# Patient Record
Sex: FEMALE | Race: WHITE | NOT HISPANIC OR LATINO | Employment: FULL TIME | URBAN - NONMETROPOLITAN AREA
[De-identification: names, ages, dates, MRNs, and addresses within clinical notes are randomized per-mention and may not be internally consistent; named-entity substitution may affect disease eponyms.]

---

## 2017-02-01 ENCOUNTER — COMMUNICATION - GICH (OUTPATIENT)
Dept: FAMILY MEDICINE | Facility: OTHER | Age: 27
End: 2017-02-01

## 2017-03-03 ENCOUNTER — HISTORY (OUTPATIENT)
Dept: FAMILY MEDICINE | Facility: OTHER | Age: 27
End: 2017-03-03

## 2017-03-03 ENCOUNTER — OFFICE VISIT - GICH (OUTPATIENT)
Dept: FAMILY MEDICINE | Facility: OTHER | Age: 27
End: 2017-03-03

## 2017-03-03 DIAGNOSIS — E04.1 NONTOXIC SINGLE THYROID NODULE: ICD-10-CM

## 2017-03-03 DIAGNOSIS — K21.9 GASTRO-ESOPHAGEAL REFLUX DISEASE WITHOUT ESOPHAGITIS: ICD-10-CM

## 2017-03-03 DIAGNOSIS — E66.9 OBESITY: ICD-10-CM

## 2017-03-03 LAB
T3,FREE - HISTORICAL: 3.42 PG/ML (ref 2.5–3.9)
T4 FREE SERPL-MCNC: 0.87 NG/DL (ref 0.58–1.64)
TSH - HISTORICAL: 1.41 UIU/ML (ref 0.34–5.6)

## 2017-03-03 ASSESSMENT — PATIENT HEALTH QUESTIONNAIRE - PHQ9: SUM OF ALL RESPONSES TO PHQ QUESTIONS 1-9: 2

## 2017-03-07 ENCOUNTER — COMMUNICATION - GICH (OUTPATIENT)
Dept: FAMILY MEDICINE | Facility: OTHER | Age: 27
End: 2017-03-07

## 2017-03-07 ENCOUNTER — HOSPITAL ENCOUNTER (OUTPATIENT)
Dept: RADIOLOGY | Facility: OTHER | Age: 27
End: 2017-03-07
Attending: FAMILY MEDICINE

## 2017-03-07 DIAGNOSIS — E04.1 NONTOXIC SINGLE THYROID NODULE: ICD-10-CM

## 2017-03-08 ENCOUNTER — AMBULATORY - GICH (OUTPATIENT)
Dept: FAMILY MEDICINE | Facility: OTHER | Age: 27
End: 2017-03-08

## 2017-03-08 DIAGNOSIS — E04.1 NONTOXIC SINGLE THYROID NODULE: ICD-10-CM

## 2017-03-09 ENCOUNTER — COMMUNICATION - GICH (OUTPATIENT)
Dept: FAMILY MEDICINE | Facility: OTHER | Age: 27
End: 2017-03-09

## 2017-03-13 ENCOUNTER — HOSPITAL ENCOUNTER (OUTPATIENT)
Dept: RADIOLOGY | Facility: OTHER | Age: 27
End: 2017-03-13
Attending: FAMILY MEDICINE

## 2017-03-13 DIAGNOSIS — E04.1 NONTOXIC SINGLE THYROID NODULE: ICD-10-CM

## 2017-03-14 ENCOUNTER — HOSPITAL ENCOUNTER (OUTPATIENT)
Dept: RADIOLOGY | Facility: OTHER | Age: 27
End: 2017-03-14
Attending: FAMILY MEDICINE

## 2017-03-14 ENCOUNTER — COMMUNICATION - GICH (OUTPATIENT)
Dept: FAMILY MEDICINE | Facility: OTHER | Age: 27
End: 2017-03-14

## 2017-03-14 ENCOUNTER — AMBULATORY - GICH (OUTPATIENT)
Dept: FAMILY MEDICINE | Facility: OTHER | Age: 27
End: 2017-03-14

## 2017-03-14 DIAGNOSIS — E04.1 NONTOXIC SINGLE THYROID NODULE: ICD-10-CM

## 2017-03-21 ENCOUNTER — AMBULATORY - GICH (OUTPATIENT)
Dept: SCHEDULING | Facility: OTHER | Age: 27
End: 2017-03-21

## 2017-03-21 ENCOUNTER — HOSPITAL ENCOUNTER (OUTPATIENT)
Dept: RADIOLOGY | Facility: OTHER | Age: 27
End: 2017-03-21
Attending: FAMILY MEDICINE

## 2017-03-21 DIAGNOSIS — E04.1 NONTOXIC SINGLE THYROID NODULE: ICD-10-CM

## 2017-03-24 ENCOUNTER — COMMUNICATION - GICH (OUTPATIENT)
Dept: FAMILY MEDICINE | Facility: OTHER | Age: 27
End: 2017-03-24

## 2017-03-28 ENCOUNTER — OFFICE VISIT - GICH (OUTPATIENT)
Dept: FAMILY MEDICINE | Facility: OTHER | Age: 27
End: 2017-03-28

## 2017-03-28 ENCOUNTER — HISTORY (OUTPATIENT)
Dept: FAMILY MEDICINE | Facility: OTHER | Age: 27
End: 2017-03-28

## 2017-03-28 DIAGNOSIS — R69 ILLNESS: ICD-10-CM

## 2017-05-26 ENCOUNTER — HEALTH MAINTENANCE LETTER (OUTPATIENT)
Age: 27
End: 2017-05-26

## 2017-06-09 ENCOUNTER — HISTORY (OUTPATIENT)
Dept: FAMILY MEDICINE | Facility: OTHER | Age: 27
End: 2017-06-09

## 2017-06-09 ENCOUNTER — OFFICE VISIT - GICH (OUTPATIENT)
Dept: FAMILY MEDICINE | Facility: OTHER | Age: 27
End: 2017-06-09

## 2017-06-09 DIAGNOSIS — H10.9 CONJUNCTIVITIS: ICD-10-CM

## 2017-08-05 ENCOUNTER — OFFICE VISIT - GICH (OUTPATIENT)
Dept: FAMILY MEDICINE | Facility: OTHER | Age: 27
End: 2017-08-05

## 2017-08-05 ENCOUNTER — HISTORY (OUTPATIENT)
Dept: FAMILY MEDICINE | Facility: OTHER | Age: 27
End: 2017-08-05

## 2017-08-05 DIAGNOSIS — B96.89 OTHER SPECIFIED BACTERIAL AGENTS AS THE CAUSE OF DISEASES CLASSIFIED ELSEWHERE: ICD-10-CM

## 2017-08-05 DIAGNOSIS — N76.0 ACUTE VAGINITIS: ICD-10-CM

## 2017-08-05 DIAGNOSIS — N89.8 OTHER SPECIFIED NONINFLAMMATORY DISORDERS OF VAGINA (CODE): ICD-10-CM

## 2017-09-27 ENCOUNTER — OFFICE VISIT - GICH (OUTPATIENT)
Dept: FAMILY MEDICINE | Facility: OTHER | Age: 27
End: 2017-09-27

## 2017-09-27 ENCOUNTER — HISTORY (OUTPATIENT)
Dept: FAMILY MEDICINE | Facility: OTHER | Age: 27
End: 2017-09-27

## 2017-09-27 DIAGNOSIS — S30.0XXA CONTUSION OF LOWER BACK AND PELVIS, INITIAL ENCOUNTER: ICD-10-CM

## 2017-09-27 DIAGNOSIS — S16.1XXA STRAIN OF MUSCLE, FASCIA AND TENDON AT NECK LEVEL, INITIAL ENCOUNTER: ICD-10-CM

## 2017-09-27 DIAGNOSIS — S39.012A STRAIN OF MUSCLE, FASCIA AND TENDON OF LOWER BACK, INITIAL ENCOUNTER: ICD-10-CM

## 2017-09-27 DIAGNOSIS — M79.10 MYALGIA: ICD-10-CM
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
## 2017-10-31 ENCOUNTER — COMMUNICATION - GICH (OUTPATIENT)
Dept: FAMILY MEDICINE | Facility: OTHER | Age: 27
End: 2017-10-31

## 2017-10-31 DIAGNOSIS — F33.1 MAJOR DEPRESSIVE DISORDER, RECURRENT, MODERATE (H): ICD-10-CM

## 2017-12-27 NOTE — PROGRESS NOTES
Patient Information     Patient Name MRN Sex Ita Gross [id number] Female 1990      Progress Notes by Vianney Acosta NP at 2017  2:15 PM     Author:  Vianney Acosta NP Service:  (none) Author Type:  PHYS- Nurse Practitioner     Filed:  2017  3:45 PM Encounter Date:  2017 Status:  Signed     :  Vianney Acosta NP (PHYS- Nurse Practitioner)            HPI:  Nursing Notes:   AlvarezChitra  2017  1:55 PM  Signed  Patient presents to clinic with redness in right eye.  Chitra VICTOR AntonioLPN ....................  2017   1:46 PM      Ita Poole is a 27 y.o. female who presents to clinic for right eye redness, itching, burning, green goo on lashes and keeps gluing shut-started yesterday. Blinking seems to worsen itching and burning. Denies coughing, sneezing, runny nose. Avoiding touching eye to treat symptoms.    Past Medical History:     Diagnosis  Date     Allergic rhinitis, cause unspecified      Celiac disease      Unspecified asthma      Past Surgical History:      Procedure  Laterality Date      SECTION  ,4/10/14     Section       COLONOSCOPY      age 10       ESOPHAGOGASTRODUODENOSCOPY      age 10, small bowel bx: celiac disease       LAP CHOLECYSTECTOMY  9/22/15    Cholecystectomy,Laparoscopic       Social History       Substance Use Topics         Smoking status:   Never Smoker     Smokeless tobacco:   Never Used      Comment: passive exposure      Alcohol use   No     Current Outpatient Prescriptions       Medication  Sig Dispense Refill     albuterol HFA (VENTOLIN HFA) 90 mcg/actuation inhaler Inhale 2 Puffs by mouth every 4 hours if needed for Wheezing. 1 Inhaler 0     CHOLECALCIFEROL, VITAMIN D3, (VITAMIN D3 ORAL) Take 1 Tab by mouth once daily.       omeprazole (PRILOSEC) 20 mg Delayed-Release capsule Take 1 capsule by mouth once daily before a meal.  11     Omeprazole 20 mg tablet Take 1 tablet by mouth once  "daily. 30 tablet 11     ranitidine (ZANTAC) 150 mg tablet Take 1 tablet by mouth 2 times daily. For breakthrough reflux. 60 tablet 11     risperiDONE (RISPERDAL) 0.5 mg tablet Take 1 tablet by mouth 2 times daily if needed.  0     sertraline (ZOLOFT) 100 mg tablet Take 100 mg by mouth once daily.  1     sertraline (ZOLOFT) 50 mg tablet Take 2 tablets at bedtime 90 tablet 3     traZODone (DESYREL) 50 mg tablet Take 1-2 tablets as needed at bedtime  0     No current facility-administered medications for this visit.      Medications have been reviewed by me and are current to the best of my knowledge and ability.    Allergies      Allergen   Reactions     Augmentin [Amoxicillin-Pot Clavulanate]  Hives     Gluten  Constipation     Celiac disease, abdominal pain      Zithromax [Azithromycin]       Can't take because it contains wheat        ROS:  Refer to HPI    /74  Pulse 60  Temp 98.4  F (36.9  C) (Tympanic)   Resp 18  Ht 1.588 m (5' 2.5\")  Wt 99.2 kg (218 lb 12.8 oz)  LMP 05/28/2017  BMI 39.38 kg/m2    EXAM:  General Appearance: Well appearing female, appropriate appearance for age. No acute distress  Eyes: right conjunctiva injected, no crusting on lashes or discharge, PERRLA  Psychological: normal affect, alert and pleasant    ASSESSMENT/PLAN:    ICD-10-CM    1. Bacterial conjunctivitis of right eye H10.9 trimethoprim-polymyxin b (POLYTRIM) ophthalmic solution   Right eye redness with itching, burning and crusting on lashes  On exam: well appearing female without fever, right conjunctiva injected, no crusting on lashes or discharge, PERRLA  Diagnosis: Allergic Conjunctivitis vs Bacterial Conjunctivitis  Please treat symptoms with OTC allergy eye drops, if symptoms worsen or don't improve with treatment may start Polytrim eye drops 1 drop QID 7 days  Follow up if symptoms persist or worsen    Patient Instructions      Index [place] Related topics   Viral or Bacterial Conjunctivitis (Pinkeye) "   ________________________________________________________________________  KEY POINTS    Viral or bacterial conjunctivitis is infection, redness, and swelling of the clear membrane that lines the inside of your eyelids and covers the white of your eye.    Viral conjunctivitis will usually go away on its own without treatment. However, your healthcare provider may prescribe eye drops to help control your symptoms.    For bacterial conjunctivitis, your healthcare provider will prescribe antibiotic eye drops.    To keep from getting conjunctivitis from someone who has it, or to keep from spreading it to others, wash your hands often, and avoid close contact with people until your symptoms improve.  ________________________________________________________________________  What is conjunctivitis?   Conjunctivitis is redness and swelling of the clear membrane that lines the inside of your eyelids and covers the white of your eye. This membrane is called the conjunctiva. This redness and swelling is also called pinkeye.   What is the cause?   This type of conjunctivitis is an infection caused by viruses or bacteria. These germs can be spread easily by coughing or sneezing, or by touching something with your hands and then touching your eyes. It can also be caused by improper cleaning of soft contact lenses.  What are the symptoms?   Symptoms may include:    Red, itchy, swollen or scratchy eyes    Sensitivity to light    Pus or watery discharge, which can cause crusty eyelashes  How is it diagnosed?   Your healthcare provider will ask about your symptoms, medical history, and activities, and examine your eyes. He or she will also check for enlarged lymph nodes near your ear and jaw, which may be a sign of an infection in your body. A sample of the pus from your eye may be sent to a lab to check for the cause.  How is it treated?   Viral conjunctivitis will usually go away on its own without treatment. However, your  healthcare provider may prescribe eye drops to help control your symptoms. Anti-allergy pills or eye drops may also relieve the itching and redness. Viral conjunctivitis usually gets worse at first, then gets better in 3 to 10 days. If only one eye is affected at first, it may spread to the other eye later. Usually, if both eyes are affected, the first eye has worse conjunctivitis than the second.  For bacterial conjunctivitis, your healthcare provider will prescribe antibiotic eye drops. With antibiotics, the symptoms should improve in a couple of days. It is important to avoid close contact with people until you have used the antibiotics for 24 hours and your eye does not have a lot of pus.   If you wear contact lenses, your provider may tell you to stop wearing them until your infection is gone. Wearing contact lenses while you have conjunctivitis may make the infection last longer. Wearing contacts while you have conjunctivitis may also damage your cornea, which is the clear outer layer on the front of your eye, and cause severe vision problems. Your provider may ask you to throw away your current contact lenses and lens case.  How can I take care of myself?   Follow the full course of treatment your healthcare provider prescribes. Ask your healthcare provider:    How and when you will get your test results    How long it will take to recover    If there are activities you should avoid and when you can return to your normal activities    How to take care of yourself at home    What symptoms or problems you should watch for and what to do if you have them  Make sure you know when you should come back for a checkup. Keep all appointments for provider visits or tests.  How can I help prevent conjunctivitis?   To keep from getting conjunctivitis from someone who has it, or to keep from spreading it to others, follow these guidelines:    Wash your hands often. Do not touch or rub your eyes.    Do not share eye makeup  or cosmetics with anyone. When you have conjunctivitis, throw out all eye makeup you have been using.    Never use eye medicine that has been prescribed for someone else.    Do not share towels, washcloths, pillows, or sheets with anyone. If one of your eyes is affected but not the other, use a separate towel for each eye.    Avoid swimming while you have conjunctivitis.    Avoid close contact with people until your symptoms improve. Depending on your job, you may be asked to take time off from work until the conjunctivitis is healed.  Reviewed for medical accuracy by faculty at the [facility]. Web site: [email or web address]  Developed by [employer].  Adult Advisor 2016.3 published by [employer].  Last modified: 2016-03-23  Last reviewed: 2015-09-21  This content is reviewed periodically and is subject to change as new health information becomes available. The information is intended to inform and educate and is not a replacement for medical evaluation, advice, diagnosis or treatment by a healthcare professional.  References   Adult Advisor 2016.3 Index    Copyright   2016 [employer], a division of [employer] All rights reserved.

## 2017-12-28 NOTE — PATIENT INSTRUCTIONS
Patient Information     Patient Name MRN Sex Ita Gross [id number] Female 1990      Patient Instructions by Negra Arvizu NP at 2017  1:00 PM     Author:  Negra Arvizu NP  Service:  (none) Author Type:  PHYS- Nurse Practitioner     Filed:  2017  1:49 PM  Encounter Date:  2017 Status:  Addendum     :  Negra Arvizu NP (PHYS- Nurse Practitioner)        Related Notes: Original Note by Negra Arvizu NP (PHYS- Nurse Practitioner) filed at 2017  1:47 PM            Flexeril every 8 hours as needed for muscle tightness, spasm    Ibuprofen every 8 hours as needed for pain and swelling - do not take today as your received a Toradol injection in clinic    Alternate ice and heat    Baths    Follow up if any worsening or concerns

## 2017-12-28 NOTE — PROGRESS NOTES
Patient Information     Patient Name MRN Sex Ita Gross [id number] Female 1990      Progress Notes by Negra Arvizu NP at 2017  1:00 PM     Author:  Negra Arvizu NP Service:  (none) Author Type:  PHYS- Nurse Practitioner     Filed:  2017  2:45 PM Encounter Date:  2017 Status:  Signed     :  Negra rAvizu NP (PHYS- Nurse Practitioner)            HPI:    Ita Poole is a 27 y.o. female who presents to clinic today for pain.   States she slipped down 3 steps outside yesterday on the deck stairs landing on her buttocks because her feet when out from under her.  States she has bruising and pain in the right buttocks.  She denies any tailbone pain or injury.  Generalized muscle tightness and soreness in hips, back, shoulders, and neck.  No headaches.  Dizziness/light headed earlier today, resolved now.   No numbness, tingling, or weakness in bilateral legs.  Sore to push for a bowel movement.  No dysuria.  Denies blood in urine or stool.  No abdominal pain.  Using topical pain cream.  Iced.  Ibuprofen 800 mg x 1 last night.          Past Medical History:     Diagnosis  Date     Allergic rhinitis, cause unspecified      Celiac disease      Unspecified asthma      Past Surgical History:      Procedure  Laterality Date      SECTION  ,4/10/14     Section       COLONOSCOPY      age 10       ESOPHAGOGASTRODUODENOSCOPY      age 10, small bowel bx: celiac disease       LAP CHOLECYSTECTOMY  9/22/15    Cholecystectomy,Laparoscopic       Social History       Substance Use Topics         Smoking status:   Never Smoker     Smokeless tobacco:   Never Used      Comment: passive exposure      Alcohol use   No     Current Outpatient Prescriptions       Medication  Sig Dispense Refill     albuterol HFA (VENTOLIN HFA) 90 mcg/actuation inhaler Inhale 2 Puffs by mouth every 4 hours if needed for Wheezing. 1 Inhaler 0     CHOLECALCIFEROL, VITAMIN D3, (VITAMIN D3 ORAL) Take 1  "Tab by mouth once daily.       omeprazole (PRILOSEC) 20 mg Delayed-Release capsule Take 1 capsule by mouth once daily before a meal.  11     Omeprazole 20 mg tablet Take 1 tablet by mouth once daily. 30 tablet 11     ranitidine (ZANTAC) 150 mg tablet Take 1 tablet by mouth 2 times daily. For breakthrough reflux. 60 tablet 11     risperiDONE (RISPERDAL) 0.5 mg tablet Take 1 tablet by mouth 2 times daily if needed.  0     sertraline (ZOLOFT) 100 mg tablet Take 100 mg by mouth once daily.  1     sertraline (ZOLOFT) 50 mg tablet Take 2 tablets at bedtime 90 tablet 3     No current facility-administered medications for this visit.      Medications have been reviewed by me and are current to the best of my knowledge and ability.    Allergies      Allergen   Reactions     Augmentin [Amoxicillin-Pot Clavulanate]  Hives     Gluten  Constipation     Celiac disease, abdominal pain      Zithromax [Azithromycin]       Can't take because it contains wheat        Past medical history, past surgical history, current medications and allergies reviewed and accurate to the best of my knowledge.        ROS:  Refer to HPI    /58  Pulse 64  Temp 97.9  F (36.6  C) (Tympanic)   Resp 20  Ht 1.6 m (5' 3\")  Wt 103.1 kg (227 lb 6.4 oz)  LMP 09/20/2017  Breastfeeding? No  BMI 40.28 kg/m2    EXAM:  General Appearance: Well appearing adult female, appropriate appearance for age. No acute distress  Head: normocephalic, atraumatic  Eyes: conjunctivae normal without erythema or irritation, no drainage or crusting, no eyelid swelling, pupils equal   Respiratory: normal chest wall and respirations.  Normal effort.  Clear to auscultation bilaterally, no wheezing, crackles or rhonchi.  No increased work of breathing.  No cough appreciated, oxygen saturation   Cardiac: RRR with no murmurs  :  No CVA tenderness to palpation.    Musculoskeletal:  No spinal tenderness to palpation over the cervical, thoracic, or lumbar spine.  Localized " tenderness to palpation over bruised area on right buttocks tissue.  Brianne ROM of neck without difficulty, guarding or stiffness.  Normal gait.  Equal movement of bilateral upper extremities.  Equal movement of bilateral lower extremities.    Dermatological:  Right buttocks with 13 cm x 7 cm deep bruise/contusion   Psychological: normal affect, alert and pleasant        ASSESSMENT/PLAN:    ICD-10-CM    1. Fall on steps, initial encounter W10.8XXA ibuprofen (ADVIL; MOTRIN) 800 mg tablet      ketorolac 60 mg injection (TORADOL)   2. Right buttock pain M79.1 ibuprofen (ADVIL; MOTRIN) 800 mg tablet      ketorolac 60 mg injection (TORADOL)   3. Contusion, buttock, initial encounter S30.0XXA ibuprofen (ADVIL; MOTRIN) 800 mg tablet      ketorolac 60 mg injection (TORADOL)   4. Low back strain, initial encounter S39.012A cyclobenzaprine (FLEXERIL) 10 mg tablet      ibuprofen (ADVIL; MOTRIN) 800 mg tablet      ketorolac 60 mg injection (TORADOL)   5. Strain of neck muscle, initial encounter S16.1XXA cyclobenzaprine (FLEXERIL) 10 mg tablet      ibuprofen (ADVIL; MOTRIN) 800 mg tablet      ketorolac 60 mg injection (TORADOL)         Contusion of buttocks tissue.  No spinal tenderness or pain - no xray indicated.  Generalized muscle strain and soreness from fall  Toradol 60 mg IM injection x 1 administered in clinic per patient request  Flexeril 10 mg TID PRN  Ibuprofen 800 mg TID PRN - instructed to wait 24 hours to take due to receiving Toradol injection  Tylenol TID PRN  Alternate ice and heat.  Try warm water baths.  Follow up with PCP if symptoms persist or worsen or concerns          Patient Instructions   Flexeril every 8 hours as needed for muscle tightness, spasm    Ibuprofen every 8 hours as needed for pain and swelling - do not take today as your received a Toradol injection in clinic    Alternate ice and heat    Baths    Follow up if any worsening or concerns

## 2017-12-28 NOTE — PROGRESS NOTES
Patient Information     Patient Name MRN Sex Ita Mcintyre [id number] Female 1990      Progress Notes by Lizzy Mckenzie NP at 2017 10:30 AM     Author:  Lizzy Mckenzie NP Service:  (none) Author Type:  PHYS- Nurse Practitioner     Filed:  2017 11:14 AM Encounter Date:  2017 Status:  Signed     :  Lizzy Mckenzie NP (PHYS- Nurse Practitioner)            Nursing Notes:   Salud Vera  2017 10:54 AM  Signed  Patient presents to the clinic with possible yeast infection. Started on . S/sx include itchy, burning, swollen, discharge.   Salud Vera ....................  2017   10:34 AM  SUBJECTIVE:    Ita Poole is a 27 y.o. female who presents for vaginal d/c, itching    Vaginal Itching   The patient's primary symptoms include genital itching and vaginal discharge. The patient's pertinent negatives include no genital lesions, genital odor, genital rash, missed menses, pelvic pain or vaginal bleeding. This is a new problem. The current episode started in the past 7 days. The problem occurs constantly. The problem has been unchanged. Pertinent negatives include no abdominal pain, chills, discolored urine, dysuria, fever, flank pain, frequency, nausea, rash or urgency. The vaginal discharge was white and copious. There has been no bleeding. She has not been passing clots. She has not been passing tissue. The symptoms are aggravated by tactile pressure. Treatments tried: Anti itch creams. No, her partner does not have an STD. Her past medical history is significant for vaginosis.       Current Outpatient Prescriptions on File Prior to Visit       Medication  Sig Dispense Refill     albuterol HFA (VENTOLIN HFA) 90 mcg/actuation inhaler Inhale 2 Puffs by mouth every 4 hours if needed for Wheezing. 1 Inhaler 0     CHOLECALCIFEROL, VITAMIN D3, (VITAMIN D3 ORAL) Take 1 Tab by mouth once daily.       omeprazole (PRILOSEC) 20 mg Delayed-Release capsule Take 1 capsule by  "mouth once daily before a meal.  11     Omeprazole 20 mg tablet Take 1 tablet by mouth once daily. 30 tablet 11     ranitidine (ZANTAC) 150 mg tablet Take 1 tablet by mouth 2 times daily. For breakthrough reflux. 60 tablet 11     risperiDONE (RISPERDAL) 0.5 mg tablet Take 1 tablet by mouth 2 times daily if needed.  0     sertraline (ZOLOFT) 100 mg tablet Take 100 mg by mouth once daily.  1     sertraline (ZOLOFT) 50 mg tablet Take 2 tablets at bedtime 90 tablet 3     traZODone (DESYREL) 50 mg tablet Take 1-2 tablets as needed at bedtime  0     No current facility-administered medications on file prior to visit.     and   Patient Active Problem List     Diagnosis  Code     Unspecified asthma(493.90) J45.909     Allergic rhinitis, cause unspecified J30.9     Celiac disease K90.0     Elevated fasting glucose R73.01     Vitamin D deficiency E55.9     Major depressive disorder, recurrent episode, moderate (HC) F33.1     H/O  section Z98.891     Irregular menses N92.6     Obesity E66.9     Cold thyroid nodule, right lobe E04.1       REVIEW OF SYSTEMS:  Review of Systems   Constitutional: Negative for chills and fever.   Gastrointestinal: Negative for abdominal pain and nausea.   Genitourinary: Positive for vaginal discharge. Negative for dysuria, flank pain, frequency, missed menses, pelvic pain and urgency.   Skin: Negative for rash.       OBJECTIVE:  /70  Pulse 68  Temp 97.7  F (36.5  C) (Tympanic)  Resp 18  Ht 1.6 m (5' 3\")  Wt 100.9 kg (222 lb 6.4 oz)  LMP 2017  Breastfeeding? No  BMI 39.4 kg/m2    EXAM:   Physical Exam   Constitutional: She is well-developed, well-nourished, and in no distress.   HENT:   Head: Normocephalic and atraumatic.   Eyes: Conjunctivae are normal.   Cardiovascular: Normal rate.    Pulmonary/Chest: Effort normal. No respiratory distress.   Genitourinary:   Genitourinary Comments: Did not want an exam since she had her kids present, collected the wet prep her self.  "   Skin: Skin is warm and dry.   Psychiatric: Mood and affect normal.   Nursing note and vitals reviewed.    Results for orders placed or performed in visit on 08/05/17      WET PREP GENITAL      Result  Value Ref Range    TRICHOMONAS               None Seen None Seen    YEAST                     None Seen None Seen    CLUE CELLS                Present (A) None Seen       ASSESSMENT/PLAN:    ICD-10-CM    1. Vaginal discharge N89.8 WET PREP GENITAL      WET PREP GENITAL   2. Bacterial vaginosis N76.0 metroNIDAZOLE (FLAGYL) 500 mg tablet     B96.89         Plan:  Completed labs at today's visit Wet prep.  I personally reviewed the labs with the patient/parent at the visit. Abnormalities include + clue cells. Treated with flagyl for BV. Home cares discussed. F/U if needed.       CHENG MOON NP ....................  8/5/2017   11:13 AM

## 2017-12-28 NOTE — PATIENT INSTRUCTIONS
Patient Information     Patient Name MRN Ita Read [id number] Female 1990      Patient Instructions by Lizzy Mckenzie NP at 2017 10:30 AM     Author:  Lizzy Mckenzie NP Service:  (none) Author Type:  PHYS- Nurse Practitioner     Filed:  2017 11:12 AM Encounter Date:  2017 Status:  Signed     :  Lizzy Mckenzie NP (PHYS- Nurse Practitioner)            Vaginal Bacteria Overgrowth (Bacterial Vaginosis)   ________________________________________________________________________  KEY POINTS    Bacterial vaginosis (BV) is an overgrowth of a certain type of bacteria in the vagina (birth canal). It is a common condition that may or may not cause symptoms.    Untreated BV may go away on its own. If BV is causing itching, pain, or other problems, you may need antibiotic medicine.    Ask your healthcare provider if there are activities you should avoid and when you can return to your normal activities.  ________________________________________________________________________  What is bacterial vaginosis?  Bacterial vaginosis (BV) is an overgrowth of a certain type of bacteria in the vagina (birth canal). It is a common condition that may or may not cause symptoms.   What is the cause?  It s normal to have some bacteria in the vagina, but sometimes there are too many of certain types of bacteria. Doctors don t know what causes this imbalance of bacteria. One possible cause is douching (cleaning out the vagina with water or other fluids).  Most cases of bacterial vaginosis happen in sexually active women. Women who have more than 1 sexual partner have a greater risk of this problem. However, women who are not sexually active can also have vaginosis. Smoking cigarettes also increases the risk.  What are the symptoms?  Many women don t have any symptoms. When women do have symptoms, the most common symptom is a discharge from the vagina. The discharge may be gray or yellowish and  smell bad. For example, it may smell fishy, especially after sex. You may also have itching around the opening of the vagina. Sometimes BV can cause pain or burning in the vagina that does not go away.  How is it diagnosed?  Your healthcare provider will ask about your symptoms and medical history. You will have a pelvic exam, and your provider will get a sample of vaginal discharge for lab tests.  How is it treated?   Untreated bacterial vaginosis sometimes goes away on its own. Sometimes, if you scratch the area to relieve itching, you may get an infection. Rarely, it may cause vaginal pain that keeps bothering you. If bacterial vaginosis is causing itching, pain, or other problems, it may need to be treated with antibiotics. The medicine for bacterial vaginosis may be taken by mouth or it may be a cream or gel that you put into the vagina. The symptoms usually go away within a few days after you start treatment.   How can I take care of myself?  Follow your healthcare provider's instructions. Ask your provider:    How and when you will get your test results    How long it will take to recover    If there are activities you should avoid and when you can return to your normal activities    How to take care of yourself at home    What symptoms or problems you should watch for and what to do if you have them  Make sure you know when you should come back for a checkup. Keep all appointments for provider visits or tests.  How can I help prevent bacterial vaginosis?    Use latex or polyurethane condoms during foreplay and every time you have vaginal, oral, or anal sex.    Have just 1 sexual partner who is not having sex with anyone else.    If you have had sex and are worried that you may have been infected, see your healthcare provider even if you don t have any symptoms.    Do not douche.

## 2017-12-28 NOTE — TELEPHONE ENCOUNTER
Patient Information     Patient Name MRN Sex Ita Gross [id number] Female 1990      Telephone Encounter by Augusto Barreto RN at 10/31/2017  3:20 PM     Author:  Augusto Barreto RN Service:  (none) Author Type:  NURS- Registered Nurse     Filed:  10/31/2017  3:29 PM Encounter Date:  10/31/2017 Status:  Signed     :  Augusto Barreto RN (NURS- Registered Nurse)            Writer received a faxed rx request from [facility] in [facility]. Per faxed rx request:    Patient will be out today, and MALAI Case denied rx refill.    Chart review shows that rx as requested is noted to be historical in patient's chart. Writer is unable to fill rx as requested. Will route rx request to PCP for his consideration/approval.     This is a Refill request from: [facility] in [facility]  Name of Medication: Zoloft  Quantity requested: 90 tabs  Last fill date: 17 for a 30 day supply as per rx request  Due for refill: Yes, as per rx request  Last visit with VAHE REYNOSO was on: 2017 in [facility]  PCP:  Vahe Reynoso MD  Controlled Substance Agreement: N/A   Diagnosis r/t this medication request: Depression     Unable to complete prescription refill per RN Medication Refill Policy.................... Augusto Barreto RN ....................  10/31/2017   3:25 PM

## 2017-12-29 NOTE — PATIENT INSTRUCTIONS
Patient Information     Patient Name MRN Ita Read [id number] Female 1990      Patient Instructions by Vianney Acosta NP at 2017  2:15 PM     Author:  Vianney Acosta NP Service:  (none) Author Type:  PHYS- Nurse Practitioner     Filed:  2017  2:01 PM Encounter Date:  2017 Status:  Signed     :  Vianney Acosta NP (PHYS- Nurse Practitioner)               Index Frisian Related topics   Viral or Bacterial Conjunctivitis (Pinkeye)   ________________________________________________________________________  KEY POINTS    Viral or bacterial conjunctivitis is infection, redness, and swelling of the clear membrane that lines the inside of your eyelids and covers the white of your eye.    Viral conjunctivitis will usually go away on its own without treatment. However, your healthcare provider may prescribe eye drops to help control your symptoms.    For bacterial conjunctivitis, your healthcare provider will prescribe antibiotic eye drops.    To keep from getting conjunctivitis from someone who has it, or to keep from spreading it to others, wash your hands often, and avoid close contact with people until your symptoms improve.  ________________________________________________________________________  What is conjunctivitis?   Conjunctivitis is redness and swelling of the clear membrane that lines the inside of your eyelids and covers the white of your eye. This membrane is called the conjunctiva. This redness and swelling is also called pinkeye.   What is the cause?   This type of conjunctivitis is an infection caused by viruses or bacteria. These germs can be spread easily by coughing or sneezing, or by touching something with your hands and then touching your eyes. It can also be caused by improper cleaning of soft contact lenses.  What are the symptoms?   Symptoms may include:    Red, itchy, swollen or scratchy eyes    Sensitivity to light    Pus or  watery discharge, which can cause crusty eyelashes  How is it diagnosed?   Your healthcare provider will ask about your symptoms, medical history, and activities, and examine your eyes. He or she will also check for enlarged lymph nodes near your ear and jaw, which may be a sign of an infection in your body. A sample of the pus from your eye may be sent to a lab to check for the cause.  How is it treated?   Viral conjunctivitis will usually go away on its own without treatment. However, your healthcare provider may prescribe eye drops to help control your symptoms. Anti-allergy pills or eye drops may also relieve the itching and redness. Viral conjunctivitis usually gets worse at first, then gets better in 3 to 10 days. If only one eye is affected at first, it may spread to the other eye later. Usually, if both eyes are affected, the first eye has worse conjunctivitis than the second.  For bacterial conjunctivitis, your healthcare provider will prescribe antibiotic eye drops. With antibiotics, the symptoms should improve in a couple of days. It is important to avoid close contact with people until you have used the antibiotics for 24 hours and your eye does not have a lot of pus.   If you wear contact lenses, your provider may tell you to stop wearing them until your infection is gone. Wearing contact lenses while you have conjunctivitis may make the infection last longer. Wearing contacts while you have conjunctivitis may also damage your cornea, which is the clear outer layer on the front of your eye, and cause severe vision problems. Your provider may ask you to throw away your current contact lenses and lens case.  How can I take care of myself?   Follow the full course of treatment your healthcare provider prescribes. Ask your healthcare provider:    How and when you will get your test results    How long it will take to recover    If there are activities you should avoid and when you can return to your normal  activities    How to take care of yourself at home    What symptoms or problems you should watch for and what to do if you have them  Make sure you know when you should come back for a checkup. Keep all appointments for provider visits or tests.  How can I help prevent conjunctivitis?   To keep from getting conjunctivitis from someone who has it, or to keep from spreading it to others, follow these guidelines:    Wash your hands often. Do not touch or rub your eyes.    Do not share eye makeup or cosmetics with anyone. When you have conjunctivitis, throw out all eye makeup you have been using.    Never use eye medicine that has been prescribed for someone else.    Do not share towels, washcloths, pillows, or sheets with anyone. If one of your eyes is affected but not the other, use a separate towel for each eye.    Avoid swimming while you have conjunctivitis.    Avoid close contact with people until your symptoms improve. Depending on your job, you may be asked to take time off from work until the conjunctivitis is healed.  Reviewed for medical accuracy by faculty at the [facility]. Web site: http://[email or web address]/  Developed by [employer].  Adult Advisor 2016.3 published by [employer].  Last modified: 2016-03-23  Last reviewed: 2015-09-21  This content is reviewed periodically and is subject to change as new health information becomes available. The information is intended to inform and educate and is not a replacement for medical evaluation, advice, diagnosis or treatment by a healthcare professional.  References   Adult Advisor 2016.3 Index    Copyright   2016 [employer], a division of [employer] All rights reserved.

## 2017-12-30 NOTE — NURSING NOTE
Patient Information     Patient Name MRN Ita Read [id number] Female 1990      Nursing Note by Salud Vera at 2017 10:30 AM     Author:  Salud Vera Service:  (none) Author Type:  NURS- Student Practical Nurse     Filed:  2017 10:54 AM Encounter Date:  2017 Status:  Signed     :  Salud Vera (NURS- Student Practical Nurse)            Patient presents to the clinic with possible yeast infection. Started on . S/sx include itchy, burning, swollen, discharge.   Salud Vera ....................  2017   10:34 AM

## 2017-12-30 NOTE — NURSING NOTE
Patient Information     Patient Name MRN Sex Ita Gross [id number] Female 1990      Nursing Note by Chitra Alvarez at 2017  2:15 PM     Author:  Chitra Alvarez Service:  (none) Author Type:  (none)     Filed:  2017  1:55 PM Encounter Date:  2017 Status:  Signed     :  Chitra Alvarez            Patient presents to clinic with redness in right eye.  Chitra England ....................  2017   1:46 PM

## 2018-01-03 NOTE — TELEPHONE ENCOUNTER
Patient Information     Patient Name MRN Ita Read [id number] Female 1990      Telephone Encounter by Will Reynoso MD at 2017  4:48 PM     Author:  Will Reynoso MD Service:  (none) Author Type:  Physician     Filed:  2017  4:49 PM Encounter Date:  2017 Status:  Signed     :  Will Reynoso MD (Physician)            Yes, she can just stop it. It is not common for birth control to cause depression.  Stopping it will make her periods heavier and less predictable.  I would advise against stopping it until her boyfriend has the post vas specimen checked.  Will Reynoso MD ....................  2017   4:49 PM

## 2018-01-03 NOTE — PROGRESS NOTES
Patient Information     Patient Name MRN Sex Ita Gross [id number] Female 1990      Progress Notes by [facility] at 3/7/2017 10:31 AM     Author:  Radha [facility]T. [facility] Service:  (none) Author Type:  RadTech - Registered Radiologic Technologist     Filed:  3/7/2017 10:31 AM Date of Service:  3/7/2017 10:31 AM Status:  Signed     :  Radha Paredes R.T. (ARRT) ([facility] - Registered Radiologic Technologist)            Falls Risk Criteria:    Age 65 and older or under age 4        Sensory deficits    Poor vision    Use of ambulatory aides    Impaired judgment    Unable to walk independently    Meets High Risk criteria for falls:  no

## 2018-01-03 NOTE — TELEPHONE ENCOUNTER
Patient Information     Patient Name MRN Sex Ita Gross [id number] Female 1990      Telephone Encounter by Lisa Jenkins at 3/14/2017  1:14 PM     Author:  Lisa Jenkins Service:  (none) Author Type:  (none)     Filed:  3/14/2017  1:27 PM Encounter Date:  3/14/2017 Status:  Signed     :  Lisa Jenkins            Patient notified   Lisa Jenkins LPN ..........3/14/2017 1:27 PM

## 2018-01-03 NOTE — MISCELLANEOUS
Patient Information     Patient Name MRN Sex Ita Gross [id number] Female 1990      Protocol by Viki Galicia RN at 3/21/2017 11:18 AM     Author:  Viki Galicia RN Service:  (none) Author Type:  NURS- Registered Nurse     Filed:  3/21/2017 11:19 AM Date of Service:  3/21/2017 11:18 AM Status:  Signed     :  Viki Galicia RN (NURS- Registered Nurse)            Universal Protocol    A. Pre-procedure verification complete yes  1-relevant information / documentation available, reviewed and properly matched to the patient; 2-consent accurate and complete, 3-equipment and supplies available    B. Site marking complete Yes  Site marked if not in continuous attendance with patient    C. TIME OUT completed yes  Time Out was conducted just prior to starting procedure to verify the eight required elements: 1-patient identity, 2-consent accurate and complete, 3-position, 4-correct side/site marked (if applicable), 5-procedure, 6-relevant images / results properly labeled and displayed (if applicable), 7-antibiotics / irrigation fluids (if applicable), 8-safety precautions.

## 2018-01-03 NOTE — PROGRESS NOTES
Patient Information     Patient Name MRN Sex Ita Gross [id number] Female 1990      Progress Notes by Dena Cole CNMT at 3/13/2017  9:35 AM     Author:  Dena Cole CNMT Service:  (none) Author Type:  RadTech - Registered Radiologic Technologist     Filed:  3/13/2017  9:35 AM Date of Service:  3/13/2017  9:35 AM Status:  Signed     :  Dena Cole CNMT ([employer] - Registered Radiologic Technologist)            Falls Risk Criteria:    Age 65 and older or under age 4        Sensory deficits    Poor vision    Use of ambulatory aides    Impaired judgment    Unable to walk independently    Meets High Risk criteria for falls:  no

## 2018-01-03 NOTE — TELEPHONE ENCOUNTER
Patient Information     Patient Name MRN Ita Reda [id number] Female 1990      Telephone Encounter by Sarahi Larry at 3/9/2017  8:36 AM     Author:  Sarahi Larry Service:  (none) Author Type:  (none)     Filed:  3/9/2017  8:40 AM Encounter Date:  3/9/2017 Status:  Signed     :  Sarahi Larry            Patient notified that results were released to her my chart. Read result notes. Patient states she is aware and nuclear med scan is already set up.  Sarahi Larry LPN   3/9/2017  8:40 AM

## 2018-01-03 NOTE — PROGRESS NOTES
Patient Information     Patient Name MRN Sex Ita Gross [id number] Female 1990      Progress Notes by Dena Cole CNMT at 3/13/2017  9:35 AM     Author:  Dena Cole CNMT Service:  (none) Author Type:  RadTech - Registered Radiologic Technologist     Filed:  3/13/2017  9:36 AM Date of Service:  3/13/2017  9:35 AM Status:  Signed     :  Dena Cole CNMT ([employer] - Registered Radiologic Technologist)                       1.  Do you have dizziness or vertigo?    yes                    2.  Do you need help standing or walking?   no                 3.  Have you fallen within the last 6 months?    yes           4.  Has the patient been fasting?      no       If any risks are marked Yes, the following interventions are utilized:    Do not leave patient unattended     Assist patient in the dressing room and bathroom    Have ambulatory aides available throughout procedure    Involve patient s family if available

## 2018-01-03 NOTE — PROGRESS NOTES
"Patient Information     Patient Name MRN Sex Ita Gross [id number] Female 1990      Progress Notes by Will Reynoso MD at 3/3/2017 10:30 AM     Author:  Will Reynoso MD Service:  (none) Author Type:  Physician     Filed:  3/3/2017  4:34 PM Encounter Date:  3/3/2017 Status:  Signed     :  Will Reynoso MD (Physician)            There are no exam notes on file for this visit.    SUBJECTIVE:  Ita Poole  is a 26 y.o. female comes in today for \"stomach issues\". She's had trouble with reflux in the past. She had been on omeprazole 40 mg daily with ranitidine for breakthrough reflux. She ran out of medications in the fall. She did ok on Zantac, but then ran out and symptoms came back.     She noticed a month ago a lump in her right neck.  It is a bit sore.  No systemic symptoms.    Past Medical, Family, and Social History reviewed and updated as noted below.   ROS is negative except as noted above       Allergies      Allergen   Reactions     Augmentin [Amoxicillin-Pot Clavulanate]  Hives     Gluten  Constipation     Celiac disease, abdominal pain      Zithromax [Azithromycin]       Can't take because it contains wheat    ,   Family History       Problem   Relation Age of Onset     Asthma  Mother      Allergies  Mother      Asthma  Father      Heart Disease  Father 43      of MI       Diabetes  Father      Asthma  Maternal Grandmother      Diabetes  Paternal Grandfather    ,   Current Outpatient Prescriptions on File Prior to Visit       Medication  Sig Dispense Refill     albuterol HFA (VENTOLIN HFA) 90 mcg/actuation inhaler Inhale 2 Puffs by mouth every 4 hours if needed for Wheezing. 1 Inhaler 0     omeprazole (PRILOSEC) 40 mg Delayed-Release capsule Take 1 tab 15 minutes before meal each day 30 capsule 0     ranitidine (ZANTAC) 150 mg tablet Take 1 tablet by mouth 2 times daily. Prn for breakthrough reflux 60 tablet 5     risperiDONE (RISPERDAL) 0.5 mg tablet Take 1 tablet by mouth 2 " times daily if needed.  0     sertraline (ZOLOFT) 50 mg tablet Take 2 tablets at bedtime 90 tablet 3     traZODone (DESYREL) 50 mg tablet Take 1-2 tablets as needed at bedtime  0     No current facility-administered medications on file prior to visit.    ,   Past Medical History     Diagnosis  Date     Allergic rhinitis, cause unspecified      Celiac disease      Unspecified asthma(493.90)    ,   Patient Active Problem List       Diagnosis  Date Noted     Obesity  2017     Irregular menses  2016     H/O  section  2014     Major depressive disorder, recurrent episode, moderate (HC)  2013     Dx added per office visit on 12 with Dr. Burch.  Fátima Baltazar RN, Clinical , [facility], 2013 5:02 PM             Vitamin D deficiency  10/06/2010     Elevated fasting glucose  10/02/2010     Unspecified asthma(493.90)  2008     Allergic rhinitis, cause unspecified  2008     Celiac disease  2008   ,   Past Surgical History       Procedure   Laterality Date      section   ,4/10/14      Section       Colonoscopy        age 10       Esophagogastroduodenoscopy        age 10, small bowel bx: celiac disease       Lap cholecystectomy   9/22/15     Cholecystectomy,Laparoscopic      and   Social History       Substance Use Topics         Smoking status:   Never Smoker     Smokeless tobacco:   Never Used      Comment: passive exposure      Alcohol use   No     OBJECTIVE:  Visit Vitals       /84     Pulse 76     Temp 99  F (37.2  C)     Wt 98 kg (216 lb)     Breastfeeding No     BMI 38.26 kg/m2      EXAM:  Alert and cooperative, obese, no distress. Mucous membranes are moist and she doesn't appear anemic. Neck is supple but she has a palpable thyroid nodule on the right that is slightly tender. Thyroid is mildly enlarged. Neck is otherwise supple without significant adenopathy. Lungs are clear, no rales  rhonchi or wheezes are heard. Cardiac RRR without murmur. Abdomen is obese, soft, mild tenderness to deep palpation in epigastrium. No rebound guarding or peritoneal signs.    Results for orders placed or performed in visit on 03/03/17      TSH      Result  Value Ref Range    TSH 1.41 0.34 - 5.60 uIU/mL   T4,FREE      Result  Value Ref Range    T4,FREE 0.87 0.58 - 1.64 ng/dL   T3,FREE      Result  Value Ref Range    T3,FREE 3.42 2.50 - 3.90 pg/mL      ASSESSMENT/Plan :      Ita was seen today for follow up.    Diagnoses and all orders for this visit:    Gastroesophageal reflux disease without esophagitis  -     Omeprazole 20 mg tablet; Take 1 tablet by mouth once daily.  -     ranitidine (ZANTAC) 150 mg tablet; Take 1 tablet by mouth 2 times daily. For breakthrough reflux.    Right thyroid nodule  -     TSH; Future  -     T4,FREE; Future  -     T3,FREE; Future  -     US THYROID/PARATHYROID; Future  -     TSH  -     T4,FREE  -     T3,FREE    Obesity, unspecified obesity severity, unspecified obesity type       with regard to reflux, recommended that she stay on omeprazole 20 mg daily. Okay to use ranitidine for breakthrough reflux. Discussed antireflux measures and the importance of weight loss.  Ms. Poole's Body mass index is 38.26 kg/(m^2). This is out of the normal range for a 26 y.o. Normal range for ages 18-64 is between 18.5 and 24.9; normal range for ages 65+ is 23-30. To lose weight we reviewed risks and benefits of appropriate options such as diet, exercise, and medications. Patient's strategy will be  self-directed nutrition plan and self-directed exercise program     She has a nodule on the right thyroid. She is biochemically euthyroid. We'll proceed with thyroid ultrasound, likely followed by thyroid uptake and scan with possible FNA or biopsy. This was discussed in some detail. Discussed the pathophysiology of the condition in detail and treatment options.      A total of 25 minutes was spent with the  patient, greater than 50% of the time was spent in counseling/discussion of the aforementioned concerns.     Will Reynoso MD

## 2018-01-03 NOTE — TELEPHONE ENCOUNTER
Patient Information     Patient Name MRN Sex Ita Gross [id number] Female 1990      Telephone Encounter by Marilu Jacobs at 3/7/2017  2:50 PM     Author:  Marilu Jacobs Service:  (none) Author Type:  (none)     Filed:  3/7/2017  2:51 PM Encounter Date:  3/7/2017 Status:  Signed     :  Marilu Jacobs            She was given the results of her labs per Will Reynoso MD.  Marilu Jacobs LPN..................3/7/2017   2:51 PM

## 2018-01-04 NOTE — TELEPHONE ENCOUNTER
Patient Information     Patient Name MRN Ita Read [id number] Female 1990      Telephone Encounter by Farzana Herrmann at 3/24/2017  3:10 PM     Author:  Farzana Herrmann Service:  (none) Author Type:  (none)     Filed:  3/24/2017  3:10 PM Encounter Date:  3/24/2017 Status:  Signed     :  Farzana Herrmann            Patient notified  Farzana Herrmann LPN........3/24/2017  3:10 PM

## 2018-01-04 NOTE — PATIENT INSTRUCTIONS
Patient Information     Patient Name MRN Ita Read [id number] Female 1990      Patient Instructions by Lizzy Mckenzie NP at 3/28/2017  3:15 PM     Author:  Lizzy Mckenzie NP Service:  (none) Author Type:  PHYS- Nurse Practitioner     Filed:  3/28/2017  3:42 PM Encounter Date:  3/28/2017 Status:  Signed     :  Lizzy Mckenzie NP (PHYS- Nurse Practitioner)            Flu (Influenza)   ________________________________________________________________________  KEY POINTS    Flu is caused by a virus, and can be spread by coughing or sneezing, or by touching something with the virus on it.    The flu vaccine is the best way to help prevent the flu. Washing your hands often is also important. Flu can be treated rest, drinking plenty of liquids, and sometimes with medicine.    If you have another medical problem and get the flu, it s best to see your healthcare provider.  ________________________________________________________________________  What is flu?   Influenza, also called the flu, is an infection caused by a virus. The flu affects your whole body, especially your air passages, and causes symptoms that are similar to cold symptoms. Flu symptoms tend to be worse than cold symptoms, but it can sometimes be hard to tell the difference between the flu and a cold unless you get a test.  Infection with the flu virus sometimes leads to other infections, such as ear, sinus, and chest infections. Pneumonia can also occur as a result of the flu. It can be caused by the flu virus itself or by bacteria infecting lung tissues that have been damaged by the virus. Older adults; people whose immune systems are weak; and people with chronic medical problems, such as heart or lung disease or diabetes, are at risk for more severe symptoms or problems. This is why it s important to try to prevent flu by getting flu shots every year.  What is the cause?  Flu is caused by a virus. When you have the  flu, the virus is in your mucus and saliva and can spread to others when you cough or sneeze. People can also get the flu if they touch something with the flu virus on it (like cups, doorknobs, and hands) and then touch their mouth, nose, or eyes.  Outbreaks of flu occur every year, usually in late fall and winter.  What are the symptoms?  Flu tends to start suddenly. You may feel fine one hour and feel sick the next. Flu symptoms may be different from person to person. Some of the common symptoms include:    Chills, sweating, and fever    Cough    Runny or stuffy nose    Headache    Muscle or body aches    Sore throat    Tiredness  Flu symptoms usually last 3 to 7 days. You may start feeling better after the first 2 days or so.  How is it diagnosed?  Your healthcare provider will ask about your symptoms and may examine you. The diagnosis is usually based on your symptoms. There are lab tests for flu, but in most cases there is no need to do a test, especially when many others in your community are sick with the flu.  How is it treated?  Usually you can treat your symptoms at home.    Get plenty of rest.    Drink a lot of clear liquids. Water, broth, juice, electrolyte solutions, and noncaffeinated drinks are best. When you have a high fever, your body needs more liquid because you lose more water in your breath and from your skin. Having enough fluids also helps the mucus in your sinuses and lungs stay thin and easy to clear from the body. When the mucus is thin, it is less likely to cause a sinus or chest infection.    Consider taking acetaminophen or ibuprofen to relieve headaches and muscle aches and to lower a fever. Read the label and take as directed. Unless recommended by your healthcare provider, you should not take these medicines for more than 10 days.    Nonsteroidal anti-inflammatory medicines (NSAIDs), such as ibuprofen, naproxen, and aspirin, may cause stomach bleeding and other problems. These risks  increase with age.    Acetaminophen may cause liver damage or other problems. Unless recommended by your provider, don't take more than 3000 milligrams (mg) in 24 hours. To make sure you don t take too much, check other medicines you take to see if they also contain acetaminophen. Ask your provider if you need to avoid drinking alcohol while taking this medicine.    If your nose or sinuses get congested, a decongestant medicine may help you feel better. Taking a decongestant may help prevent ear or sinus infections.    Cough medicine or cough drops may temporarily help control a cough.  Antiviral medicine is medicine your healthcare provider can prescribe that may make flu symptoms less severe. It may also help you feel better a little sooner. The medicine can be taken as a tablet or nasal spray. It helps only if you start taking it within the first 2 days of illness. Usually it is taken for only a few days. Even if you are taking antiviral medicine, you can pass the flu virus to other people. It is still important to wash your hands often and cover your mouth and nose when you cough or sneeze.  Your healthcare provider may prescribe antiviral medicine if you aren t sick yet but have been exposed to the flu and have not had the flu vaccine.  Talk to your healthcare provider if you have symptoms of the flu and:    You have heart disease, asthma, chronic bronchitis, kidney disease, diabetes, or another chronic medical problem.    Your immune system does not work normally (for example, because you are taking steroid medicine for a medical problem).    Your symptoms get more severe, you have a painful cough, you are coughing up mucus, or you are having trouble breathing. These symptoms can be signs of pneumonia.  Ask your healthcare provider:    How and when you will get your test results    How long it will take to recover from this illness    If there are activities you should avoid, and when you can return to your  normal activities    How to take care of yourself at home    What symptoms or problems you should watch for and what to do if you have them  Make sure you know when you should come back for a checkup. Keep all appointments for provider visits or tests.  How can I help prevent flu?  The flu vaccine is the best way to help prevent the flu. If you do get the flu, the vaccine may help keep you from getting really sick. The flu vaccine is recommended for adults and children 6 months and older. It s especially important for those with a chronic illness.  The flu vaccine can be given as a nasal spray or as a shot in the arm. The nasal spray is not recommended for the 8454-6167 flu season because it has not prevented the disease for the last 3 years.    The shot contains killed virus and is safe for everyone age 6 months and older.  You should get a new flu shot every year because the vaccine wears off over time and because it is changed each year to protect against the current year s most likely flu strains. It s best to get the new vaccine as soon as it s available each year, before the start of flu season. However, if the vaccine is still available, it can be helpful to get it anytime during the flu season. Flu season usually starts in October and can last through May.  Flu seasons can vary from region to region. If you are at high risk for infection and plan to travel to an area where you might be exposed to the flu, make sure you have an up-to-date flu shot before you go on your trip.  Other things you can do to help avoid getting the flu are:    Wash your hands often with soap and water. Wash for 20 seconds (long enough to sing the whole  Happy Birthday  song) or use an alcohol-based hand .    Avoid touching your eyes, nose, or mouth when you are out in public.    Stay at least 6 feet away from people who are sick, if you can.    Try to take good care of yourself: Get plenty of sleep, be physically active,  manage your stress, drink plenty of fluids, and eat healthy food. Stop smoking.    Keep surfaces clean--especially bedside tables, surfaces in the bathroom, and toys for children. Some viruses and bacteria can live 2 hours or more on surfaces like cafeteria tables, doorknobs, and desks. Wipe them down with a household disinfectant according to directions on the label.  If you are sick, you can help protect others if you:    Don t go to work or school. Avoid contact with other people except to get medical care.    Cover your nose and mouth with a tissue when you cough or sneeze. Throw the tissue in the trash after you use it, and then wash your hands. If you don t have a tissue, cough or sneeze into your upper sleeve instead of your hands.    Clean your hands often with soap and water or an alcohol-based hand , especially after using tissues or coughing or sneezing into your hands.

## 2018-01-04 NOTE — NURSING NOTE
Patient Information     Patient Name MRN Ita Read [id number] Female 1990      Nursing Note by Gosselin, Norma J at 3/28/2017  3:15 PM     Author:  Gosselin, Norma J Service:  (none) Author Type:  (none)     Filed:  3/28/2017  3:41 PM Encounter Date:  3/28/2017 Status:  Signed     :  Gosselin, Norma J            Body aches, fever, chills,headache,nausea X 2 days.  Norma J Gosselin LPN....................  3/28/2017   3:21 PM

## 2018-01-04 NOTE — PROGRESS NOTES
Patient Information     Patient Name MRN Sex Ita Gross [id number] Female 1990      Progress Notes by Valeri Lam RN at 3/21/2017 11:45 AM     Author:  Valeri Lam RN Service:  (none) Author Type:  NURS- Registered Nurse     Filed:  3/21/2017 11:46 AM Date of Service:  3/21/2017 11:45 AM Status:  Signed     :  Valeri Lam RN (NURS- Registered Nurse)            Total of 5 samples taken.  Cytology personnel in room: Yes  Insertion complications: None  Patient tolerated the procedure:  Yes  Bandaide applied:  Yes       Discharge Note    Data:  Ita Poole has been discharged home at 1145 via ambulatory accompanied by Family.      Action:  Written discharge/follow-up instructions were provided to patient. Prescriptions : None.  Belongings sent with patient. Medications from home sent with patient/family: Not Applicable  Equipment ice pack given.     Response:  Patient verbalized understanding of discharge instructions, reason for discharge, and necessary follow-up appointments.

## 2018-01-04 NOTE — PROGRESS NOTES
Patient Information     Patient Name MRN Sex Ita Gross [id number] Female 1990      Progress Notes by Lizzy Mckenzie NP at 3/28/2017  3:15 PM     Author:  Lizzy Mckenzie NP Service:  (none) Author Type:  PHYS- Nurse Practitioner     Filed:  3/28/2017  5:30 PM Encounter Date:  3/28/2017 Status:  Signed     :  Lizzy Mckenzie NP (PHYS- Nurse Practitioner)            Nursing Notes:   Gosselin, Norma J  3/28/2017  3:41 PM  Signed  Body aches, fever, chills,headache,nausea X 2 days.  Norma J Gosselin LPN....................  3/28/2017   3:21 PM      SUBJECTIVE:    Ita Poole is a 27 y.o. female who presents for Cough and fever    URI    This is a new problem. The current episode started yesterday. The problem has been rapidly worsening. The maximum temperature recorded prior to her arrival was 101 - 101.9 F. The fever has been present for 1 to 2 days. Associated symptoms include congestion, coughing, headaches, a plugged ear sensation, rhinorrhea, sneezing and a sore throat. Pertinent negatives include no chest pain, ear pain, sinus pain, swollen glands, vomiting or wheezing. Associated symptoms comments: Decreased eating, fatigue, headaches, body aches. . She has tried nothing for the symptoms. The treatment provided no relief.       Current Outpatient Prescriptions on File Prior to Visit       Medication  Sig Dispense Refill     albuterol HFA (VENTOLIN HFA) 90 mcg/actuation inhaler Inhale 2 Puffs by mouth every 4 hours if needed for Wheezing. 1 Inhaler 0     CHOLECALCIFEROL, VITAMIN D3, (VITAMIN D3 ORAL) Take 1 Tab by mouth once daily.       Omeprazole 20 mg tablet Take 1 tablet by mouth once daily. 30 tablet 11     ranitidine (ZANTAC) 150 mg tablet Take 1 tablet by mouth 2 times daily. For breakthrough reflux. 60 tablet 11     risperiDONE (RISPERDAL) 0.5 mg tablet Take 1 tablet by mouth 2 times daily if needed.  0     sertraline (ZOLOFT) 50 mg tablet Take 2 tablets at bedtime 90 tablet  "3     traZODone (DESYREL) 50 mg tablet Take 1-2 tablets as needed at bedtime  0     No current facility-administered medications on file prior to visit.        REVIEW OF SYSTEMS:  Review of Systems   HENT: Positive for congestion, rhinorrhea, sneezing and sore throat. Negative for ear pain.    Respiratory: Positive for cough. Negative for wheezing.    Cardiovascular: Negative for chest pain.   Gastrointestinal: Negative for vomiting.   Neurological: Positive for headaches.       OBJECTIVE:  /70  Pulse (!) 126  Temp 99.4  F (37.4  C)  Ht 1.568 m (5' 1.75\")  Wt 98 kg (216 lb)  LMP 03/12/2017  SpO2 95%  BMI 39.83 kg/m2    EXAM:   Physical Exam   Constitutional:  Non-toxic appearance. She has a sickly appearance. No distress.   HENT:   Head: Normocephalic and atraumatic.   Right Ear: Tympanic membrane and ear canal normal.   Left Ear: Tympanic membrane and ear canal normal.   Nose: Rhinorrhea present.   Mouth/Throat: Uvula is midline, oropharynx is clear and moist and mucous membranes are normal.   Eyes: Conjunctivae are normal.   Neck: Neck supple.   Cardiovascular: Normal rate, regular rhythm and normal heart sounds.    Pulmonary/Chest: Effort normal and breath sounds normal. No respiratory distress. She has no wheezes. She has no rales.   Lymphadenopathy:     She has no cervical adenopathy.   Nursing note and vitals reviewed.      ASSESSMENT/PLAN:    ICD-10-CM    1. Influenza-like illness R69 oseltamivir (TAMIFLU) 75 mg capsule        Plan:  She did not get a flu shot. S/S likely flu. Tamiflu recommended. Rest, fluids, and over-the-counter symptomatic treatments were recommended. The lack of efficacy of antibiotics in viral illnesses was discussed. The patient will return to the clinic or call if the symptoms worsen, new problems develop, or if all symptoms are not significantly improved in 72 hours, and completely resolved within one week. Otherwise the patient will call with other questions, concerns, or " problems.      CHENG MOON NP ....................  3/28/2017   5:30 PM

## 2018-01-04 NOTE — TELEPHONE ENCOUNTER
Patient Information     Patient Name MRN Sex Ita Gross [id number] Female 1990      Telephone Encounter by Cristal Horta MD at 3/24/2017  3:08 PM     Author:  Cristal Horta MD Service:  (none) Author Type:  Physician     Filed:  3/24/2017  3:08 PM Encounter Date:  3/24/2017 Status:  Signed     :  Cristal Horta MD (Physician)            Results were benign.  Would defer any recommendations for follow up to Dr. Reynoso.  Cristal Horta MD ....................  3/24/2017   3:08 PM

## 2018-01-04 NOTE — TELEPHONE ENCOUNTER
Patient Information     Patient Name MRN Ita Read [id number] Female 1990      Telephone Encounter by Farzana Herrmann at 3/24/2017  3:03 PM     Author:  Farzana Herrmann Service:  (none) Author Type:  (none)     Filed:  3/24/2017  3:05 PM Encounter Date:  3/24/2017 Status:  Signed     :  Farzana Herrmann            Patient calling asking for thyroid needle biopsy results.  Farzana Herrmann LPN........................3/24/2017  3:05 PM

## 2018-01-04 NOTE — ADDENDUM NOTE
Patient Information     Patient Name MRN Sex Ita Gross [id number] Female 1990      Addendum Note by Vahe Reynoso MD at 3/27/2017  1:03 PM     Author:  Vahe Reynoso MD Service:  (none) Author Type:  Physician     Filed:  3/27/2017  1:03 PM Encounter Date:  3/21/2017 Status:  Signed     :  Vahe Reynoso MD (Physician)       Addended by: VAHE REYNOSO on: 3/27/2017 01:03 PM        Modules accepted: Orders

## 2018-01-05 ENCOUNTER — COMMUNICATION - GICH (OUTPATIENT)
Dept: FAMILY MEDICINE | Facility: OTHER | Age: 28
End: 2018-01-05

## 2018-01-05 DIAGNOSIS — F32.89 OTHER SPECIFIED DEPRESSIVE EPISODES: ICD-10-CM

## 2018-01-22 PROBLEM — E66.9 OBESITY: Status: ACTIVE | Noted: 2017-03-03

## 2018-01-22 PROBLEM — E04.1 COLD THYROID NODULE: Status: ACTIVE | Noted: 2017-03-14

## 2018-01-22 RX ORDER — ALBUTEROL SULFATE 90 UG/1
2 AEROSOL, METERED RESPIRATORY (INHALATION) EVERY 4 HOURS PRN
COMMUNITY
Start: 2014-12-26 | End: 2022-10-16

## 2018-01-22 RX ORDER — IBUPROFEN 800 MG/1
800 TABLET, FILM COATED ORAL 3 TIMES DAILY PRN
COMMUNITY
Start: 2017-09-27 | End: 2019-02-12

## 2018-01-22 RX ORDER — SERTRALINE HYDROCHLORIDE 100 MG/1
100 TABLET, FILM COATED ORAL DAILY
COMMUNITY
Start: 2017-10-31 | End: 2018-02-19

## 2018-01-22 RX ORDER — CYCLOBENZAPRINE HCL 10 MG
10 TABLET ORAL 3 TIMES DAILY PRN
COMMUNITY
Start: 2017-09-27 | End: 2019-02-12

## 2018-01-22 RX ORDER — RISPERIDONE 0.5 MG/1
TABLET ORAL
COMMUNITY
Start: 2018-01-05 | End: 2018-03-26

## 2018-01-22 RX ORDER — NICOTINE POLACRILEX 4 MG/1
20 GUM, CHEWING ORAL DAILY
COMMUNITY
Start: 2017-03-03 | End: 2018-02-19

## 2018-01-25 VITALS
HEART RATE: 126 BPM | WEIGHT: 216 LBS | DIASTOLIC BLOOD PRESSURE: 84 MMHG | BODY MASS INDEX: 38.26 KG/M2 | TEMPERATURE: 99.4 F | DIASTOLIC BLOOD PRESSURE: 70 MMHG | SYSTOLIC BLOOD PRESSURE: 124 MMHG | SYSTOLIC BLOOD PRESSURE: 106 MMHG | WEIGHT: 216 LBS | OXYGEN SATURATION: 95 % | HEART RATE: 76 BPM | HEIGHT: 62 IN | TEMPERATURE: 99 F | BODY MASS INDEX: 39.75 KG/M2

## 2018-01-25 VITALS
RESPIRATION RATE: 18 BRPM | HEART RATE: 60 BPM | DIASTOLIC BLOOD PRESSURE: 74 MMHG | WEIGHT: 218.8 LBS | TEMPERATURE: 98.4 F | SYSTOLIC BLOOD PRESSURE: 122 MMHG | HEIGHT: 63 IN | BODY MASS INDEX: 38.77 KG/M2

## 2018-01-25 VITALS
WEIGHT: 222.4 LBS | HEART RATE: 64 BPM | TEMPERATURE: 97.7 F | RESPIRATION RATE: 18 BRPM | WEIGHT: 227.4 LBS | DIASTOLIC BLOOD PRESSURE: 70 MMHG | HEART RATE: 68 BPM | HEIGHT: 63 IN | DIASTOLIC BLOOD PRESSURE: 58 MMHG | BODY MASS INDEX: 39.41 KG/M2 | RESPIRATION RATE: 20 BRPM | SYSTOLIC BLOOD PRESSURE: 104 MMHG | BODY MASS INDEX: 40.29 KG/M2 | HEIGHT: 63 IN | SYSTOLIC BLOOD PRESSURE: 116 MMHG | TEMPERATURE: 97.9 F

## 2018-01-27 ASSESSMENT — PATIENT HEALTH QUESTIONNAIRE - PHQ9: SUM OF ALL RESPONSES TO PHQ QUESTIONS 1-9: 2

## 2018-02-01 ENCOUNTER — COMMUNICATION - GICH (OUTPATIENT)
Dept: FAMILY MEDICINE | Facility: OTHER | Age: 28
End: 2018-02-01

## 2018-02-01 DIAGNOSIS — F33.1 MAJOR DEPRESSIVE DISORDER, RECURRENT, MODERATE (H): ICD-10-CM

## 2018-02-06 ENCOUNTER — OFFICE VISIT - GICH (OUTPATIENT)
Dept: FAMILY MEDICINE | Facility: OTHER | Age: 28
End: 2018-02-06

## 2018-02-06 DIAGNOSIS — M62.838 OTHER MUSCLE SPASM: ICD-10-CM
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
## 2018-02-06 DIAGNOSIS — M79.10 MYALGIA: ICD-10-CM

## 2018-02-06 DIAGNOSIS — E04.1 NONTOXIC SINGLE THYROID NODULE: ICD-10-CM

## 2018-02-06 DIAGNOSIS — Z23 ENCOUNTER FOR IMMUNIZATION: ICD-10-CM

## 2018-02-06 DIAGNOSIS — M67.441 GANGLION OF RIGHT HAND: ICD-10-CM

## 2018-02-06 DIAGNOSIS — S39.012A STRAIN OF MUSCLE, FASCIA AND TENDON OF LOWER BACK, INITIAL ENCOUNTER: ICD-10-CM

## 2018-02-06 DIAGNOSIS — S30.0XXA CONTUSION OF LOWER BACK AND PELVIS, INITIAL ENCOUNTER: ICD-10-CM

## 2018-02-06 DIAGNOSIS — S16.1XXA STRAIN OF MUSCLE, FASCIA AND TENDON AT NECK LEVEL, INITIAL ENCOUNTER: ICD-10-CM

## 2018-02-06 ASSESSMENT — PATIENT HEALTH QUESTIONNAIRE - PHQ9: SUM OF ALL RESPONSES TO PHQ QUESTIONS 1-9: 0

## 2018-02-09 VITALS
DIASTOLIC BLOOD PRESSURE: 80 MMHG | HEART RATE: 76 BPM | SYSTOLIC BLOOD PRESSURE: 132 MMHG | WEIGHT: 224.4 LBS | BODY MASS INDEX: 39.75 KG/M2

## 2018-02-10 ASSESSMENT — PATIENT HEALTH QUESTIONNAIRE - PHQ9: SUM OF ALL RESPONSES TO PHQ QUESTIONS 1-9: 0

## 2018-02-12 DIAGNOSIS — M79.644 PAIN OF FINGER OF RIGHT HAND: Primary | ICD-10-CM

## 2018-02-12 NOTE — TELEPHONE ENCOUNTER
Patient Information     Patient Name MRN Sex Ita Gross [id number] Female 1990      Telephone Encounter by Augusto Barreto RN at 2018  8:58 AM     Author:  Augusto Barreto RN Service:  (none) Author Type:  NURS- Registered Nurse     Filed:  2018  9:06 AM Encounter Date:  2018 Status:  Signed     :  Augusto Barreto RN (NURS- Registered Nurse)            Writer received faxed rx request from [facility] in [facility] in relation to patient's risperidone. Per pharmacy request, patient is requesting a refill from her PCP. Current rx for risperidone is listed as historical in patient's chart, and doesn't match rx as requested from pharmacy. Call placed to patient to confirm. Number listed for patient has been disconnected. Patient's mother is unavailable. However, rx as requested from pharmacy was filled recently by pharmacy on 10/12/17. Writer will blayne up rx as requested by pharmacy and will route rx request to PCP for his consideration/approval at this time.    This is a Refill request from: [facility] in [facility]  Name of Medication: Risperidone  Quantity requested: 60 tabs with 2 refills  Last fill date: 10/12/17 as per rx request  Due for refill: As per rx request, yes  Last visit with VAHE REYNOSO was on: 2017 in [facility]  PCP:  Vahe Reynoso MD  Controlled Substance Agreement: N/A   Diagnosis r/t this medication request: Unknown     Unable to complete prescription refill per RN Medication Refill Policy.................... Augusto Barreto RN ....................  2018   9:02 AM

## 2018-02-12 NOTE — TELEPHONE ENCOUNTER
Patient Information     Patient Name MRN Ita Read [id number] Female 1990      Telephone Encounter by Will Reynoso MD at 2018  2:53 PM     Author:  Will Reynoso MD Service:  (none) Author Type:  Physician     Filed:  2018  2:54 PM Encounter Date:  2018 Status:  Signed     :  Will Reynoso MD (Physician)            I think this was prescribed by her psychiatrist. I'll fill it, but should be managed by her mental health provider.   Will Reynoso MD ....................  2018   2:53 PM

## 2018-02-12 NOTE — TELEPHONE ENCOUNTER
Patient Information     Patient Name MRN Sex Ita Gross [id number] Female 1990      Telephone Encounter by Johana De La Fuente at 2018  4:21 PM     Author:  Johana De La Fuente Service:  (none) Author Type:  (none)     Filed:  2018  4:22 PM Encounter Date:  2018 Status:  Signed     :  Johana De La Fuente notified of Dr Reynoso's response.

## 2018-02-13 NOTE — PROGRESS NOTES
Patient Information     Patient Name MRN Sex Ita Gross [id number] Female 1990      Progress Notes by Will Reynoso MD at 2018 11:15 AM     Author:  Will Reynoso MD Service:  (none) Author Type:  Physician     Filed:  2018  6:52 PM Encounter Date:  2018 Status:  Signed     :  Will Reynoso MD (Physician)            Nursing Notes:   Marilu Jacobs  2018 11:36 AM  Signed  She has a lump on the knuckle of her right index finger. It has been there for 2 months. It is painful and today it was hard to bend.  Marilu Jacobs LPN..................2018   11:29 AM      SUBJECTIVE:  Ita Poole  is a 27 y.o. female who comes in today for evaluation of a lump on the knuckle of her right index finger. It's been there for about 2 months. It's gotten painful and now it's hard to bend that finger.     She had evaluation for a cold thyroid nodule last spring with a negative fine-needle aspiration.        Past Medical, Family, and Social History reviewed and updated as noted below.   ROS is negative except as noted above       Allergies      Allergen   Reactions     Augmentin [Amoxicillin-Pot Clavulanate]  Hives     Gluten  Constipation     Celiac disease, abdominal pain      Zithromax [Azithromycin]       Can't take because it contains wheat    ,   Family History       Problem   Relation Age of Onset     Asthma  Mother      Allergies  Mother      Asthma  Father      Heart Disease  Father 43      of MI       Diabetes  Father      Asthma  Maternal Grandmother      Diabetes  Paternal Grandfather    ,   Current Outpatient Prescriptions on File Prior to Visit       Medication  Sig Dispense Refill     albuterol HFA (VENTOLIN HFA) 90 mcg/actuation inhaler Inhale 2 Puffs by mouth every 4 hours if needed for Wheezing. 1 Inhaler 0     CHOLECALCIFEROL, VITAMIN D3, (VITAMIN D3 ORAL) Take 1 Tab by mouth once daily.       omeprazole (PRILOSEC) 20 mg Delayed-Release capsule Take 1 capsule by  mouth once daily before a meal.  11     Omeprazole 20 mg tablet Take 1 tablet by mouth once daily. 30 tablet 11     ranitidine (ZANTAC) 150 mg tablet Take 1 tablet by mouth 2 times daily. For breakthrough reflux. 60 tablet 11     risperiDONE (RISPERDAL) 0.5 mg tablet Take 1 tablet (0.5 mg) by mouth twice a day at bedtime and as needed 60 tablet 2     sertraline (ZOLOFT) 100 mg tablet Take 1.5 tablets by mouth once daily. 135 tablet prn     No current facility-administered medications on file prior to visit.    ,   Past Medical History:     Diagnosis  Date     Allergic rhinitis, cause unspecified      Celiac disease      Unspecified asthma    ,   Patient Active Problem List       Diagnosis  Date Noted     Cold thyroid nodule, right lobe  2017     Obesity  2017     H/O  section  2014     Major depressive disorder, recurrent episode, moderate (HC)  2013     Dx added per office visit on 12 with Dr. Burch.  Fátima Baltazar RN, Clinical , [facility], 2013 5:02 PM             Vitamin D deficiency  10/06/2010     Unspecified asthma(493.90)  2008     Allergic rhinitis, cause unspecified  2008     Celiac disease  2008   ,   Past Surgical History:      Procedure  Laterality Date      SECTION  ,4/10/14     Section       COLONOSCOPY      age 10       ESOPHAGOGASTRODUODENOSCOPY      age 10, small bowel bx: celiac disease       LAP CHOLECYSTECTOMY  9/22/15    Cholecystectomy,Laparoscopic      and   Social History       Substance Use Topics         Smoking status:   Never Smoker     Smokeless tobacco:   Never Used      Comment: passive exposure      Alcohol use   No     OBJECTIVE:  /80 (Cuff Site: Right Arm, Position: Sitting, Cuff Size: Adult Large)  Pulse 76  Wt 101.8 kg (224 lb 6.4 oz)  Breastfeeding? No  BMI 39.75 kg/m2   EXAM:  Alert and cooperative, no distress. No palpable thyroid  nodules are noted. Lungs are clear, no rales rhonchi or wheezes are heard. Cardiac RRR without murmur. Examination of her right index finger, on the PIP joint on the extensor service is a small tender non-reddened and slightly mobile ganglion cyst.  ASSESSMENT/Plan :      Ita was seen today for lump.    Diagnoses and all orders for this visit:    Ganglion cyst of finger of right hand  -     AMB CONSULT TO ORTHOPEDICS - AFFILIATE ONLY; Future    Fall on steps, initial encounter    Right buttock pain  -     ibuprofen (ADVIL; MOTRIN) 800 mg tablet; Take 1 tablet by mouth 3 times daily if needed for Pain.    Contusion, buttock, initial encounter  -     ibuprofen (ADVIL; MOTRIN) 800 mg tablet; Take 1 tablet by mouth 3 times daily if needed for Pain.    Low back strain, initial encounter    Strain of neck muscle, initial encounter    Needs flu shot  -     FLU VACCINE => 3 YRS PF QUADRIVALENT IIV4 IM  -     MA ADMIN VACC INITIAL SEASONAL AFFILIATE ONLY    Muscle spasm  -     cyclobenzaprine (FLEXERIL) 10 mg tablet; Take 1 tablet by mouth 3 times daily if needed for Muscle Spasm.    Cold thyroid nodule, right lobe     because her ganglion cyst is symptomatic, she is referred to orthopedics for an opinion. If she decides to have this excised, would be low risk for same. Her graft flu vaccine is given today. Renewal on her Flexeril and ibuprofen which she takes intermittently.    Continue to employ expectant management after fine-needle aspiration of thyroid nodule that was benign.      Will Reynoso MD

## 2018-02-13 NOTE — NURSING NOTE
Patient Information     Patient Name MRN Ita Read [id number] Female 1990      Nursing Note by Marilu Jacobs at 2018 11:15 AM     Author:  Marilu Jacobs Service:  (none) Author Type:  (none)     Filed:  2018 11:36 AM Encounter Date:  2018 Status:  Signed     :  Marilu Jacobs            She has a lump on the knuckle of her right index finger. It has been there for 2 months. It is painful and today it was hard to bend.  Marilu Jacobs LPN..................2018   11:29 AM

## 2018-02-13 NOTE — TELEPHONE ENCOUNTER
Patient Information     Patient Name MRN Ita Read [id number] Female 1990      Telephone Encounter by Augusto Barreto RN at 2018  9:28 AM     Author:  Augusto Barreto RN Service:  (none) Author Type:  NURS- Registered Nurse     Filed:  2018  9:35 AM Encounter Date:  2018 Status:  Signed     :  Augusto Barreto RN (NURS- Registered Nurse)            Writer received a faxed rx request from [facility] in [place]. Per faxed rx request:    Patient states she takes sertraline 150 mg daily. She would like a new rx from Dr. Reynoso. Patient states she has an appointment with PCP on 18.     Writer is unable to find that patient has an appointment with PCP today, and most recent rx in patient's chart is as follows:    Prescribing Provider: Vahe Reynoso MD                   Order Date: 10/31/2017  Ordered by: VAHE REYNOSO  Medication:sertraline (ZOLOFT) 100 mg tablet    Qty:90 tablet   Ref:prn  Start:10/31/2017  End:              Route:Oral                  CLOVIS:No   Class:eRx    Sig:Take 1 tablet by mouth once daily.    Pharmacy:[id number] ([place]) - [address]              [address]    Patient hasn't seen PCP since 3/3/17, but it looks as though Michelle Case also sees patient as last refill of zoloft was given to patient as Michelle Case denied refill request (see 10/31/17 refill encounter). Call placed to patient to discuss. Number listed for patient is no longer in service.     Writer will blayne up rx as requested by patient/pharmacy and will route rx request to PCP for his consideration/approval at this time.    Unable to complete prescription refill per RN Medication Refill Policy.................... Augusto Barreto RN ....................  2018   9:34 AM

## 2018-02-19 ENCOUNTER — OFFICE VISIT (OUTPATIENT)
Dept: ORTHOPEDICS | Facility: OTHER | Age: 28
End: 2018-02-19
Attending: ORTHOPAEDIC SURGERY
Payer: COMMERCIAL

## 2018-02-19 ENCOUNTER — HOSPITAL ENCOUNTER (OUTPATIENT)
Dept: GENERAL RADIOLOGY | Facility: OTHER | Age: 28
Discharge: HOME | End: 2018-02-19
Attending: ORTHOPAEDIC SURGERY | Admitting: ORTHOPAEDIC SURGERY
Payer: COMMERCIAL

## 2018-02-19 VITALS
DIASTOLIC BLOOD PRESSURE: 80 MMHG | WEIGHT: 230 LBS | SYSTOLIC BLOOD PRESSURE: 120 MMHG | HEIGHT: 63 IN | BODY MASS INDEX: 40.75 KG/M2 | HEART RATE: 76 BPM

## 2018-02-19 DIAGNOSIS — M79.644 PAIN OF FINGER OF RIGHT HAND: ICD-10-CM

## 2018-02-19 DIAGNOSIS — R22.31 MASS OF FINGER OF RIGHT HAND: Primary | ICD-10-CM

## 2018-02-19 PROCEDURE — 73140 X-RAY EXAM OF FINGER(S): CPT | Mod: RT

## 2018-02-19 PROCEDURE — G0463 HOSPITAL OUTPT CLINIC VISIT: HCPCS

## 2018-02-19 PROCEDURE — 99204 OFFICE O/P NEW MOD 45 MIN: CPT | Mod: 57 | Performed by: ORTHOPAEDIC SURGERY

## 2018-02-19 RX ORDER — SERTRALINE HYDROCHLORIDE 100 MG/1
150 TABLET, FILM COATED ORAL DAILY
COMMUNITY
Start: 2018-02-01 | End: 2019-02-18

## 2018-02-19 ASSESSMENT — PAIN SCALES - GENERAL: PAINLEVEL: MILD PAIN (2)

## 2018-02-19 NOTE — MR AVS SNAPSHOT
"              After Visit Summary   2018    Ita Poole    MRN: [id number]           Patient Information     Date Of Birth          1990        Visit Information        Provider Department      2018 9:45 AM Nate Allen, DO [facility]        Today's Diagnoses     Mass of right index finger at the PIP joint    -  1       Follow-ups after your visit        Follow-up notes from your care team     Return in about 2 weeks (around 3/5/2018).      Who to contact     If you have questions or need follow up information about today's clinic visit or your schedule please contact [facility] directly at [phone].  Normal or non-critical lab and imaging results will be communicated to you by Q2ebankinghart, letter or phone within 4 business days after the clinic has received the results. If you do not hear from us within 7 days, please contact the clinic through Q2ebankinghart or phone. If you have a critical or abnormal lab result, we will notify you by phone as soon as possible.  Submit refill requests through [employer] or call your pharmacy and they will forward the refill request to us. Please allow 3 business days for your refill to be completed.          Additional Information About Your Visit        MyChart Information     [employer] lets you send messages to your doctor, view your test results, renew your prescriptions, schedule appointments and more. To sign up, go to [email or web address] . Click on \"Log in\" on the left side of the screen, which will take you to the Welcome page. Then click on \"Sign up Now\" on the right side of the page.     You will be asked to enter the access code listed below, as well as some personal information. Please follow the directions to create your username and password.     Your access code is: [email or web address]  Expires: 2018 10:39 AM     Your access code will  in 90 days. If you need help or a new code, please call your [facility] " "clinic or [phone].        Care EveryWhere ID     This is your Care EveryWhere ID. This could be used by other organizations to access your [facility] medical records  [id number]        Your Vitals Were     Pulse Height Breastfeeding? BMI (Body Mass Index)          76 1.6 m (5' 3\") No 40.74 kg/m2         Blood Pressure from Last 3 Encounters:   02/19/18 120/80   02/06/18 132/80   09/27/17 104/58    Weight from Last 3 Encounters:   02/19/18 104.3 kg (230 lb)   02/06/18 101.8 kg (224 lb 6.4 oz)   09/27/17 103.1 kg (227 lb 6.4 oz)              Today, you had the following     No orders found for display       Primary Care Provider Office Phone # Fax #    Will ALBA Reynoso [phone] [address] [facility] [address]        Equal Access to Services     GENE WILLSON AH: Hadii aad ku hadasho Soomaali, waaxda luqadaha, qaybta kaalmada adeegyada, johnna sultana . So [facility] [place] [facility] [phone].    ATENCIÓN: Si rowdy monge, tiene a alanis disposición servicios gratuitos de asistencia lingüística. Llame al [phone].    We comply with applicable federal civil rights laws and [place] laws. We do not discriminate on the basis of race, color, national origin, age, disability, sex, sexual orientation, or gender identity.            Thank you!     Thank you for choosing [facility] AND [facility]  for your care. Our goal is always to provide you with excellent care. Hearing back from our patients is one way we can continue to improve our services. Please take a few minutes to complete the written survey that you may receive in the mail after your visit with us. Thank you!             Your Updated Medication List - Protect others around you: Learn how to safely use, store and throw away your medicines at www.disposemymeds.org.          This list is accurate as of 2/19/18 10:53 AM.  Always use your most recent med list.                   Brand Name Dispense Instructions " for use Diagnosis    albuterol 108 (90 BASE) MCG/ACT Inhaler    PROAIR HFA/PROVENTIL HFA/VENTOLIN HFA     Inhale 2 puffs into the lungs every 4 hours as needed for wheezing        cyclobenzaprine 10 MG tablet    FLEXERIL     Take 10 mg by mouth 3 times daily as needed for muscle spasms        EPI E-Z PEN LYNETTE 1:1000  IJ     1    1 TIME ONLY    Swelling, mass, or lump in head and neck       ibuprofen 800 MG tablet    ADVIL/MOTRIN     Take 800 mg by mouth 3 times daily as needed for pain        risperiDONE 0.5 MG tablet    risperDAL     Take 1 tablet (0.5 mg) by mouth twice a day at bedtime and as needed        sertraline 100 MG tablet    ZOLOFT     Take 150 mg by mouth daily

## 2018-02-19 NOTE — PROGRESS NOTES
Ita Poole was seen in consultation for Will Reynoso MD for a chief complaint of mass over the PIP joint right index finger.    CHIEF COMPLAINT: Ita Poole is a 27 year old  female  Chief Complaint   Patient presents with     Consult     Right index finger       HISTORY OF PRESENTING INJURY   History of presenting injury, a mass is formed over the PIP joint dorsal aspect right index finger.  This been there for greater than 2 months and she does not recall any trauma to the area.  Pain is worse with bending and any pressure placed onto it.  She is try to avoid it but the pain has continued.  Description of pain: mild.  Radiation of pain: No.  Pain course: gradually worsening.  Worse with: Banging against objects and increased activity.  Improved by: Rest.  History of injection: no.  Any PT: no.      PAST MEDICAL HISTORY:  Past Medical History:   Diagnosis Date     Allergic rhinitis     No Comments Provided     Celiac disease     No Comments Provided     Mass of right index finger at the PIP joint 2018     Uncomplicated asthma     No Comments Provided       PAST SURGICAL HISTORY:  Past Surgical History:   Procedure Laterality Date      SECTION      ,4/10/14, Section     COLONOSCOPY      age 10     LAPAROSCOPIC CHOLECYSTECTOMY      9/22/15,Cholecystectomy,Laparoscopic     OTHER SURGICAL HISTORY      [id number],ESOPHAGOGASTRODUODENOSCOPY,age 10, small bowel bx: celiac disease       ALLERGIES:  Allergies   Allergen Reactions     Amoxicillin-Pot Clavulanate Hives     Augmentin      Yeast Infection in colon     Azithromycin Nausea     Azithromycin      Can't take because it contains wheat     Codeine      constipation     Dairy [Amylase]      Not the lactose but the protein. Becomes extra phlem in throat.     Gluten      Gluten Meal      Other reaction(s): Constipation  Celiac disease, abdominal pain       CURRENT MEDICATIONS:  Current Outpatient Prescriptions   Medication Sig Dispense Refill  "    sertraline (ZOLOFT) 100 MG tablet Take 150 mg by mouth daily       cyclobenzaprine (FLEXERIL) 10 MG tablet Take 10 mg by mouth 3 times daily as needed for muscle spasms       risperiDONE (RISPERDAL) 0.5 MG tablet Take 1 tablet (0.5 mg) by mouth twice a day at bedtime and as needed       albuterol (PROAIR HFA/PROVENTIL HFA/VENTOLIN HFA) 108 (90 BASE) MCG/ACT Inhaler Inhale 2 puffs into the lungs every 4 hours as needed for wheezing       ibuprofen (ADVIL/MOTRIN) 800 MG tablet Take 800 mg by mouth 3 times daily as needed for pain       EPI E-Z PEN LYNETTE 1:1000  IJ 1 TIME ONLY 1 0       SOCIAL HISTORY:  Marital Status: single (never ).  Children: yes.  Occupation: Presently not working.  Alcohol use:Occassional.  Tobacco use: Smoker: no.  Are you or have you used illicit drugs:  yes, marijuana use in the past.    FAMILY HISTORY:  Family History   Problem Relation Age of Onset     Asthma Mother      Asthma     Allergy (Severe) Mother      Allergies     Asthma Father      Asthma     HEART DISEASE Father 43     Heart Disease, of MI     DIABETES Father      Diabetes     Asthma Maternal Grandmother      Asthma     DIABETES Paternal Grandfather      Diabetes       REVIEW OF SYSTEMS:  The review of systems as documented in the HPI and on the intake questionnaire, completedby the patient on 2018 have been reviewed by myself and the pertinentpositives and negatives addressed.  The remainder of the complete review of systems was non-contributory.    PHYSICAL EXAM:   /80 (BP Location: Right arm, Patient Position: Sitting, Cuff Size: Adult Large)  Pulse 76  Ht 1.6 m (5' 3\")  Wt 104.3 kg (230 lb)  Breastfeeding? No  BMI 40.74 kg/m2 Body mass index is 40.74 kg/(m^2).    General Appearance: Pleasant 27 year old female in good appearance, mood and affect.  Alert and orientated times three ( time, date and location).    Skin: There is a nodule that is palpable over the PIP joint of the right index finger.  " It is tender to palpation.    Hand:  Yes there is a mass on the PIP joint dorsal aspect right index finger.  no triggering of the fingers.  Thenar wasting: None.  Hypothenar wasting: None.  Sensation: Intact.  Radial and ulnar blood flow: Intact.    Shoulder:  Motion: Full motion.    Elbow:  Motion: Full motion.    Eyes: Pupils are round and she wears glasses.    Ears: Hearing: Intact.    Heart: Good capillary refill into her hands pulses are regular.    Lungs: Coarse breath sounds.    Radiographic images from 2/19 where independently reviewed and discussed with the patient.      Xray:    X-rays demonstrate an old injury to the PIP joint of the right index finger.  There is a small well and capsulated cyst in the bone of the base of the middle phalanx that is away from the soft tissue mass that is on the dorsal aspect at the PIP joint.    Exam: XR FINGER RT G/E 2 VW     History:Female, age 27 years, ; Pain of finger of right hand    Comparison: None    Technique: Three views are submitted.    Findings: Bones are normally mineralized. No evidence of acute or  subacute fracture. No evidence of dislocation.      Impression:  1. No evidence of acute or subacute bony abnormality.    LISA VAZQUEZ MD    IMPRESSION:  Mass dorsal aspect over the PIP joint right index finger.  Old history of trauma with a well encapsulated mass base of the middle phalanx right index finger.  This probably represents a previously old fracture.    PLAN:  Risks, benefits, conservative, surgical and alternatives to treatment where discussed and the patient would like to proceed with consideration of surgery.  We did discuss the risks of having a mass removed and the possibility of this recurring she verbalized an understanding.  She will continue to protect it as she does her activities.  Her questions and concerns were answered.  She is going to set up a surgical date and received instruction here today.  Follow-up approximately 2 weeks  after surgery.  The mass will be sent for pathological examination.  She understands that if this does extend into the tendon that she may be in a splint after surgery.    I have discussed options with Ita Poole for the treatment for a mass which have included observation, aspiration and surgical excision. I discussed pros and cons of each approach, and at this point, Ita Poole would like to proceed with mass excision surgery. We discussed that surgery would be an outpatient surgery, you would be able to go home following the surgery. We will plan on open mass excision.  Surgical anesthesia would be general anesthesia and anesthesia will discuss options.  I discussed following surgery Ita Poole may be in a splint until seen in the office and they can work on gentle motion after surgery.  There may be a need for occupational or physical therapy after surgery.   Full recovery from mass excision surgery may take a year. The goal will be pain relief. Complications were discussed including continued pain, stiffness, neurovascular damage, potential chance of infection. If deep infection were to occur, further surgery may be needed with repeat washout in the operating room with possible need for treatment with antibiotics.    Risks, benefits, conservative, surgical, and alternatives of treatment were thoroughly outlined. No guarantees were given. Risks which do include, but are not limited to:  Scar, infection, decreased motion, damage to blood vessels, nerves and tendons, failure or need for further treatment, reaction to medications and anesthesia, blood clots, and the possibility of death where discussed.  She did verbalize an understanding. All questions and concerns were addressed.    Patient is set up for open right index finger mass excision surgery.    Ita Poole will need pre op clearance for management of depression and cardiac and pulmonary evaluation for surgery.    Follow up after surgery will be  12-14 days.    All questions where answered to the patients satisfaction.    Nate Allen D.O.  Orthopaedic Surgeon    [facility]  [address] [place] [address]  Phone [phone] (KNEE)  Fax [phone]    Disclaimer:  This note consists of words and symbols derived from keyboarding, dictation, or using voice recognition software. As a result, there may be errors in the script that have gone undetected. Please consider this when interpreting information found in this note.    10:33 AM 2/19/2018

## 2018-02-20 ENCOUNTER — DOCUMENTATION ONLY (OUTPATIENT)
Dept: FAMILY MEDICINE | Facility: OTHER | Age: 28
End: 2018-02-20

## 2018-02-26 ENCOUNTER — ANESTHESIA EVENT (OUTPATIENT)
Dept: SURGERY | Facility: OTHER | Age: 28
End: 2018-02-26
Payer: COMMERCIAL

## 2018-02-27 ENCOUNTER — OFFICE VISIT (OUTPATIENT)
Dept: FAMILY MEDICINE | Facility: OTHER | Age: 28
End: 2018-02-27
Attending: FAMILY MEDICINE
Payer: COMMERCIAL

## 2018-02-27 VITALS
TEMPERATURE: 98.1 F | OXYGEN SATURATION: 96 % | RESPIRATION RATE: 20 BRPM | HEART RATE: 96 BPM | DIASTOLIC BLOOD PRESSURE: 84 MMHG | SYSTOLIC BLOOD PRESSURE: 124 MMHG | BODY MASS INDEX: 41.62 KG/M2 | WEIGHT: 226.2 LBS | HEIGHT: 62 IN

## 2018-02-27 DIAGNOSIS — E66.09 CLASS 2 OBESITY DUE TO EXCESS CALORIES WITHOUT SERIOUS COMORBIDITY IN ADULT, UNSPECIFIED BMI: ICD-10-CM

## 2018-02-27 DIAGNOSIS — F33.1 MAJOR DEPRESSIVE DISORDER, RECURRENT EPISODE, MODERATE (H): ICD-10-CM

## 2018-02-27 DIAGNOSIS — R22.31 MASS OF FINGER OF RIGHT HAND: ICD-10-CM

## 2018-02-27 DIAGNOSIS — K90.0 CELIAC DISEASE: ICD-10-CM

## 2018-02-27 DIAGNOSIS — Z01.818 PREOP GENERAL PHYSICAL EXAM: Primary | ICD-10-CM

## 2018-02-27 DIAGNOSIS — E66.812 CLASS 2 OBESITY DUE TO EXCESS CALORIES WITHOUT SERIOUS COMORBIDITY IN ADULT, UNSPECIFIED BMI: ICD-10-CM

## 2018-02-27 LAB
BASOPHILS # BLD AUTO: 0.1 10E9/L (ref 0–0.2)
BASOPHILS NFR BLD AUTO: 0.7 %
DIFFERENTIAL METHOD BLD: NORMAL
EOSINOPHIL # BLD AUTO: 0.1 10E9/L (ref 0–0.7)
EOSINOPHIL NFR BLD AUTO: 1.1 %
ERYTHROCYTE [DISTWIDTH] IN BLOOD BY AUTOMATED COUNT: 12.2 % (ref 10–15)
HCG UR QL: NEGATIVE
HCT VFR BLD AUTO: 44.8 % (ref 35–47)
HGB BLD-MCNC: 15 G/DL (ref 11.7–15.7)
IMM GRANULOCYTES # BLD: 0.1 10E9/L (ref 0–0.4)
IMM GRANULOCYTES NFR BLD: 0.7 %
LYMPHOCYTES # BLD AUTO: 2.8 10E9/L (ref 0.8–5.3)
LYMPHOCYTES NFR BLD AUTO: 26.5 %
MCH RBC QN AUTO: 29.1 PG (ref 26.5–33)
MCHC RBC AUTO-ENTMCNC: 33.5 G/DL (ref 31.5–36.5)
MCV RBC AUTO: 87 FL (ref 78–100)
MONOCYTES # BLD AUTO: 0.8 10E9/L (ref 0–1.3)
MONOCYTES NFR BLD AUTO: 8 %
NEUTROPHILS # BLD AUTO: 6.6 10E9/L (ref 1.6–8.3)
NEUTROPHILS NFR BLD AUTO: 63 %
PLATELET # BLD AUTO: 235 10E9/L (ref 150–450)
RBC # BLD AUTO: 5.15 10E12/L (ref 3.8–5.2)
WBC # BLD AUTO: 10.5 10E9/L (ref 4–11)

## 2018-02-27 PROCEDURE — 99214 OFFICE O/P EST MOD 30 MIN: CPT | Performed by: FAMILY MEDICINE

## 2018-02-27 PROCEDURE — 81025 URINE PREGNANCY TEST: CPT | Performed by: FAMILY MEDICINE

## 2018-02-27 PROCEDURE — G0463 HOSPITAL OUTPT CLINIC VISIT: HCPCS

## 2018-02-27 PROCEDURE — 36415 COLL VENOUS BLD VENIPUNCTURE: CPT | Performed by: FAMILY MEDICINE

## 2018-02-27 PROCEDURE — 85025 COMPLETE CBC W/AUTO DIFF WBC: CPT | Performed by: FAMILY MEDICINE

## 2018-02-27 ASSESSMENT — PAIN SCALES - GENERAL: PAINLEVEL: NO PAIN (0)

## 2018-02-27 NOTE — MR AVS SNAPSHOT
After Visit Summary   2/27/2018    Ita Poole    MRN: [id number]           Patient Information     Date Of Birth          1990        Visit Information        Provider Department      2/27/2018 3:15 PM Will Reynoso MD [facility]        Today's Diagnoses     Preop general physical exam    -  1    Mass of right index finger at the PIP joint        Celiac disease        Major depressive disorder, recurrent episode, moderate (H)        Class 2 obesity due to excess calories without serious comorbidity in adult, unspecified BMI          Care Instructions      Before Your Surgery      Call your surgeon if there is any change in your health. This includes signs of a cold or flu (such as a sore throat, runny nose, cough, rash or fever).    Do not smoke, drink alcohol or take over the counter medicine (unless your surgeon or primary care doctor tells you to) for the 24 hours before and after surgery.    If you take prescribed drugs: Follow your doctor s orders about which medicines to take and which to stop until after surgery.    Eating and drinking prior to surgery: follow the instructions from your surgeon    Take a shower or bath the night before surgery. Use the soap your surgeon gave you to gently clean your skin. If you do not have soap from your surgeon, use your regular soap. Do not shave or scrub the surgery site.  Wear clean pajamas and have clean sheets on your bed.           Follow-ups after your visit        Your next 10 appointments already scheduled     Feb 28, 2018   Procedure with Nate Allen DO   [facility] ([facility]    [address]  [place] [address]   [phone]            Mar 19, 2018  1:15 PM CDT   Return Visit with Nate Allen DO   [facility] ([facility])    [address]  [place] [address]   [phone]              Who  "to contact     If you have questions or need follow up information about today's clinic visit or your schedule please contact [facility] directly at [phone].  Normal or non-critical lab and imaging results will be communicated to you by fitkithart, letter or phone within 4 business days after the clinic has received the results. If you do not hear from us within 7 days, please contact the clinic through fitkithart or phone. If you have a critical or abnormal lab result, we will notify you by phone as soon as possible.  Submit refill requests through [employer] or call your pharmacy and they will forward the refill request to us. Please allow 3 business days for your refill to be completed.          Additional Information About Your Visit        [employer] Information     [employer] lets you send messages to your doctor, view your test results, renew your prescriptions, schedule appointments and more. To sign up, go to [email or web address] . Click on \"Log in\" on the left side of the screen, which will take you to the Welcome page. Then click on \"Sign up Now\" on the right side of the page.     You will be asked to enter the access code listed below, as well as some personal information. Please follow the directions to create your username and password.     Your access code is: [email or web address]  Expires: 2018 10:39 AM     Your access code will  in 90 days. If you need help or a new code, please call your [facility] clinic or [phone].        Care EveryWhere ID     This is your Care EveryWhere ID. This could be used by other organizations to access your [facility] medical records  [id number]        Your Vitals Were     Pulse Temperature Respirations Height Last Period Pulse Oximetry    96 98.1  F (36.7  C) (Tympanic) 20 5' 1.75\" (1.568 m) 2018 96%    Breastfeeding? BMI (Body Mass Index)                No 41.71 kg/m2           Blood Pressure from Last 3 Encounters:   18 124/84 "   02/19/18 120/80   02/06/18 132/80    Weight from Last 3 Encounters:   02/27/18 226 lb 3.2 oz (102.6 kg)   02/19/18 230 lb (104.3 kg)   02/06/18 224 lb 6.4 oz (101.8 kg)              We Performed the Following     CBC with platelets differential     HCG Qual, Urine - CSC and Range ([id number])        Primary Care Provider Office Phone # Fax #    Will WILLIS MD [place] [phone] [address] [facility] [address]        Equal Access to Services     [facility]: Hadii aad ku hadasho Soomaali, waaxda luqadaha, qaybta kaalmada ademichelleyaalley, johnna sultana . So [facility] [phone].    ATENCIÓN: Si habla español, tiene a alanis disposición servicios gratuitos de asistencia lingüística. [facility] [phone].    We comply with applicable federal civil rights laws and [place] laws. We do not discriminate on the basis of race, color, national origin, age, disability, sex, sexual orientation, or gender identity.            Thank you!     Thank you for choosing [facility] AND [facility]  for your care. Our goal is always to provide you with excellent care. Hearing back from our patients is one way we can continue to improve our services. Please take a few minutes to complete the written survey that you may receive in the mail after your visit with us. Thank you!             Your Updated Medication List - Protect others around you: Learn how to safely use, store and throw away your medicines at www.disposemymeds.org.          This list is accurate as of 2/27/18  5:24 PM.  Always use your most recent med list.                   Brand Name Dispense Instructions for use Diagnosis    albuterol 108 (90 BASE) MCG/ACT Inhaler    PROAIR HFA/PROVENTIL HFA/VENTOLIN HFA     Inhale 2 puffs into the lungs every 4 hours as needed for wheezing        cyclobenzaprine 10 MG tablet    FLEXERIL     Take 10 mg by mouth 3 times daily as needed for muscle spasms        EPI E-Z PEN LYNETTE 1:1000   IJ     1    1 TIME ONLY    Swelling, mass, or lump in head and neck       ibuprofen 800 MG tablet    ADVIL/MOTRIN     Take 800 mg by mouth 3 times daily as needed for pain        risperiDONE 0.5 MG tablet    risperDAL     Take 1 tablet (0.5 mg) by mouth twice a day at bedtime and as needed        sertraline 100 MG tablet    ZOLOFT     Take 150 mg by mouth daily

## 2018-02-27 NOTE — PROGRESS NOTES
[facility]  [address]  [place] [address]  [phone]    PRE-OP EVALUATION:  Today's date: 2018    Ita Poole (: 1990) presents for pre-operative evaluation assessment as requested by Dr. Allen.  She requires evaluation and anesthesia risk assessment prior to undergoing surgery/procedure for treatment of Excise Mass Upper Extremity .    Proposed Surgery/ Procedure: Excision of mass  right index finger  Date of Surgery/ Procedure: 2018  Time of Surgery/ Procedure: 8:30 am  Hospital/Surgical Facility: [facility]    Primary Physician: Will Reynoso  Type of Anesthesia Anticipated: to be determined    Patient has a Health Care Directive or Living Will:  NO    1. NO - Do you have a history of heart attack, stroke, stent, bypass or surgery on an artery in the head, neck, heart or legs?  2. NO - Do you ever have any pain or discomfort in your chest?  3. NO - Do you have a history of  Heart Failure?  4. NO - Are you troubled by shortness of breath when: walking on the level, up a slight hill or at night?  5. NO - Do you currently have a cold, bronchitis or other respiratory infection?  6. NO - Do you have a cough, shortness of breath or wheezing?  7. NO - Do you sometimes get pains in the calves of your legs when you walk?  8. NO - Do you or anyone in your family have previous history of blood clots?  9. NO - Do you or does anyone in your family have a serious bleeding problem such as prolonged bleeding following surgeries or cuts?  10. NO - Have you ever had problems with anemia or been told to take iron pills?  11. NO - Have you had any abnormal blood loss such as black, tarry or bloody stools, or abnormal vaginal bleeding?  12. NO - Have you ever had a blood transfusion?  13. NO - Have you or any of your relatives ever had problems with anesthesia?  14. NO - Do you have sleep apnea, excessive snoring or daytime drowsiness?  15. NO - Do you have any  prosthetic heart valves?  16. NO - Do you have prosthetic joints?  17. NO - Is there any chance that you may be pregnant?      HPI:     HPI related to upcoming procedure: painful mass of her right index finger      See problem list for active medical problems.  Problems all longstanding and stable, except as noted/documented.  See ROS for pertinent symptoms related to these conditions.                                                                                                  .    MEDICAL HISTORY:     Patient Active Problem List    Diagnosis Date Noted     Mass of right index finger at the PIP joint 2018     Priority: Medium     Cold thyroid nodule 2017     Priority: Medium     Obesity 2017     Priority: Medium     Irregular menses 2016     Priority: Medium     H/O  section 2014     Priority: Medium     Major depressive disorder, recurrent episode, moderate (H) 2013     Priority: Medium     Overview:   Dx added per office visit on 12 with Dr. Burch.  Fátima Baltazar RN, Clinical , [facility], 2013 5:02 PM       Asthma 2008     Priority: Medium     Vitamin D deficiency 2007     Priority: Medium     Problem list name updated by automated process. Provider to review       Angioedema 2006     Priority: Medium     Problem list name updated by automated process. Provider to review       Celiac disease 2006     Priority: Medium     Abdominal pain, generalized 2006     Priority: Medium     C.diff, admitted for obs. X 24 hrs. Flagyl started as outpt. Laura Moffett MD         Depressive disorder, not elsewhere classified 10/28/2005     Priority: Medium     Followed at [facility], meds by Marnie Marsh RN       Moderate persistent asthma 2005     Priority: Medium     aap completed         Allergic rhinitis 2005     Priority: Medium     Problem list name updated by  automated process. Provider to review       Migraine 2005     Priority: Medium     Problem list name updated by automated process. Provider to review        Past Medical History:   Diagnosis Date     Allergic rhinitis     No Comments Provided     Celiac disease     No Comments Provided     Mass of right index finger at the PIP joint 2018     Uncomplicated asthma     No Comments Provided     Past Surgical History:   Procedure Laterality Date      SECTION      ,4/10/14, Section     COLONOSCOPY      age 10     ESOPHAGOSCOPY, GASTROSCOPY, DUODENOSCOPY (EGD), COMBINED      [id number],ESOPHAGOGASTRODUODENOSCOPY,age 10, small bowel bx: celiac disease     LAPAROSCOPIC CHOLECYSTECTOMY      9/22/15,Cholecystectomy,Laparoscopic     Current Outpatient Prescriptions   Medication Sig Dispense Refill     sertraline (ZOLOFT) 100 MG tablet Take 150 mg by mouth daily       cyclobenzaprine (FLEXERIL) 10 MG tablet Take 10 mg by mouth 3 times daily as needed for muscle spasms       risperiDONE (RISPERDAL) 0.5 MG tablet Take 1 tablet (0.5 mg) by mouth twice a day at bedtime and as needed       albuterol (PROAIR HFA/PROVENTIL HFA/VENTOLIN HFA) 108 (90 BASE) MCG/ACT Inhaler Inhale 2 puffs into the lungs every 4 hours as needed for wheezing       ibuprofen (ADVIL/MOTRIN) 800 MG tablet Take 800 mg by mouth 3 times daily as needed for pain       EPI E-Z PEN LYNETTE 1:1000  IJ 1 TIME ONLY 1 0     OTC products: None, except as noted above    Allergies   Allergen Reactions     Amoxicillin-Pot Clavulanate Hives     Augmentin Diarrhea     Yeast Infection in colon     Azithromycin Nausea     Azithromycin      Can't take because it contains wheat     Codeine      constipation     Gluten      Gluten Meal      Other reaction(s): Constipation  Celiac disease, abdominal pain      Latex Allergy: NO    Social History   Substance Use Topics     Smoking status: Never Smoker     Smokeless tobacco: Never Used      Comment: Quit smoking:  "passive exposure     Alcohol use No     History   Drug Use Not on file     Comment: Drug use: No       REVIEW OF SYSTEMS:   ROS is negativeexcept as noted above              EXAM:   /84 (BP Location: Right arm, Patient Position: Sitting, Cuff Size: Adult Large)  Pulse 96  Temp 98.1  F (36.7  C) (Tympanic)  Resp 20  Ht 5' 1.75\" (1.568 m)  Wt 226 lb 3.2 oz (102.6 kg)  LMP 02/02/2018  SpO2 96%  Breastfeeding? No  BMI 41.71 kg/m2  General Appearance: Pleasant, alert, appropriate appearance for age. No acute distress  Head Exam: Normal. Normocephalic, atraumatic.  Eye Exam: PERRLA, EOMI, conjunctiva, sclerae normal.  Ear Exam: Normal TM's bilaterally. Normal auditory canals and externalears. Non-tender.  Nose Exam: Normal external nose, mucus membranes, and septum.  OroPharynx Exam:  Dental hygiene adequate. Normal buccal mucose. Normal pharynx.  Neck Exam:  Supple, no masses or nodes. No bruits  Thyroid Exam: No nodules or enlargement.  Chest/Respiratory Exam: Normal chest wall and respirations. Clear to auscultation.  Cardiovascular Exam: Regular rate and rhythm. S1, S2, no murmur, click, gallop, or rubs.  Gastrointestinal Exam: Soft, non-tender, no masses or organomegaly.  Lymphatic Exam: Non-palpable nodes in neck,clavicular, axillary, or inguinal regions.  Musculoskeletal Exam: Mass of the IP joint of the right index finger likely due to ganglion cyst  Foot Exam: Left and right foot: good pedal pulses  Skin: no rash or abnormalities  Neurologic Exam:  normal gross motor, tone coordination and no tremor.  Psychiatric Exam: Alert and oriented - appropriate affect.     DIAGNOSTICS:     Labs Resulted Today:   Results for orders placed or performed in visit on 02/27/18   CBC with platelets differential   Result Value Ref Range    WBC 10.5 4.0 - 11.0 10e9/L    RBC Count 5.15 3.8 - 5.2 10e12/L    Hemoglobin 15.0 11.7 - 15.7 g/dL    Hematocrit 44.8 35.0 - 47.0 %    MCV 87 78 - 100 fl    MCH 29.1 26.5 - 33.0 " pg    MCHC 33.5 31.5 - 36.5 g/dL    RDW 12.2 10.0 - 15.0 %    Platelet Count 235 150 - 450 10e9/L    Diff Method Automated Method     % Neutrophils 63.0 %    % Lymphocytes 26.5 %    % Monocytes 8.0 %    % Eosinophils 1.1 %    % Basophils 0.7 %    % Immature Granulocytes 0.7 %    Absolute Neutrophil 6.6 1.6 - 8.3 10e9/L    Absolute Lymphocytes 2.8 0.8 - 5.3 10e9/L    Absolute Monocytes 0.8 0.0 - 1.3 10e9/L    Absolute Eosinophils 0.1 0.0 - 0.7 10e9/L    Absolute Basophils 0.1 0.0 - 0.2 10e9/L    Abs Immature Granulocytes 0.1 0 - 0.4 10e9/L   HCG Qual, Urine - CSC and Range ([id number])   Result Value Ref Range    HCG Qual Urine Negative NEG^Negative       Recent Labs   Lab Test  10/18/16   1832  10/18/16   1815  06/27/16   1556  06/27/16   1536   HGB   --   13.3   --   13.7   PLT   --   305   --   267   NA  138   --   134   --    POTASSIUM  3.8   --   3.7   --    CR  0.71   --   0.76   --       Results for orders placed or performed in visit on 02/27/18   CBC with platelets differential   Result Value Ref Range    WBC 10.5 4.0 - 11.0 10e9/L    RBC Count 5.15 3.8 - 5.2 10e12/L    Hemoglobin 15.0 11.7 - 15.7 g/dL    Hematocrit 44.8 35.0 - 47.0 %    MCV 87 78 - 100 fl    MCH 29.1 26.5 - 33.0 pg    MCHC 33.5 31.5 - 36.5 g/dL    RDW 12.2 10.0 - 15.0 %    Platelet Count 235 150 - 450 10e9/L    Diff Method Automated Method     % Neutrophils 63.0 %    % Lymphocytes 26.5 %    % Monocytes 8.0 %    % Eosinophils 1.1 %    % Basophils 0.7 %    % Immature Granulocytes 0.7 %    Absolute Neutrophil 6.6 1.6 - 8.3 10e9/L    Absolute Lymphocytes 2.8 0.8 - 5.3 10e9/L    Absolute Monocytes 0.8 0.0 - 1.3 10e9/L    Absolute Eosinophils 0.1 0.0 - 0.7 10e9/L    Absolute Basophils 0.1 0.0 - 0.2 10e9/L    Abs Immature Granulocytes 0.1 0 - 0.4 10e9/L   HCG Qual, Urine - CSC and Range ([id number])   Result Value Ref Range    HCG Qual Urine Negative NEG^Negative      IMPRESSION:   Reason for surgery/procedure: painful mass of finger  Diagnosis/reason  for consult: preop    The proposed surgical procedure is considered LOW risk.    REVISED CARDIAC RISK INDEX  The patient has the following serious cardiovascular risks for perioperative complications such as (MI, PE, VFib and 3  AV Block):  No serious cardiac risks  INTERPRETATION: 0 risks: Class I (very low risk - 0.4% complication rate)    The patient has the following additional risks for perioperative complications:  No identified additional risks      ICD-10-CM    1. Preop general physical exam Z01.818 CBC with platelets differential     HCG Qual, Urine - CSC and Range ([id number])   2. Mass of right index finger at the PIP joint R22.31    3. Celiac disease K90.0    4. Major depressive disorder, recurrent episode, moderate (H) F33.1    5. Class 2 obesity due to excess calories without serious comorbidity in adult, unspecified BMI E66.09        RECOMMENDATIONS:       --Patient is to take all scheduled medications on the day of surgery EXCEPT for modifications listed below.    APPROVAL GIVEN to proceed with proposed procedure, without further diagnostic evaluation       Signed Electronically by: Will Reynoso MD    Copy of this evaluation report is provided to requesting physician.    [facility] Preop Guidelines

## 2018-02-28 ENCOUNTER — HOSPITAL ENCOUNTER (OUTPATIENT)
Facility: OTHER | Age: 28
Discharge: HOME | End: 2018-02-28
Attending: ORTHOPAEDIC SURGERY | Admitting: ORTHOPAEDIC SURGERY
Payer: COMMERCIAL

## 2018-02-28 ENCOUNTER — ANESTHESIA (OUTPATIENT)
Dept: SURGERY | Facility: OTHER | Age: 28
End: 2018-02-28
Payer: COMMERCIAL

## 2018-02-28 VITALS
RESPIRATION RATE: 13 BRPM | HEART RATE: 82 BPM | TEMPERATURE: 98.1 F | OXYGEN SATURATION: 97 % | DIASTOLIC BLOOD PRESSURE: 97 MMHG | SYSTOLIC BLOOD PRESSURE: 126 MMHG

## 2018-02-28 DIAGNOSIS — Z98.890 HISTORY OF EXCISION OF MASS: Primary | ICD-10-CM

## 2018-02-28 PROCEDURE — 27210794 ZZH OR GENERAL SUPPLY STERILE: Performed by: ORTHOPAEDIC SURGERY

## 2018-02-28 PROCEDURE — 26160 REMOVE TENDON SHEATH LESION: CPT | Performed by: ORTHOPAEDIC SURGERY

## 2018-02-28 PROCEDURE — 36000050 ZZH SURGERY LEVEL 2 1ST 30 MIN: Performed by: ORTHOPAEDIC SURGERY

## 2018-02-28 PROCEDURE — 25000132 ZZH RX MED GY IP 250 OP 250 PS 637: Performed by: ORTHOPAEDIC SURGERY

## 2018-02-28 PROCEDURE — 88304 TISSUE EXAM BY PATHOLOGIST: CPT

## 2018-02-28 PROCEDURE — 26160 REMOVE TENDON SHEATH LESION: CPT | Performed by: ANESTHESIOLOGY

## 2018-02-28 PROCEDURE — 25000125 ZZHC RX 250: Performed by: ANESTHESIOLOGY

## 2018-02-28 PROCEDURE — 25000125 ZZHC RX 250: Performed by: NURSE ANESTHETIST, CERTIFIED REGISTERED

## 2018-02-28 PROCEDURE — 25000128 H RX IP 250 OP 636: Performed by: NURSE ANESTHETIST, CERTIFIED REGISTERED

## 2018-02-28 PROCEDURE — 71000027 ZZH RECOVERY PHASE 2 EACH 15 MINS: Performed by: ORTHOPAEDIC SURGERY

## 2018-02-28 PROCEDURE — 25000125 ZZHC RX 250: Performed by: ORTHOPAEDIC SURGERY

## 2018-02-28 PROCEDURE — 27210995 ZZH RX 272: Performed by: ORTHOPAEDIC SURGERY

## 2018-02-28 PROCEDURE — 40000306 ZZH STATISTIC PRE PROC ASSESS II: Performed by: ORTHOPAEDIC SURGERY

## 2018-02-28 PROCEDURE — 25000128 H RX IP 250 OP 636: Performed by: ORTHOPAEDIC SURGERY

## 2018-02-28 PROCEDURE — 25000128 H RX IP 250 OP 636: Performed by: ANESTHESIOLOGY

## 2018-02-28 PROCEDURE — 36000052 ZZH SURGERY LEVEL 2 EA 15 ADDTL MIN: Performed by: ORTHOPAEDIC SURGERY

## 2018-02-28 PROCEDURE — 25000566 ZZH SEVOFLURANE, EA 15 MIN: Performed by: ORTHOPAEDIC SURGERY

## 2018-02-28 PROCEDURE — 71000014 ZZH RECOVERY PHASE 1 LEVEL 2 FIRST HR: Performed by: ORTHOPAEDIC SURGERY

## 2018-02-28 PROCEDURE — 37000009 ZZH ANESTHESIA TECHNICAL FEE, EACH ADDTL 15 MIN: Performed by: ORTHOPAEDIC SURGERY

## 2018-02-28 PROCEDURE — 26160 REMOVE TENDON SHEATH LESION: CPT | Performed by: NURSE ANESTHETIST, CERTIFIED REGISTERED

## 2018-02-28 PROCEDURE — 37000008 ZZH ANESTHESIA TECHNICAL FEE, 1ST 30 MIN: Performed by: ORTHOPAEDIC SURGERY

## 2018-02-28 RX ORDER — HYDRALAZINE HYDROCHLORIDE 20 MG/ML
2.5-5 INJECTION INTRAMUSCULAR; INTRAVENOUS EVERY 10 MIN PRN
Status: DISCONTINUED | OUTPATIENT
Start: 2018-02-28 | End: 2018-02-28 | Stop reason: HOSPADM

## 2018-02-28 RX ORDER — ACETAMINOPHEN 325 MG/1
650 TABLET ORAL
Status: DISCONTINUED | OUTPATIENT
Start: 2018-02-28 | End: 2018-02-28 | Stop reason: HOSPADM

## 2018-02-28 RX ORDER — SCOLOPAMINE TRANSDERMAL SYSTEM 1 MG/1
1 PATCH, EXTENDED RELEASE TRANSDERMAL
Status: DISCONTINUED | OUTPATIENT
Start: 2018-02-28 | End: 2018-02-28 | Stop reason: HOSPADM

## 2018-02-28 RX ORDER — FENTANYL CITRATE 50 UG/ML
50 INJECTION, SOLUTION INTRAMUSCULAR; INTRAVENOUS
Status: DISCONTINUED | OUTPATIENT
Start: 2018-02-28 | End: 2018-02-28 | Stop reason: HOSPADM

## 2018-02-28 RX ORDER — ACETAMINOPHEN 325 MG/1
975 TABLET ORAL ONCE
Status: DISCONTINUED | OUTPATIENT
Start: 2018-02-28 | End: 2018-02-28 | Stop reason: HOSPADM

## 2018-02-28 RX ORDER — DEXAMETHASONE SODIUM PHOSPHATE 10 MG/ML
INJECTION, SOLUTION INTRAMUSCULAR; INTRAVENOUS PRN
Status: DISCONTINUED | OUTPATIENT
Start: 2018-02-28 | End: 2018-02-28

## 2018-02-28 RX ORDER — NALOXONE HYDROCHLORIDE 0.4 MG/ML
.1-.4 INJECTION, SOLUTION INTRAMUSCULAR; INTRAVENOUS; SUBCUTANEOUS
Status: DISCONTINUED | OUTPATIENT
Start: 2018-02-28 | End: 2018-02-28 | Stop reason: HOSPADM

## 2018-02-28 RX ORDER — PROPOFOL 10 MG/ML
INJECTION, EMULSION INTRAVENOUS CONTINUOUS PRN
Status: DISCONTINUED | OUTPATIENT
Start: 2018-02-28 | End: 2018-02-28

## 2018-02-28 RX ORDER — ONDANSETRON 2 MG/ML
4 INJECTION INTRAMUSCULAR; INTRAVENOUS EVERY 30 MIN PRN
Status: DISCONTINUED | OUTPATIENT
Start: 2018-02-28 | End: 2018-02-28 | Stop reason: HOSPADM

## 2018-02-28 RX ORDER — HYDROCODONE BITARTRATE AND ACETAMINOPHEN 5; 325 MG/1; MG/1
1 TABLET ORAL
Status: DISCONTINUED | OUTPATIENT
Start: 2018-02-28 | End: 2018-02-28 | Stop reason: HOSPADM

## 2018-02-28 RX ORDER — ONDANSETRON 2 MG/ML
INJECTION INTRAMUSCULAR; INTRAVENOUS PRN
Status: DISCONTINUED | OUTPATIENT
Start: 2018-02-28 | End: 2018-02-28

## 2018-02-28 RX ORDER — HYDROCODONE BITARTRATE AND ACETAMINOPHEN 5; 325 MG/1; MG/1
1-2 TABLET ORAL EVERY 4 HOURS PRN
Qty: 5 TABLET | Refills: 0 | Status: SHIPPED | OUTPATIENT
Start: 2018-02-28 | End: 2018-03-19

## 2018-02-28 RX ORDER — LIDOCAINE HYDROCHLORIDE AND EPINEPHRINE 10; 10 MG/ML; UG/ML
INJECTION, SOLUTION INFILTRATION; PERINEURAL PRN
Status: DISCONTINUED | OUTPATIENT
Start: 2018-02-28 | End: 2018-02-28 | Stop reason: HOSPADM

## 2018-02-28 RX ORDER — FENTANYL CITRATE 50 UG/ML
25-50 INJECTION, SOLUTION INTRAMUSCULAR; INTRAVENOUS
Status: DISCONTINUED | OUTPATIENT
Start: 2018-02-28 | End: 2018-02-28 | Stop reason: HOSPADM

## 2018-02-28 RX ORDER — ONDANSETRON 4 MG/1
4 TABLET, ORALLY DISINTEGRATING ORAL EVERY 30 MIN PRN
Status: DISCONTINUED | OUTPATIENT
Start: 2018-02-28 | End: 2018-02-28 | Stop reason: HOSPADM

## 2018-02-28 RX ORDER — SODIUM CHLORIDE, SODIUM LACTATE, POTASSIUM CHLORIDE, CALCIUM CHLORIDE 600; 310; 30; 20 MG/100ML; MG/100ML; MG/100ML; MG/100ML
INJECTION, SOLUTION INTRAVENOUS CONTINUOUS
Status: DISCONTINUED | OUTPATIENT
Start: 2018-02-28 | End: 2018-02-28 | Stop reason: HOSPADM

## 2018-02-28 RX ORDER — FENTANYL CITRATE 50 UG/ML
INJECTION, SOLUTION INTRAMUSCULAR; INTRAVENOUS PRN
Status: DISCONTINUED | OUTPATIENT
Start: 2018-02-28 | End: 2018-02-28

## 2018-02-28 RX ORDER — PANTOPRAZOLE SODIUM 40 MG/1
40 TABLET, DELAYED RELEASE ORAL
Status: COMPLETED | OUTPATIENT
Start: 2018-02-28 | End: 2018-02-28

## 2018-02-28 RX ORDER — BUPIVACAINE HYDROCHLORIDE 2.5 MG/ML
INJECTION, SOLUTION EPIDURAL; INFILTRATION; INTRACAUDAL PRN
Status: DISCONTINUED | OUTPATIENT
Start: 2018-02-28 | End: 2018-02-28 | Stop reason: HOSPADM

## 2018-02-28 RX ORDER — LIDOCAINE 40 MG/G
CREAM TOPICAL
Status: DISCONTINUED | OUTPATIENT
Start: 2018-02-28 | End: 2018-02-28 | Stop reason: HOSPADM

## 2018-02-28 RX ORDER — FENTANYL CITRATE 50 UG/ML
25-50 INJECTION, SOLUTION INTRAMUSCULAR; INTRAVENOUS EVERY 5 MIN PRN
Status: DISCONTINUED | OUTPATIENT
Start: 2018-02-28 | End: 2018-02-28 | Stop reason: HOSPADM

## 2018-02-28 RX ORDER — MEPERIDINE HYDROCHLORIDE 50 MG/ML
12.5 INJECTION INTRAMUSCULAR; INTRAVENOUS; SUBCUTANEOUS
Status: DISCONTINUED | OUTPATIENT
Start: 2018-02-28 | End: 2018-02-28 | Stop reason: HOSPADM

## 2018-02-28 RX ORDER — HYDROMORPHONE HYDROCHLORIDE 1 MG/ML
.3-.5 INJECTION, SOLUTION INTRAMUSCULAR; INTRAVENOUS; SUBCUTANEOUS EVERY 10 MIN PRN
Status: DISCONTINUED | OUTPATIENT
Start: 2018-02-28 | End: 2018-02-28 | Stop reason: HOSPADM

## 2018-02-28 RX ORDER — PROPOFOL 10 MG/ML
INJECTION, EMULSION INTRAVENOUS PRN
Status: DISCONTINUED | OUTPATIENT
Start: 2018-02-28 | End: 2018-02-28

## 2018-02-28 RX ORDER — CEFAZOLIN SODIUM 2 G/100ML
2 INJECTION, SOLUTION INTRAVENOUS
Status: COMPLETED | OUTPATIENT
Start: 2018-02-28 | End: 2018-02-28

## 2018-02-28 RX ORDER — MAGNESIUM HYDROXIDE 1200 MG/15ML
LIQUID ORAL PRN
Status: DISCONTINUED | OUTPATIENT
Start: 2018-02-28 | End: 2018-02-28 | Stop reason: HOSPADM

## 2018-02-28 RX ORDER — LIDOCAINE HYDROCHLORIDE 20 MG/ML
INJECTION, SOLUTION INFILTRATION; PERINEURAL PRN
Status: DISCONTINUED | OUTPATIENT
Start: 2018-02-28 | End: 2018-02-28

## 2018-02-28 RX ADMIN — FENTANYL CITRATE 50 MCG: 50 INJECTION, SOLUTION INTRAMUSCULAR; INTRAVENOUS at 09:48

## 2018-02-28 RX ADMIN — MIDAZOLAM HYDROCHLORIDE 2 MG: 1 INJECTION, SOLUTION INTRAMUSCULAR; INTRAVENOUS at 09:41

## 2018-02-28 RX ADMIN — PROPOFOL 200 MG: 10 INJECTION, EMULSION INTRAVENOUS at 09:44

## 2018-02-28 RX ADMIN — FENTANYL CITRATE 50 MCG: 50 INJECTION, SOLUTION INTRAMUSCULAR; INTRAVENOUS at 09:55

## 2018-02-28 RX ADMIN — CEFAZOLIN SODIUM 2 G: 2 INJECTION, SOLUTION INTRAVENOUS at 09:38

## 2018-02-28 RX ADMIN — LIDOCAINE HYDROCHLORIDE 40 MG: 20 INJECTION, SOLUTION INFILTRATION; PERINEURAL at 09:44

## 2018-02-28 RX ADMIN — SODIUM CHLORIDE, SODIUM LACTATE, POTASSIUM CHLORIDE, AND CALCIUM CHLORIDE: 600; 310; 30; 20 INJECTION, SOLUTION INTRAVENOUS at 09:20

## 2018-02-28 RX ADMIN — ONDANSETRON HYDROCHLORIDE 4 MG: 2 SOLUTION INTRAMUSCULAR; INTRAVENOUS at 09:44

## 2018-02-28 RX ADMIN — SCOPALAMINE 1 PATCH: 1 PATCH, EXTENDED RELEASE TRANSDERMAL at 09:33

## 2018-02-28 RX ADMIN — DEXAMETHASONE SODIUM PHOSPHATE 4 MG: 10 INJECTION, SOLUTION INTRAMUSCULAR; INTRAVENOUS at 09:57

## 2018-02-28 RX ADMIN — PANTOPRAZOLE SODIUM 40 MG: 40 TABLET, DELAYED RELEASE ORAL at 09:19

## 2018-02-28 RX ADMIN — PROPOFOL 200 MCG/KG/MIN: 10 INJECTION, EMULSION INTRAVENOUS at 09:44

## 2018-02-28 ASSESSMENT — COPD QUESTIONNAIRES: COPD: 0

## 2018-02-28 ASSESSMENT — ENCOUNTER SYMPTOMS
SEIZURES: 0
DYSRHYTHMIAS: 0
ORTHOPNEA: 0

## 2018-02-28 ASSESSMENT — ASTHMA QUESTIONNAIRES: ACT_TOTALSCORE: 25

## 2018-02-28 ASSESSMENT — LIFESTYLE VARIABLES: TOBACCO_USE: 0

## 2018-02-28 ASSESSMENT — PATIENT HEALTH QUESTIONNAIRE - PHQ9: SUM OF ALL RESPONSES TO PHQ QUESTIONS 1-9: 3

## 2018-02-28 NOTE — BRIEF OP NOTE
[facility] Brief Operative Note    Pre-operative diagnosis: mass right index finger   Post-operative diagnosis mass excision from index finger right finger   Procedure: Procedure(s):  Right Index Finger Mass Excision - Wound Class: I-Clean   Surgeon(s): Surgeon(s) and Role:     * Nate Allen DO - Primary   Estimated blood loss: 5 cc   Specimens:   ID Type Source Tests Collected by Time Destination   A : mass extensor tendon of right index finger Tissue Finger SURGICAL PATHOLOGY EXAM Nate Allen DO 2/28/2018 10:00 AM       Findings: See op note

## 2018-02-28 NOTE — IP AVS SNAPSHOT
MRN:[id number]                      After Visit Summary   2/28/2018    Ita Poole    MRN: [id number]           Thank you!     Thank you for choosing [facility] for your care. Our goal is always to provide you with excellent care. Hearing back from our patients is one way we can continue to improve our services. Please take a few minutes to complete the written survey that you may receive in the mail after you visit with us. Thank you!        Patient Information     Date Of Birth          1990        Designated Caregiver       Most Recent Value    Caregiver    Will someone help with your care after discharge? yes    Name of designated caregiver beb/so      About your hospital stay     You were admitted on:  February 28, 2018 You last received care in the:  [facility]    You were discharged on:  February 28, 2018       Who to Call     For medical emergencies, please call 911.  For non-urgent questions about your medical care, please call your primary care provider or clinic, [phone]  For questions related to your surgery, please call your surgery clinic        Attending Provider     Provider Specialty    Nate Allen, DO Orthopaedic Surgery       Primary Care Provider Office Phone # Fax #    Will WILLIS MD [place] [phone]      After Care Instructions      Diet as Tolerated       Return to diet before surgery, unless instructed otherwise.            Discharge Instructions       Review outpatient procedure discharge instructions with patient as directed by Provider            Discharge Instructions - Lifting Limit (specify)       Lifting limit  2 pounds until seen at Post-op follow up appointment.            Ice to affected area       Ice pack to surgical site every 15 minutes per hour for 24 hours            No Dressing Change       No dressing change until follow up appointment.            Notify Provider       For signs and symptoms of infection:  "Fever greater than 101, redness, swelling, heat at site, drainage, pus.            Return to clinic       Return to clinic in 2 weeks            Shower        Cover dressing if dressing is not going to be changed today            Wound care       Do not immerse wound in water until sutures removed                  Your next 10 appointments already scheduled     Mar 19, 2018  1:15 PM CDT   Return Visit with Nate Allen DO   [facility] ([facility])    [address]  [place] [address]   [phone]              Pending Results     No orders found from 2018 to 3/1/2018.            Admission Information     Date & Time Provider Department Dept. Phone    2018 Nate Allen,  [facility] [phone]      Your Vitals Were     Blood Pressure Pulse Temperature Respirations Last Period Pulse Oximetry    129/93 82 98.1  F (36.7  C) 13 2018 99%      MyChart Information     [email or web address] lets you send messages to your doctor, view your test results, renew your prescriptions, schedule appointments and more. To sign up, go to [email or web address] . Click on \"Log in\" on the left side of the screen, which will take you to the Welcome page. Then click on \"Sign up Now\" on the right side of the page.     You will be asked to enter the access code listed below, as well as some personal information. Please follow the directions to create your username and password.     Your access code is: [email or web address]  Expires: 2018 10:39 AM     Your access code will  in 90 days. If you need help or a new code, please call your [facility] clinic or [phone].        Care EveryWhere ID     This is your Care EveryWhere ID. This could be used by other organizations to access your [facility] medical records  [id number]        Equal Access to Services     GENE WILLSON AH: blue Rivera, kai haywood, " johnna mccoy hayaan ademichelle khmariselash la'aan ah. So [facility] [phone].    ATENCIÓN: Si bonniela saleem, tiene a alanis disposición servicios gratuitos de asistencia lingüística. Tor nixon [phone].    We comply with applicable federal civil rights laws and [place] laws. We do not discriminate on the basis of race, color, national origin, age, disability, sex, sexual orientation, or gender identity.               Review of your medicines      START taking        Dose / Directions    HYDROcodone-acetaminophen 5-325 MG per tablet   Commonly known as:  NORCO        Dose:  1-2 tablet   Take 1-2 tablets by mouth every 4 hours as needed for other (Moderate to Severe Pain)   Quantity:  5 tablet   Refills:  0         CONTINUE these medicines which have NOT CHANGED        Dose / Directions    albuterol 108 (90 BASE) MCG/ACT Inhaler   Commonly known as:  PROAIR HFA/PROVENTIL HFA/VENTOLIN HFA        Dose:  2 puff   Inhale 2 puffs into the lungs every 4 hours as needed for wheezing   Refills:  0       cyclobenzaprine 10 MG tablet   Commonly known as:  FLEXERIL        Dose:  10 mg   Take 10 mg by mouth 3 times daily as needed for muscle spasms   Refills:  0       EPI E-Z PEN LYNETTE 1:1000  IJ   Used for:  Swelling, mass, or lump in head and neck        1 TIME ONLY   Quantity:  1   Refills:  0       ibuprofen 800 MG tablet   Commonly known as:  ADVIL/MOTRIN        Dose:  800 mg   Take 800 mg by mouth 3 times daily as needed for pain   Refills:  0       risperiDONE 0.5 MG tablet   Commonly known as:  risperDAL        Take 1 tablet (0.5 mg) by mouth twice a day at bedtime and as needed   Refills:  0       sertraline 100 MG tablet   Commonly known as:  ZOLOFT        Dose:  150 mg   Take 150 mg by mouth daily   Refills:  0            Where to get your medicines      Some of these will need a paper prescription and others can be bought over the counter. Ask your nurse if you have questions.     Bring a paper prescription for each of these  medications     HYDROcodone-acetaminophen 5-325 MG per tablet                Protect others around you: Learn how to safely use, store and throw away your medicines at www.disposemymeds.org.        Information about OPIOIDS     PRESCRIPTION OPIOIDS: WHAT YOU NEED TO KNOW    Prescription opioids can be used to help relieve moderate to severe pain and are often prescribed following a surgery or injury, or for certain health conditions. These medications can be an important part of treatment but also come with serious risks. It is important to work with your health care provider to make sure you are getting the safest, most effective care.    WHAT ARE THE RISKS AND SIDE EFFECTS OF OPIOID USE?  Prescription opioids carry serious risks of addiction and overdose, especially with prolonged use. An opioid overdose, often marked by slowed breathing can cause sudden death. The use of prescription opioids can have a number of side effects as well, even when taken as directed:      Tolerance - meaning you might need to take more of a medication for the same pain relief    Physical dependence - meaning you have symptoms of withdrawal when a medication is stopped    Increased sensitivity to pain    Constipation    Nausea, vomiting, and dry mouth    Sleepiness and dizziness    Confusion    Depression    Low levels of testosterone that can result in lower sex drive, energy, and strength    Itching and sweating    RISKS ARE GREATER WITH:    History of drug misuse, substance use disorder, or overdose    Mental health conditions (such as depression or anxiety)    Sleep apnea    Older age (65 years or older)    Pregnancy    Avoid alcohol while taking prescription opioids.   Also, unless specifically advised by your health care provider, medications to avoid include:    Benzodiazepines (such as Xanax or Valium)    Muscle relaxants (such as Soma or Flexeril)    Hypnotics (such as Ambien or Lunesta)    Other prescription opioids    KNOW  YOUR OPTIONS:  Talk to your health care provider about ways to manage your pain that do not involve prescription opioids. Some of these options may actually work better and have fewer risks and side effects:    Pain relievers such as acetaminophen, ibuprofen, and naproxen    Some medications that are also used for depression or seizures    Physical therapy and exercise    Cognitive behavioral therapy, a psychological, goal-directed approach, in which patients learn how to modify physical, behavioral, and emotional triggers of pain and stress    IF YOU ARE PRESCRIBED OPIOIDS FOR PAIN:    Never take opioids in greater amounts or more often than prescribed    Follow up with your primary health care provider and work together to create a plan on how to manage your pain.    Talk about ways to help manage your pain that do not involve prescription opioids    Talk about all concerns and side effects    Help prevent misuse and abuse    Never sell or share prescription opioids    Never use another person's prescription opioids    Store prescription opioids in a secure place and out of reach of others (this may include visitors, children, friends, and family)    Visit www.cdc.gov/drugoverdose to learn about risks of opioid abuse and overdose    If you believe you may be struggling with addiction, tell your health care provider and ask for guidance or call [facility]'s National Helpline at 2-431-948-HELP    LEARN MORE / www.cdc.gov/drugoverdose/prescribing/guideline.html    Safely dispose of unused prescription opioids: Find your local drug take-back programs and more information about the importance of safe disposal at www.doseofreality.[place].gov             Medication List: This is a list of all your medications and when to take them. Check marks below indicate your daily home schedule. Keep this list as a reference.      Medications           Morning Afternoon Evening Bedtime As Needed    albuterol 108 (90 BASE) MCG/ACT Inhaler    Commonly known as:  PROAIR HFA/PROVENTIL HFA/VENTOLIN HFA   Inhale 2 puffs into the lungs every 4 hours as needed for wheezing                                cyclobenzaprine 10 MG tablet   Commonly known as:  FLEXERIL   Take 10 mg by mouth 3 times daily as needed for muscle spasms                                EPI E-Z PEN LYNETTE 1:1000  IJ   1 TIME ONLY                                HYDROcodone-acetaminophen 5-325 MG per tablet   Commonly known as:  NORCO   Take 1-2 tablets by mouth every 4 hours as needed for other (Moderate to Severe Pain)                                ibuprofen 800 MG tablet   Commonly known as:  ADVIL/MOTRIN   Take 800 mg by mouth 3 times daily as needed for pain                                risperiDONE 0.5 MG tablet   Commonly known as:  risperDAL   Take 1 tablet (0.5 mg) by mouth twice a day at bedtime and as needed                                sertraline 100 MG tablet   Commonly known as:  ZOLOFT   Take 150 mg by mouth daily

## 2018-02-28 NOTE — IP AVS SNAPSHOT
[facility]    [address]    [place] [address]    Phone:  [phone]    Fax:  [phone]                                       After Visit Summary   2/28/2018    Ita Poole    MRN: [id number]           After Visit Summary Signature Page     I have received my discharge instructions, and my questions have been answered. I have discussed any challenges I see with this plan with the nurse or doctor.    ..........................................................................................................................................  Patient/Patient Representative Signature      ..........................................................................................................................................  Patient Representative Print Name and Relationship to Patient    ..................................................               ................................................  Date                                            Time    ..........................................................................................................................................  Reviewed by Signature/Title    ...................................................              ..............................................  Date                                                            Time

## 2018-02-28 NOTE — H&P (VIEW-ONLY)
[facility] AND [facility]  [address]  [place] [address]  [phone]    PRE-OP EVALUATION:  Today's date: 2018    Ita Poole (: 1990) presents for pre-operative evaluation assessment as requested by Dr. Allen.  She requires evaluation and anesthesia risk assessment prior to undergoing surgery/procedure for treatment of Excise Mass Upper Extremity .    Proposed Surgery/ Procedure: Excision of mass  right index finger  Date of Surgery/ Procedure: 2018  Time of Surgery/ Procedure: 8:30 am  Hospital/Surgical Facility: [facility]    Primary Physician: Will Reynoso  Type of Anesthesia Anticipated: to be determined    Patient has a Health Care Directive or Living Will:  NO    1. NO - Do you have a history of heart attack, stroke, stent, bypass or surgery on an artery in the head, neck, heart or legs?  2. NO - Do you ever have any pain or discomfort in your chest?  3. NO - Do you have a history of  Heart Failure?  4. NO - Are you troubled by shortness of breath when: walking on the level, up a slight hill or at night?  5. NO - Do you currently have a cold, bronchitis or other respiratory infection?  6. NO - Do you have a cough, shortness of breath or wheezing?  7. NO - Do you sometimes get pains in the calves of your legs when you walk?  8. NO - Do you or anyone in your family have previous history of blood clots?  9. NO - Do you or does anyone in your family have a serious bleeding problem such as prolonged bleeding following surgeries or cuts?  10. NO - Have you ever had problems with anemia or been told to take iron pills?  11. NO - Have you had any abnormal blood loss such as black, tarry or bloody stools, or abnormal vaginal bleeding?  12. NO - Have you ever had a blood transfusion?  13. NO - Have you or any of your relatives ever had problems with anesthesia?  14. NO - Do you have sleep apnea, excessive snoring or daytime drowsiness?  15. NO - Do you have any  prosthetic heart valves?  16. NO - Do you have prosthetic joints?  17. NO - Is there any chance that you may be pregnant?      HPI:     HPI related to upcoming procedure: painful mass of her right index finger      See problem list for active medical problems.  Problems all longstanding and stable, except as noted/documented.  See ROS for pertinent symptoms related to these conditions.                                                                                                  .    MEDICAL HISTORY:     Patient Active Problem List    Diagnosis Date Noted     Mass of right index finger at the PIP joint 2018     Priority: Medium     Cold thyroid nodule 2017     Priority: Medium     Obesity 2017     Priority: Medium     Irregular menses 2016     Priority: Medium     H/O  section 2014     Priority: Medium     Major depressive disorder, recurrent episode, moderate (H) 2013     Priority: Medium     Overview:   Dx added per office visit on 12 with Dr. Burch.  Fátima Baltazar RN, Clinical , [facility], 2013 5:02 PM       Asthma 2008     Priority: Medium     Vitamin D deficiency 2007     Priority: Medium     Problem list name updated by automated process. Provider to review       Angioedema 2006     Priority: Medium     Problem list name updated by automated process. Provider to review       Celiac disease 2006     Priority: Medium     Abdominal pain, generalized 2006     Priority: Medium     C.diff, admitted for obs. X 24 hrs. Flagyl started as outpt. Laura Moffett MD         Depressive disorder, not elsewhere classified 10/28/2005     Priority: Medium     Followed at [facility], meds by Marnie Marsh RN       Moderate persistent asthma 2005     Priority: Medium     aap completed         Allergic rhinitis 2005     Priority: Medium     Problem list name updated by  automated process. Provider to review       Migraine 2005     Priority: Medium     Problem list name updated by automated process. Provider to review        Past Medical History:   Diagnosis Date     Allergic rhinitis     No Comments Provided     Celiac disease     No Comments Provided     Mass of right index finger at the PIP joint 2018     Uncomplicated asthma     No Comments Provided     Past Surgical History:   Procedure Laterality Date      SECTION      ,4/10/14, Section     COLONOSCOPY      age 10     ESOPHAGOSCOPY, GASTROSCOPY, DUODENOSCOPY (EGD), COMBINED      [id number],ESOPHAGOGASTRODUODENOSCOPY,age 10, small bowel bx: celiac disease     LAPAROSCOPIC CHOLECYSTECTOMY      9/22/15,Cholecystectomy,Laparoscopic     Current Outpatient Prescriptions   Medication Sig Dispense Refill     sertraline (ZOLOFT) 100 MG tablet Take 150 mg by mouth daily       cyclobenzaprine (FLEXERIL) 10 MG tablet Take 10 mg by mouth 3 times daily as needed for muscle spasms       risperiDONE (RISPERDAL) 0.5 MG tablet Take 1 tablet (0.5 mg) by mouth twice a day at bedtime and as needed       albuterol (PROAIR HFA/PROVENTIL HFA/VENTOLIN HFA) 108 (90 BASE) MCG/ACT Inhaler Inhale 2 puffs into the lungs every 4 hours as needed for wheezing       ibuprofen (ADVIL/MOTRIN) 800 MG tablet Take 800 mg by mouth 3 times daily as needed for pain       EPI E-Z PEN LYNETTE 1:1000  IJ 1 TIME ONLY 1 0     OTC products: None, except as noted above    Allergies   Allergen Reactions     Amoxicillin-Pot Clavulanate Hives     Augmentin Diarrhea     Yeast Infection in colon     Azithromycin Nausea     Azithromycin      Can't take because it contains wheat     Codeine      constipation     Gluten      Gluten Meal      Other reaction(s): Constipation  Celiac disease, abdominal pain      Latex Allergy: NO    Social History   Substance Use Topics     Smoking status: Never Smoker     Smokeless tobacco: Never Used      Comment: Quit smoking:  "passive exposure     Alcohol use No     History   Drug Use Not on file     Comment: Drug use: No       REVIEW OF SYSTEMS:   ROS is negativeexcept as noted above              EXAM:   /84 (BP Location: Right arm, Patient Position: Sitting, Cuff Size: Adult Large)  Pulse 96  Temp 98.1  F (36.7  C) (Tympanic)  Resp 20  Ht 5' 1.75\" (1.568 m)  Wt 226 lb 3.2 oz (102.6 kg)  LMP 02/02/2018  SpO2 96%  Breastfeeding? No  BMI 41.71 kg/m2  General Appearance: Pleasant, alert, appropriate appearance for age. No acute distress  Head Exam: Normal. Normocephalic, atraumatic.  Eye Exam: PERRLA, EOMI, conjunctiva, sclerae normal.  Ear Exam: Normal TM's bilaterally. Normal auditory canals and externalears. Non-tender.  Nose Exam: Normal external nose, mucus membranes, and septum.  OroPharynx Exam:  Dental hygiene adequate. Normal buccal mucose. Normal pharynx.  Neck Exam:  Supple, no masses or nodes. No bruits  Thyroid Exam: No nodules or enlargement.  Chest/Respiratory Exam: Normal chest wall and respirations. Clear to auscultation.  Cardiovascular Exam: Regular rate and rhythm. S1, S2, no murmur, click, gallop, or rubs.  Gastrointestinal Exam: Soft, non-tender, no masses or organomegaly.  Lymphatic Exam: Non-palpable nodes in neck,clavicular, axillary, or inguinal regions.  Musculoskeletal Exam: Mass of the IP joint of the right index finger likely due to ganglion cyst  Foot Exam: Left and right foot: good pedal pulses  Skin: no rash or abnormalities  Neurologic Exam:  normal gross motor, tone coordination and no tremor.  Psychiatric Exam: Alert and oriented - appropriate affect.     DIAGNOSTICS:     Labs Resulted Today:   Results for orders placed or performed in visit on 02/27/18   CBC with platelets differential   Result Value Ref Range    WBC 10.5 4.0 - 11.0 10e9/L    RBC Count 5.15 3.8 - 5.2 10e12/L    Hemoglobin 15.0 11.7 - 15.7 g/dL    Hematocrit 44.8 35.0 - 47.0 %    MCV 87 78 - 100 fl    MCH 29.1 26.5 - 33.0 " pg    MCHC 33.5 31.5 - 36.5 g/dL    RDW 12.2 10.0 - 15.0 %    Platelet Count 235 150 - 450 10e9/L    Diff Method Automated Method     % Neutrophils 63.0 %    % Lymphocytes 26.5 %    % Monocytes 8.0 %    % Eosinophils 1.1 %    % Basophils 0.7 %    % Immature Granulocytes 0.7 %    Absolute Neutrophil 6.6 1.6 - 8.3 10e9/L    Absolute Lymphocytes 2.8 0.8 - 5.3 10e9/L    Absolute Monocytes 0.8 0.0 - 1.3 10e9/L    Absolute Eosinophils 0.1 0.0 - 0.7 10e9/L    Absolute Basophils 0.1 0.0 - 0.2 10e9/L    Abs Immature Granulocytes 0.1 0 - 0.4 10e9/L   HCG Qual, Urine - CSC and Range ([id number])   Result Value Ref Range    HCG Qual Urine Negative NEG^Negative       Recent Labs   Lab Test  10/18/16   1832  10/18/16   1815  06/27/16   1556  06/27/16   1536   HGB   --   13.3   --   13.7   PLT   --   305   --   267   NA  138   --   134   --    POTASSIUM  3.8   --   3.7   --    CR  0.71   --   0.76   --       Results for orders placed or performed in visit on 02/27/18   CBC with platelets differential   Result Value Ref Range    WBC 10.5 4.0 - 11.0 10e9/L    RBC Count 5.15 3.8 - 5.2 10e12/L    Hemoglobin 15.0 11.7 - 15.7 g/dL    Hematocrit 44.8 35.0 - 47.0 %    MCV 87 78 - 100 fl    MCH 29.1 26.5 - 33.0 pg    MCHC 33.5 31.5 - 36.5 g/dL    RDW 12.2 10.0 - 15.0 %    Platelet Count 235 150 - 450 10e9/L    Diff Method Automated Method     % Neutrophils 63.0 %    % Lymphocytes 26.5 %    % Monocytes 8.0 %    % Eosinophils 1.1 %    % Basophils 0.7 %    % Immature Granulocytes 0.7 %    Absolute Neutrophil 6.6 1.6 - 8.3 10e9/L    Absolute Lymphocytes 2.8 0.8 - 5.3 10e9/L    Absolute Monocytes 0.8 0.0 - 1.3 10e9/L    Absolute Eosinophils 0.1 0.0 - 0.7 10e9/L    Absolute Basophils 0.1 0.0 - 0.2 10e9/L    Abs Immature Granulocytes 0.1 0 - 0.4 10e9/L   HCG Qual, Urine - CSC and Range ([id number])   Result Value Ref Range    HCG Qual Urine Negative NEG^Negative      IMPRESSION:   Reason for surgery/procedure: painful mass of finger  Diagnosis/reason  for consult: preop    The proposed surgical procedure is considered LOW risk.    REVISED CARDIAC RISK INDEX  The patient has the following serious cardiovascular risks for perioperative complications such as (MI, PE, VFib and 3  AV Block):  No serious cardiac risks  INTERPRETATION: 0 risks: Class I (very low risk - 0.4% complication rate)    The patient has the following additional risks for perioperative complications:  No identified additional risks      ICD-10-CM    1. Preop general physical exam Z01.818 CBC with platelets differential     HCG Qual, Urine - CSC and Range ([id number])   2. Mass of right index finger at the PIP joint R22.31    3. Celiac disease K90.0    4. Major depressive disorder, recurrent episode, moderate (H) F33.1    5. Class 2 obesity due to excess calories without serious comorbidity in adult, unspecified BMI E66.09        RECOMMENDATIONS:       --Patient is to take all scheduled medications on the day of surgery EXCEPT for modifications listed below.    APPROVAL GIVEN to proceed with proposed procedure, without further diagnostic evaluation       Signed Electronically by: Will Reynoso MD    Copy of this evaluation report is provided to requesting physician.    [facility] Preop Guidelines

## 2018-02-28 NOTE — ANESTHESIA POSTPROCEDURE EVALUATION
Patient: Ita Poole    Procedure(s):  Right Index Finger Mass Excision - Wound Class: I-Clean    Diagnosis:mass right index finger  Diagnosis Additional Information: No value filed.    Anesthesia Type:  LMA    Note:  Anesthesia Post Evaluation    Patient location during evaluation: Phase 2  Patient participation: Able to fully participate in evaluation  Level of consciousness: awake and alert  Pain management: adequate  Airway patency: patent  Cardiovascular status: acceptable  Respiratory status: acceptable  Hydration status: acceptable  PONV: none     Anesthetic complications: None          Last vitals:  Vitals:    02/28/18 1045 02/28/18 1100 02/28/18 1115   BP: 130/73 (!) 129/93 (!) 126/97   Pulse:      Resp:      Temp:   98.1  F (36.7  C)   SpO2: 99% 99% 97%         Electronically Signed By: Angela Shahid MD  February 28, 2018  11:48 AM

## 2018-02-28 NOTE — OP NOTE
Procedure Date: 02/28/2018      DATE OF PROCEDURE:  02/28/2018      PREOPERATIVE DIAGNOSIS:  Mass, dorsal aspect over the PIP joint, right index finger.      POSTOPERATIVE DIAGNOSIS:     1.  Mass on the extensor tendon of the right index finger.   2.  Partial tearing of the right extensor tendon to the index finger.      PROCEDURE:     1.  Excision of mass that measured 3x3 mm in size that was adhered onto the extensor tendon, right index finger (DOS 02/28/2018).   2.  Repair of extensor tendon, right index finger.      SPECIMENS:  One mass sent.      IMPLANTS:  None.      SURGEON:  Nate Allen DO      ASSISTANT:  None.      ANESTHESIA:  General via with LMA.      ESTIMATED BLOOD LOSS:  Less than 5 mL.      FLUIDS:  See chart.      DRAINS:  None.      COMPLICATIONS:  None.      DISPOSITION:  Stable to postop.      INDICATIONS:  The patient is a 27-year-old female who had been having ongoing complaints of pain of a painful mass over the right index finger.  She was having difficulties with flexion and extension of that finger and with that motion it caused increased discomfort as she tried to do other activities, and the mass seemed to be getting larger.  After discussing options with her she elected to go ahead with operative intervention.      PROCEDURE NOTE:  After informed consent was received from the patient having listed all the risks and benefits as noted on the consent form, but not listed on the consent form and having discussed all conservative, surgical and alternatives to treatment, patient signed a consent form with a witness present.  The patient understood there were numerous risks.  All of them were not written down, but were discussed at length.  No guarantees were given.  All questions were answered prior to signing consent.  The patient was given antibiotics 1/2 hour prior to skin incision.  She was seen back to the operating room supine on a gurney, transferred to the operating room table,  secured and all bony prominences were padded.  She was then given general anesthesia via laryngeal mask airway.  Once proper anesthesia was obtained, her right upper extremity was sterilely prepped and draped.      A timeout was performed according to institutional guidelines.  With this done, I then marked my incision site and then applied a Tourni-Cot tourniquet over the right index finger.  I then made my skin incision with a #15 scalpel.  Blunt dissection was performed around the mass itself.  It appeared to be more of a ganglion or synovial cyst, but it was adhered right into the extensor tendon and there was some fraying along that extensor tendon.  I then excised out the mass in its entirety and this was sent for pathological exam.  It measured approximately 3 x 3 mm in size.  I then copiously irrigated the area and maintained hemostasis.  I then repaired the extensor tendon with 4-0 Monocryl in interrupted fashion.  I released the Tourni-Cot, maintained hemostasis again, and irrigated.  I then closed the deep tissue with 4-0 Monocryl in interrupted fashion.  Then I closed the skin with 4-0 nylon in horizontal mattress fashion.  A metacarpal block with lidocaine and Marcaine mixture was injected.      Sterile dressings of Xeroform, 2 x 2 and Coban were placed and secured.  The patient was extubated and transferred back to [facility], taken to recovery room in satisfactory condition.      She will be discharged home per protocol.  Given a prescription for Norco 5/325, #5, no refills and she will follow up in my office as already arranged.  She does have a 2-pound weight restriction.  She is to leave the dressings on and not remove them.  If she needs to, we can do it for her in the office.  She will follow up in approximately 2 weeks and I will need pathology report as well as we will examine the incision site.  She knows to call the office if there are any problems.         JERRELL SANTANA,              D:  2018   T: 2018   MT: CORA      Name:     RENEE WILKS   MRN:      [id number]        Account:        [id number]   :      1990           Procedure Date: 2018      Document: [id number]

## 2018-02-28 NOTE — ANESTHESIA CARE TRANSFER NOTE
Patient: Ita Poole    Procedure(s):  Right Index Finger Mass Excision - Wound Class: I-Clean    Diagnosis: mass right index finger  Diagnosis Additional Information: No value filed.    Anesthesia Type:   LMA     Note:  Airway :LMA  Patient transferred to:PACU  Handoff Report: Identifed the Patient, Identified the Reponsible Provider, Reviewed the pertinent medical history, Discussed the surgical course, Reviewed Intra-OP anesthesia mangement and issues during anesthesia, Set expectations for post-procedure period and Allowed opportunity for questions and acknowledgement of understanding      Vitals: (Last set prior to Anesthesia Care Transfer)    CRNA VITALS  2/28/2018 0949 - 2/28/2018 1019      2/28/2018             EKG: NSR                Electronically Signed By: OLVIN SALDANA CRNA  February 28, 2018  10:19 AM

## 2018-02-28 NOTE — ANESTHESIA PREPROCEDURE EVALUATION
Anesthesia Evaluation     . Pt has had prior anesthetic. Type: Regional and General    History of anesthetic complications   - PONV and motion sickness  Has used scopolamine patch in the past with good results.      ROS/MED HX    ENT/Pulmonary:     (+)allergic rhinitis, Intermittent asthma Treatment: Inhaler prn,  , . .   (-) tobacco use, COPD, sleep apnea, RAISA risk factors, recent URI, cystic fibrosis, Other pulmonary disease, vocal abnormalities and other ENT disease   Neurologic:  - neg neurologic ROS    (-) seizures, CVA, TIA, Parkinson's disease, Multiple Sclerosis, Spinal cord injury, Developmental delay, Other neuro hx, Delerium, Dementia, Neuropathy and migraines   Cardiovascular:  - neg cardiovascular ROS   (+) ----. : . . . :. . No previous cardiac testing      (-) hypertension, CAD, taking anticoagulants/antiplatelets, CHF, SALCEDO, orthopnea/PND, syncope, pacemaker, arrhythmias, irregular heartbeat/palpitations, valvular problems/murmurs, congenital heart disease, pulmonary hypertension, dyslipidemia, PVD, stent, pacemaker and ICD   METS/Exercise Tolerance:  >4 METS   Hematologic:  - neg hematologic  ROS      (-) history of blood clots, anemia, other hematologic disorder and History of Transfusion   Musculoskeletal:  - neg musculoskeletal ROS      (-) muscular dystrophy, arthritis and fracture upper extremity   GI/Hepatic:     (+) Other GI/Hepatic Celiac disease.     (-) GERD, hiatal hernia, inflamatory bowel disease, bowel prep anticipated, appendicitis, cholecystitis/cholelithiasis, hepatitis and liver disease   Renal/Genitourinary:  - ROS Renal section negative    (-) renal disease, nephrolithiasis, BPH and other renal    Endo: Comment: BMI = 40.8.    (+) Obesity, .   (-) Type I DM, Type II DM, thyroid disease, chronic steroid usage and other endocrine disorder   Psychiatric:  - neg psychiatric ROS      (-) psychiatric history   Infectious Disease:  - neg infectious disease ROS      (-) Recent Fever,  SBE Prophylaxis, MRSA, VRE, HIV and TB   Malignancy:      - no malignancy   Other:    (+) No chance of pregnancy C-spine cleared: N/A, no H/O Chronic Pain,no other significant disability   - neg other ROS                 Physical Exam  Normal systems: cardiovascular and pulmonary    Airway   Mallampati: II  TM distance: <3 FB  Neck ROM: full    Dental   (+) other  Comment: Crowded teeth with discoloration.    Cardiovascular   Rhythm and rate: regular and normal      Pulmonary    breath sounds clear to auscultation                    Anesthesia Plan      History & Physical Review      ASA Status:  2 .    NPO Status:  > 8 hours    Plan for LMA with Intravenous and Propofol induction. Maintenance will be TIVA.    PONV prophylaxis:  Ondansetron (or other 5HT-3) and Dexamethasone or Solumedrol       Postoperative Care  Postoperative pain management:  Multi-modal analgesia.      Consents                          .

## 2018-03-06 ENCOUNTER — TELEPHONE (OUTPATIENT)
Dept: ORTHOPEDICS | Facility: OTHER | Age: 28
End: 2018-03-06

## 2018-03-06 NOTE — TELEPHONE ENCOUNTER
Patient had surgery last Wednesday she is wondering if the pain in her finger is normal, also wants to know is the mass was on the tendon? Also she wants to make sure she is not to change the bandage until she comes back on the 19th for post-op. Yue Langston LPN......................3/6/2018 11:53 AM

## 2018-03-12 ENCOUNTER — OFFICE VISIT (OUTPATIENT)
Dept: ORTHOPEDICS | Facility: OTHER | Age: 28
End: 2018-03-12
Attending: ORTHOPAEDIC SURGERY
Payer: COMMERCIAL

## 2018-03-12 DIAGNOSIS — Z98.890 HISTORY OF EXCISION OF MASS: Primary | ICD-10-CM

## 2018-03-12 PROCEDURE — 99024 POSTOP FOLLOW-UP VISIT: CPT

## 2018-03-12 NOTE — MR AVS SNAPSHOT
"              After Visit Summary   3/12/2018    Ita Poole    MRN: [id number]           Patient Information     Date Of Birth          1990        Visit Information        Provider Department      3/12/2018 1:00 PM [facility]        Today's Diagnoses     S/P excision of mass right index finger    -  1       Follow-ups after your visit        Your next 10 appointments already scheduled     Mar 12, 2018  1:00 PM CDT   Return Visit with [facility] ([facility])    [address]  [place] [address] [phone]            Mar 19, 2018  1:15 PM CDT   Return Visit with Nate Allen,    [facility] ([facility])    [address]  [place] [id number] [phone]              Who to contact     If you have questions or need follow up information about today's clinic visit or your schedule please contact [facility] directly at [phone].  Normal or non-critical lab and imaging results will be communicated to you by [email or web address], letter or phone within 4 business days after the clinic has received the results. If you do not hear from us within 7 days, please contact the clinic through HealOrt or phone. If you have a critical or abnormal lab result, we will notify you by phone as soon as possible.  Submit refill requests through ProteoSense or call your pharmacy and they will forward the refill request to us. Please allow 3 business days for your refill to be completed.          Additional Information About Your Visit        Bloom.comhart Information     ProteoSense lets you send messages to your doctor, view your test results, renew your prescriptions, schedule appointments and more. To sign up, go to [email or web address] . Click on \"Log in\" on the left side of the screen, which will take you to the Welcome page. Then click on \"Sign up " "Now\" on the right side of the page.     You will be asked to enter the access code listed below, as well as some personal information. Please follow the directions to create your username and password.     Your access code is: [email or web address]  Expires: 2018 11:39 AM     Your access code will  in 90 days. If you need help or a new code, please call your [facility] clinic or [phone].        Care EveryWhere ID     This is your Care EveryWhere ID. This could be used by other organizations to access your [facility] medical records  [id number]         Blood Pressure from Last 3 Encounters:   18 (!) 126/97   18 124/84   18 120/80    Weight from Last 3 Encounters:   18 102.6 kg (226 lb 3.2 oz)   18 104.3 kg (230 lb)   18 101.8 kg (224 lb 6.4 oz)              Today, you had the following     No orders found for display       Primary Care Provider Office Phone # Fax #    Will Reynoso [phone]       [address] [facility] [address]        Equal Access to Services     YONY WILLSON : Hadii rosa guo Someir, waaxda luqadaha, qaybta kaalmada ademichelleyada, johnna verde. So [facility] [phone].    ATENCIÓN: Si habla español, tiene a alanis disposición servicios gratuitos de asistencia lingüística. Llame al [phone].    We comply with applicable federal civil rights laws and [place] laws. We do not discriminate on the basis of race, color, national origin, age, disability, sex, sexual orientation, or gender identity.            Thank you!     Thank you for choosing [facility] AND [facility]  for your care. Our goal is always to provide you with excellent care. Hearing back from our patients is one way we can continue to improve our services. Please take a few minutes to complete the written survey that you may receive in the mail after your visit with us. Thank you!             Your Updated Medication List - " Protect others around you: Learn how to safely use, store and throw away your medicines at www.disposemymeds.org.          This list is accurate as of 3/12/18 12:00 PM.  Always use your most recent med list.                   Brand Name Dispense Instructions for use Diagnosis    albuterol 108 (90 BASE) MCG/ACT Inhaler    PROAIR HFA/PROVENTIL HFA/VENTOLIN HFA     Inhale 2 puffs into the lungs every 4 hours as needed for wheezing        cyclobenzaprine 10 MG tablet    FLEXERIL     Take 10 mg by mouth 3 times daily as needed for muscle spasms        EPI E-Z PEN LYNETTE 1:1000  IJ     1    1 TIME ONLY    Swelling, mass, or lump in head and neck       HYDROcodone-acetaminophen 5-325 MG per tablet    NORCO    5 tablet    Take 1-2 tablets by mouth every 4 hours as needed for other (Moderate to Severe Pain)    History of excision of mass       ibuprofen 800 MG tablet    ADVIL/MOTRIN     Take 800 mg by mouth 3 times daily as needed for pain        risperiDONE 0.5 MG tablet    risperDAL     Take 1 tablet (0.5 mg) by mouth twice a day at bedtime and as needed        sertraline 100 MG tablet    ZOLOFT     Take 150 mg by mouth daily

## 2018-03-12 NOTE — PROGRESS NOTES
Patient came in to have her dressing changed on her right index finger.  The dressing was intact, but getting dirty.  Dressing removed, finger cleaned with wound .  Suture site clean, dry, and intact.  Patient denied any pain.  Temp was 98.1.  New xeroform placed over suture site followed by a sterile 2x2, then wrapped with coban.  Patient stated that it was comfortable.  She does state that she understands that if it starts feeling too tight to loosen it, or if finger starts turning purple to loosen the bandage.  She will follow up as scheduled next week.  Abida Low LPN .......3/12/2018 11:53 AM

## 2018-03-19 ENCOUNTER — OFFICE VISIT (OUTPATIENT)
Dept: ORTHOPEDICS | Facility: OTHER | Age: 28
End: 2018-03-19
Attending: ORTHOPAEDIC SURGERY
Payer: COMMERCIAL

## 2018-03-19 VITALS
TEMPERATURE: 98.2 F | DIASTOLIC BLOOD PRESSURE: 78 MMHG | WEIGHT: 226 LBS | BODY MASS INDEX: 41.67 KG/M2 | SYSTOLIC BLOOD PRESSURE: 138 MMHG

## 2018-03-19 DIAGNOSIS — Z98.890 HISTORY OF EXCISION OF MASS: Primary | ICD-10-CM

## 2018-03-19 PROCEDURE — 99024 POSTOP FOLLOW-UP VISIT: CPT | Performed by: ORTHOPAEDIC SURGERY

## 2018-03-19 PROCEDURE — G0463 HOSPITAL OUTPT CLINIC VISIT: HCPCS

## 2018-03-19 ASSESSMENT — PAIN SCALES - GENERAL: PAINLEVEL: NO PAIN (0)

## 2018-03-19 NOTE — MR AVS SNAPSHOT
"              After Visit Summary   3/19/2018    Ita Poole    MRN: [id number]           Patient Information     Date Of Birth          1990        Visit Information        Provider Department      3/19/2018 1:15 PM Nate Allen DO [facility]        Today's Diagnoses     S/P excision of mass right index finger    -  1       Follow-ups after your visit        Follow-up notes from your care team     Return if symptoms worsen or fail to improve.      Who to contact     If you have questions or need follow up information about today's clinic visit or your schedule please contact [facility] AND [facility] directly at [phone].  Normal or non-critical lab and imaging results will be communicated to you by [email or web address], letter or phone within 4 business days after the clinic has received the results. If you do not hear from us within 7 days, please contact the clinic through [email or web address] or phone. If you have a critical or abnormal lab result, we will notify you by phone as soon as possible.  Submit refill requests through Aureliant or call your pharmacy and they will forward the refill request to us. Please allow 3 business days for your refill to be completed.          Additional Information About Your Visit        MyChart Information     Aureliant lets you send messages to your doctor, view your test results, renew your prescriptions, schedule appointments and more. To sign up, go to [email or web address] . Click on \"Log in\" on the left side of the screen, which will take you to the Welcome page. Then click on \"Sign up Now\" on the right side of the page.     You will be asked to enter the access code listed below, as well as some personal information. Please follow the directions to create your username and password.     Your access code is: [email or web address]  Expires: 2018 11:39 AM     Your access code will  in 90 days. If you need help or a new code, please call your [facility] or [phone].        Care EveryWhere ID     This is your Care EveryWhere ID. This could be used by other organizations to access your [facility] medical records  [id number]        Your Vitals Were     Temperature BMI (Body Mass Index)                98.2  F (36.8  C) (Tympanic) 41.67 kg/m2           Blood Pressure from Last 3 Encounters:   03/19/18 138/78   02/28/18 (!) 126/97   02/27/18 124/84    Weight from Last 3 Encounters:   03/19/18 102.5 kg (226 lb)   02/27/18 102.6 kg (226 lb 3.2 oz)   02/19/18 104.3 kg (230 lb)              Today, you had the following     No orders found for display       Primary Care Provider Office Phone # Fax #    Will ALBA MD Edgardo [phone]       [address] [facility] [address]        Equal Access to Services     [facility] : Hadii rosa guo Someir, waaxda luqadaha, qaybta kaalmada ademichelleyada, johnna sultana . So [facility] [phone].    ATENCIÓN: Si habla español, tiene a alanis disposición servicios gratuitos de asistencia lingüística. Llame al [phone].    We comply with applicable federal civil rights laws and [place] laws. We do not discriminate on the basis of race, color, national origin, age, disability, sex, sexual orientation, or gender identity.            Thank you!     Thank you for choosing [facility] AND [facility]  for your care. Our goal is always to provide you with excellent care. Hearing back from our patients is one way we can continue to improve our services. Please take a few minutes to complete the written survey that you may receive in the mail after your visit with us. Thank you!             Your Updated Medication List - Protect others around you: Learn how to safely use, store and throw away your medicines at www.disposemymeds.org.          This list is accurate as of 3/19/18  1:34 PM.  Always use your most recent med list.                   Brand Name Dispense Instructions for  use Diagnosis    albuterol 108 (90 BASE) MCG/ACT Inhaler    PROAIR HFA/PROVENTIL HFA/VENTOLIN HFA     Inhale 2 puffs into the lungs every 4 hours as needed for wheezing        cyclobenzaprine 10 MG tablet    FLEXERIL     Take 10 mg by mouth 3 times daily as needed for muscle spasms        EPI E-Z PEN LYNETTE 1:1000  IJ     1    1 TIME ONLY    Swelling, mass, or lump in head and neck       ibuprofen 800 MG tablet    ADVIL/MOTRIN     Take 800 mg by mouth 3 times daily as needed for pain        risperiDONE 0.5 MG tablet    risperDAL     Take 1 tablet (0.5 mg) by mouth twice a day at bedtime and as needed        sertraline 100 MG tablet    ZOLOFT     Take 150 mg by mouth daily

## 2018-03-19 NOTE — PROGRESS NOTES
PROGRESS NOTE    SUBJECTIVE:  Ita Poole is here for evaluation in regards to right index finger.  She is doing well with no complaints.    OBJECTIVE:  /78 (BP Location: Right arm, Patient Position: Sitting, Cuff Size: Adult Large)  Temp 98.2  F (36.8  C) (Tympanic)  Wt 102.5 kg (226 lb)  BMI 41.67 kg/m2 Body mass index is 41.67 kg/(m^2).    General Appearance: Pleasant 28 year old female in good appearance, mood and affect.  Alert and orientated times three ( time, date and location).    Incision:  Clean, dry, and intact.    Hand/fingers:  Movement: Stiffness which would be anticipated.  Sensation: Intact.  Radial and ulnar blood flow: Intact.    Wrist:  Motion: Full motion.    Elbow:  Motion: Full motion.    Shoulder:  Motion: Full motion.    Eyes: Pupils are round and she wears glasses.    Ears: Hearing: Intact.    Heart: Good capillary refill and her hands pulses are regular.    Lungs: Coarse breath sounds.    IMPRESSION:  POSTOPERATIVE DIAGNOSIS:     1.  Mass on the extensor tendon of the right index finger.   2.  Partial tearing of the right extensor tendon to the index finger.       PROCEDURE:     1.  Excision of mass that measured 3x3 mm in size that was adhered onto the extensor tendon, right index finger (DOS 02/28/2018).   2.  Repair of extensor tendon, right index finger.     Mass came back as a ganglion no signs of malignancy.  Excision of mass    PLAN:    Suture removal and wound care where outlined.  Work on ROM exercises for the fingers.  Follow up as needed.  All questions where answered.    Nate Allen D.O.  Orthopaedic Surgeon    [facility] [place] [address] [place] [address]  Phone [phone] (KNEE)  Fax [phone]    Disclaimer:  This note consists of words and symbols derived from keyboarding, dictation, or using voice recognition software. As a result, there may be errors in the script that have gone undetected. Please consider this when  interpreting information found in this note.    1:32 PM 3/19/2018

## 2018-03-19 NOTE — NURSING NOTE
Patient is here for her post op right index finger mass excision.  DOS: 2/28/18  Abida Low LPN .......3/19/2018 1:18 PM

## 2018-03-26 DIAGNOSIS — F32.89 ATYPICAL DEPRESSION: Primary | ICD-10-CM

## 2018-03-29 ENCOUNTER — TELEPHONE (OUTPATIENT)
Dept: FAMILY MEDICINE | Facility: OTHER | Age: 28
End: 2018-03-29

## 2018-03-29 NOTE — TELEPHONE ENCOUNTER
The patient would like her Dulera refilled. She has not had it for awhile and she got it before at [facility] and now that they are [facility] they have not record of it. I can not find a record of it in our new system either. She will wait until you get back. If she needs it before that she will go to the [facility].  Marilu Jacobs LPN..................3/29/2018   11:10 AM

## 2018-03-29 NOTE — TELEPHONE ENCOUNTER
Chart review shows that patient was seen for a preop with PCP on 2/27/18. Rx as requested was noted in office visit notes on that date, without changes. Writer is however unable to fill rx as requested due to overdue labs. PCP is out of the office until 4/3/18. As PCP saw patient for a preop and didn't order labwork, writer is unsure if labwork is needed. Writer will blayne up rx as requested at this time, will route to PCP's teamlet for his consideration/approval in the absence of PCP. Rx request does indicate that rx was last filled on 2/13/18 for a 30 day supply.     Unable to complete prescription refill per RN Medication Refill Policy. Augusto Barreto 3/29/2018 4:39 PM

## 2018-03-29 NOTE — TELEPHONE ENCOUNTER
Writer reviewed chart back to 9/15/16 office visit with PCP and was unable to see Dulera inhaler listed on patient's med list. Call placed to patient to discuss. Patient reports she had just spoken to SHANICE Michel and is not in need of speaking to this writer. States she and SHANICE Michel have refill request cared for. Writer will close this encounter at this time.    Augusto Barreto RN on 3/29/2018 at 11:17 AM

## 2018-03-30 ENCOUNTER — TELEPHONE (OUTPATIENT)
Dept: FAMILY MEDICINE | Facility: OTHER | Age: 28
End: 2018-03-30

## 2018-03-30 RX ORDER — RISPERIDONE 0.5 MG/1
TABLET ORAL
Qty: 60 TABLET | Refills: 10 | Status: SHIPPED | OUTPATIENT
Start: 2018-03-30 | End: 2018-07-11

## 2018-03-30 NOTE — TELEPHONE ENCOUNTER
Pharmacist Tiara called from [facility] with question regarding that recent refill on risperdal and it having 11 refills. Tiara states that patient is suppose to be seeing a psychiatrist and will use Dr Reynoso as a back up. She states that to keep the patient seeing her psychiatrist, Tiara would like to have just 1 fill of the medication and leave a message for the patient to see her psychiatrist for additional refills. Spoke with Krishna Luna MD and he agreed with this plan after hearing the patient's background and pharmacist was instructed.  Mindy Palacios LPN .............3/30/2018  4:02 PM

## 2018-06-08 ENCOUNTER — HEALTH MAINTENANCE LETTER (OUTPATIENT)
Age: 28
End: 2018-06-08

## 2018-07-20 ENCOUNTER — HOSPITAL ENCOUNTER (EMERGENCY)
Facility: OTHER | Age: 28
Discharge: HOME | End: 2018-07-20
Attending: EMERGENCY MEDICINE | Admitting: EMERGENCY MEDICINE
Payer: COMMERCIAL

## 2018-07-20 ENCOUNTER — APPOINTMENT (OUTPATIENT)
Dept: GENERAL RADIOLOGY | Facility: OTHER | Age: 28
End: 2018-07-20
Attending: EMERGENCY MEDICINE
Payer: COMMERCIAL

## 2018-07-20 VITALS
DIASTOLIC BLOOD PRESSURE: 74 MMHG | BODY MASS INDEX: 38.09 KG/M2 | RESPIRATION RATE: 18 BRPM | SYSTOLIC BLOOD PRESSURE: 124 MMHG | HEIGHT: 63 IN | OXYGEN SATURATION: 96 % | TEMPERATURE: 97.5 F | WEIGHT: 215 LBS

## 2018-07-20 DIAGNOSIS — S20.211A CONTUSION OF RIGHT CHEST WALL, INITIAL ENCOUNTER: ICD-10-CM

## 2018-07-20 PROCEDURE — 71101 X-RAY EXAM UNILAT RIBS/CHEST: CPT | Mod: RT

## 2018-07-20 PROCEDURE — 99283 EMERGENCY DEPT VISIT LOW MDM: CPT | Mod: 25 | Performed by: EMERGENCY MEDICINE

## 2018-07-20 PROCEDURE — 99283 EMERGENCY DEPT VISIT LOW MDM: CPT | Mod: Z6 | Performed by: EMERGENCY MEDICINE

## 2018-07-20 RX ORDER — HYDROCODONE BITARTRATE AND ACETAMINOPHEN 5; 325 MG/1; MG/1
1 TABLET ORAL EVERY 6 HOURS PRN
Qty: 10 TABLET | Refills: 0 | Status: SHIPPED | OUTPATIENT
Start: 2018-07-20 | End: 2018-09-30

## 2018-07-20 ASSESSMENT — ENCOUNTER SYMPTOMS
FEVER: 0
NAUSEA: 0
SHORTNESS OF BREATH: 0
CHEST TIGHTNESS: 0
ABDOMINAL PAIN: 0
CHILLS: 0
WOUND: 0
ARTHRALGIAS: 0
VOMITING: 0
DYSURIA: 0
LIGHT-HEADEDNESS: 0
AGITATION: 0

## 2018-07-20 NOTE — ED PROVIDER NOTES
History     Chief Complaint   Patient presents with     Rib Pain     The history is provided by the patient.     Ita Poole is a 28 year old female who fell in her bedroom last night. She has a corner bedpost that was sticking up and it struck her in the chest just below her right breast. She has been taking ibuprofen 800 for pain. She states she did not sleep well last night because of the pain. Any movement including deep breaths causes the pain to get worse. No abdominal pain. No shortness of breath.    Problem List:    Patient Active Problem List    Diagnosis Date Noted     S/P excision of mass right index finger 2018     Priority: Medium     Mass of right index finger at the PIP joint 2018     Priority: Medium     Cold thyroid nodule 2017     Priority: Medium     Obesity 2017     Priority: Medium     Irregular menses 2016     Priority: Medium     H/O  section 2014     Priority: Medium     Major depressive disorder, recurrent episode, moderate (H) 2013     Priority: Medium     Overview:   Dx added per office visit on 12 with Dr. Burch.  Fátima Baltazar RN, Clinical , [facility], 2013 5:02 PM       Asthma 2008     Priority: Medium     Vitamin D deficiency 2007     Priority: Medium     Problem list name updated by automated process. Provider to review       Angioedema 2006     Priority: Medium     Problem list name updated by automated process. Provider to review       Celiac disease 2006     Priority: Medium     Abdominal pain, generalized 2006     Priority: Medium     C.diff, admitted for obs. X 24 hrs. Flagyl started as outpt. Laura Moffett MD         Depressive disorder, not elsewhere classified 10/28/2005     Priority: Medium     Followed at [facility], meds by Marnie Marsh RN       Moderate persistent asthma 2005     Priority: Medium     aap completed          Allergic rhinitis 2005     Priority: Medium     Problem list name updated by automated process. Provider to review       Migraine 2005     Priority: Medium     Problem list name updated by automated process. Provider to review          Past Medical History:    Past Medical History:   Diagnosis Date     Allergic rhinitis      Celiac disease      Mass of right index finger at the PIP joint 2018     S/P excision of mass right index finger 2018     Uncomplicated asthma        Past Surgical History:    Past Surgical History:   Procedure Laterality Date      SECTION      ,4/10/14, Section     COLONOSCOPY      age 10     ESOPHAGOSCOPY, GASTROSCOPY, DUODENOSCOPY (EGD), COMBINED      [id number],ESOPHAGOGASTRODUODENOSCOPY,age 10, small bowel bx: celiac disease     EXCISE MASS UPPER EXTREMITY Right 2018    Procedure: EXCISE MASS UPPER EXTREMITY;  Right Index Finger Mass Excision;  Surgeon: Nate Allen DO;  Location: [facility] OR     LAPAROSCOPIC CHOLECYSTECTOMY      9/22/15,Cholecystectomy,Laparoscopic     S/P EXCISION OF MASS RIGHT INDEX FINGER Right 2018       Family History:    Family History   Problem Relation Age of Onset     Asthma Mother      Asthma     Allergy (Severe) Mother      Allergies     Asthma Father      Asthma     HEART DISEASE Father 43     Heart Disease, of MI     Diabetes Father      Diabetes     Asthma Maternal Grandmother      Asthma     Diabetes Paternal Grandfather      Diabetes       Social History:  Marital Status:  Single [1]  Social History   Substance Use Topics     Smoking status: Never Smoker     Smokeless tobacco: Never Used      Comment: Quit smoking: passive exposure     Alcohol use No        Medications:      cyclobenzaprine (FLEXERIL) 10 MG tablet   HYDROcodone-acetaminophen (NORCO) 5-325 MG per tablet   ibuprofen (ADVIL/MOTRIN) 800 MG tablet   sertraline (ZOLOFT) 100 MG tablet   albuterol (PROAIR HFA/PROVENTIL HFA/VENTOLIN HFA) 108  "(90 BASE) MCG/ACT Inhaler   EPI E-Z PEN LYNETTE 1:1000  IJ   risperiDONE (RISPERDAL) 0.5 MG tablet         Review of Systems   Constitutional: Negative for chills and fever.   HENT: Negative for congestion.    Eyes: Negative for visual disturbance.   Respiratory: Negative for chest tightness and shortness of breath.    Cardiovascular: Positive for chest pain.   Gastrointestinal: Negative for abdominal pain, nausea and vomiting.   Genitourinary: Negative for dysuria.   Musculoskeletal: Negative for arthralgias.   Skin: Negative for rash and wound.   Neurological: Negative for light-headedness.   Psychiatric/Behavioral: Negative for agitation.       Physical Exam   BP: 124/74  Heart Rate: 71  Temp: 97.5  F (36.4  C)  Resp: 18  Height: 160 cm (5' 3\")  Weight: 97.5 kg (215 lb)  SpO2: 96 %      Physical Exam   Constitutional: She is oriented to person, place, and time. She appears well-developed and well-nourished. No distress.   HENT:   Head: Normocephalic and atraumatic.   Eyes: Conjunctivae are normal.   Neck: Neck supple.   Cardiovascular: Normal rate, regular rhythm and normal heart sounds.    Pulmonary/Chest: Effort normal and breath sounds normal.   She is tender to palpation right lower chest wall just anterior to midaxillary line. No bony step-off, crepitus or bruising or erythema.   Abdominal: Soft. Bowel sounds are normal. She exhibits no distension. There is no tenderness.   Neurological: She is alert and oriented to person, place, and time.   Skin: Skin is warm and dry. She is not diaphoretic.   Psychiatric: She has a normal mood and affect. Her behavior is normal.   Nursing note and vitals reviewed.      ED Course     ED Course     Procedures           Results for orders placed or performed during the hospital encounter of 07/20/18 (from the past 24 hour(s))   XR Ribs & Chest Rt 3vw    Narrative    PROCEDURE:  XR RIBS & CHEST RT 3VW    HISTORY: fall; , .    COMPARISON:  None.    FINDINGS:  The " cardiomediastinal contours are unchanged.  The trachea is midline.  No focal consolidation, effusion or pneumothorax.    No acute right rib fracture is identified.      Impression    IMPRESSION:      No evidence of acute right rib fracture or pneumothorax.      KT LANDAVERDE MD       Medications - No data to display    Assessments & Plan (with Medical Decision Making)     I have reviewed the nursing notes.    I have reviewed the findings, diagnosis, plan and need for follow up with the patient.  X-ray is reassuring, no evidence of rib fracture. Which has a chest wall contusion, possible muscle strain there as well. She should continue ibuprofen 6-800 mg as needed every 6 hours. Also had Tylenol to this. She says she could not sleep last night and I did give her prescription for a few hydrocodone to help with sleep. Follow up in clinic if not improving.    New Prescriptions    HYDROCODONE-ACETAMINOPHEN (NORCO) 5-325 MG PER TABLET    Take 1 tablet by mouth every 6 hours as needed for severe pain       Final diagnoses:   Contusion of right chest wall, initial encounter       7/20/2018   [facility]     David Huber MD  07/20/18 [id number]

## 2018-07-20 NOTE — DISCHARGE INSTRUCTIONS
Chest Contusion    A contusion is a bruise to the skin, muscle, or ribs. It may cause pain, tenderness, and swelling. It may turn the skin purple until it heals. Contusions take a few days to a few weeks to heal.  Home care  Follow these guidelines when caring for yourself at home:    Rest. Don t do any heavy lifting or strenuous activity. Don t do any activity that causes pain.    Put an ice pack on the injured area. Do this for 20 minutes every 1 to 2 hours the first day. You can make an ice pack by wrapping a plastic bag of ice cubes in a thin towel. Continue to use the ice pack 3 to 4 times a day for the next 2 days. Then use the ice pack as needed to ease pain and swelling.    After 1 to 2 days you may put a warm compress on the area. Do this for 10 minutes several times a day. A warm compress is a clean cloth that s damp with warm water.    Hold a pillow to the affected area when you cough. This will help ease pain.    You may use over-the-counter pain medicine such as acetaminophen or ibuprofen to control pain, unless another pain medicine was prescribed. If you have chronic liver or kidney disease, talk with your healthcare provider before using these medicines. Also talk with your provider if you ve had a stomach ulcer or gastrointestinal bleeding.  Follow-up care  Follow up with your healthcare provider, or as advised.  When to seek medical advice  Call your healthcare provider right away if any of these occur:    New abdominal pain or abdominal pain that gets worse    Fever of 100.4 F (38 C) or higher, or as directed by your healthcare provider  Call 911  Call 911 if any of these occur:     Dizziness, weakness, or fainting    Shortness of breath, trouble breathing, or breathing fast    Chest pain gets worse when you breathe    Severe pain that comes on suddenly or lasts more than an hour  Date Last Reviewed: 5/1/2017 2000-2017 [employer]. [address], [place] [address]. All  rights reserved. This information is not intended as a substitute for professional medical care. Always follow your healthcare professional's instructions.

## 2018-07-20 NOTE — ED AVS SNAPSHOT
[facility]    [address]    [place] [address]    Phone:  [phone]    Fax:  [phone]                                       Ita oPole   MRN: [id number]    Department:  [facility]   Date of Visit:  7/20/2018           Patient Information     Date Of Birth          1990        Your diagnoses for this visit were:     Contusion of right chest wall, initial encounter        You were seen by David Huber MD.        Discharge Instructions         Chest Contusion    A contusion is a bruise to the skin, muscle, or ribs. It may cause pain, tenderness, and swelling. It may turn the skin purple until it heals. Contusions take a few days to a few weeks to heal.  Home care  Follow these guidelines when caring for yourself at home:    Rest. Don t do any heavy lifting or strenuous activity. Don t do any activity that causes pain.    Put an ice pack on the injured area. Do this for 20 minutes every 1 to 2 hours the first day. You can make an ice pack by wrapping a plastic bag of ice cubes in a thin towel. Continue to use the ice pack 3 to 4 times a day for the next 2 days. Then use the ice pack as needed to ease pain and swelling.    After 1 to 2 days you may put a warm compress on the area. Do this for 10 minutes several times a day. A warm compress is a clean cloth that s damp with warm water.    Hold a pillow to the affected area when you cough. This will help ease pain.    You may use over-the-counter pain medicine such as acetaminophen or ibuprofen to control pain, unless another pain medicine was prescribed. If you have chronic liver or kidney disease, talk with your healthcare provider before using these medicines. Also talk with your provider if you ve had a stomach ulcer or gastrointestinal bleeding.  Follow-up care  Follow up with your healthcare provider, or as advised.  When to seek medical advice  Call your healthcare provider right away if  any of these occur:    New abdominal pain or abdominal pain that gets worse    Fever of 100.4 F (38 C) or higher, or as directed by your healthcare provider  Call 911  Call 911 if any of these occur:     Dizziness, weakness, or fainting    Shortness of breath, trouble breathing, or breathing fast    Chest pain gets worse when you breathe    Severe pain that comes on suddenly or lasts more than an hour  Date Last Reviewed: 5/1/2017 2000-2017 [employer]. [address] [place] [address]. All rights reserved. This information is not intended as a substitute for professional medical care. Always follow your healthcare professional's instructions.          24 Hour Appointment Hotline     To schedule an appointment at [facility], please call [phone]. If you don't have a family doctor or clinic, we will help you find one. [facility] are conveniently located to serve the needs of you and your family.           Review of your medicines      START taking        Dose / Directions Last dose taken    HYDROcodone-acetaminophen 5-325 MG per tablet   Commonly known as:  NORCO   Dose:  1 tablet   Quantity:  10 tablet        Take 1 tablet by mouth every 6 hours as needed for severe pain   Refills:  0          Our records show that you are taking the medicines listed below. If these are incorrect, please call your family doctor or clinic.        Dose / Directions Last dose taken    albuterol 108 (90 Base) MCG/ACT Inhaler   Commonly known as:  PROAIR HFA/PROVENTIL HFA/VENTOLIN HFA   Dose:  2 puff        Inhale 2 puffs into the lungs every 4 hours as needed for wheezing   Refills:  0        cyclobenzaprine 10 MG tablet   Commonly known as:  FLEXERIL   Dose:  10 mg        Take 10 mg by mouth 3 times daily as needed for muscle spasms   Refills:  0        EPI E-Z PEN LYNETTE 1:1000  IJ   Quantity:  1        1 TIME ONLY   Refills:  0        ibuprofen 800 MG tablet   Commonly known as:  ADVIL/MOTRIN    Dose:  800 mg        Take 800 mg by mouth 3 times daily as needed for pain   Refills:  0        risperiDONE 0.5 MG tablet   Commonly known as:  risperDAL   Quantity:  60 tablet        TAKE 1 TABLET (0.5 MG) BY MOUTH TWICE A DAY AT BEDTIME AND AS NEEDED   Refills:  0        sertraline 100 MG tablet   Commonly known as:  ZOLOFT   Dose:  150 mg        Take 150 mg by mouth daily   Refills:  0                Information about OPIOIDS     PRESCRIPTION OPIOIDS: WHAT YOU NEED TO KNOW   We gave you an opioid (narcotic) pain medicine. It is important to manage your pain, but opioids are not always the best choice. You should first try all the other options your care team gave you. Take this medicine for as short a time (and as few doses) as possible.     These medicines have risks:    DO NOT drive when on new or higher doses of pain medicine. These medicines can affect your alertness and reaction times, and you could be arrested for driving under the influence (DUI). If you need to use opioids long-term, talk to your care team about driving.    DO NOT operate heave machinery    DO NOT do any other dangerous activities while taking these medicines.     DO NOT drink any alcohol while taking these medicines.      If the opioid prescribed includes acetaminophen, DO NOT take with any other medicines that contain acetaminophen. Read all labels carefully. Look for the word  acetaminophen  or  Tylenol.  Ask your pharmacist if you have questions or are unsure.    You can get addicted to pain medicines, especially if you have a history of addiction (chemical, alcohol or substance dependence). Talk to your care team about ways to reduce this risk.    Store your pills in a secure place, locked if possible. We will not replace any lost or stolen medicine. If you don t finish your medicine, please throw away (dispose) as directed by your pharmacist. The [place] [employer]ution [employer] has more information about safe disposal:  "https://www.pca.[facility].us/living-green/managing-unwanted-medications.     All opioids tend to cause constipation. Drink plenty of water and eat foods that have a lot of fiber, such as fruits, vegetables, prune juice, apple juice and high-fiber cereal. Take a laxative (Miralax, milk of magnesia, Colace, Senna) if you don t move your bowels at least every other day.         Prescriptions were sent or printed at these locations (1 Prescription)                   Other Prescriptions                Printed at Department/Unit printer (1 of 1)         HYDROcodone-acetaminophen (NORCO) 5-325 MG per tablet                Procedures and tests performed during your visit     XR Ribs & Chest Rt 3vw      Orders Needing Specimen Collection     None      Pending Results     No orders found from 7/18/2018 to 7/21/2018.            Pending Culture Results     No orders found from 7/18/2018 to 7/21/2018.            Pending Results Instructions     If you had any lab results that were not finalized at the time of your Discharge, you can call the ED Lab Result RN at [phone]. You will be contacted by this team for any positive Lab results or changes in treatment. The nurses are available 7 days a week from 10A to 6:30P.  You can leave a message 24 hours per day and they will return your call.        Thank you for choosing [facility]       Thank you for choosing [facility] for your care. Our goal is always to provide you with excellent care. Hearing back from our patients is one way we can continue to improve our services. Please take a few minutes to complete the written survey that you may receive in the mail after you visit with us. Thank you!        [employer] Information     [employer] lets you send messages to your doctor, view your test results, renew your prescriptions, schedule appointments and more. To sign up, go to [email or web address] . Click on \"Log in\" on the left side of the screen, which will take you to the Welcome " "page. Then click on \"Sign up Now\" on the right side of the page.     You will be asked to enter the access code listed below, as well as some personal information. Please follow the directions to create your username and password.     Your access code is: [email or web address]  Expires: 10/18/2018  3:49 PM     Your access code will  in 90 days. If you need help or a new code, please call your [facility] clinic or [phone].        Care EveryWhere ID     This is your Care EveryWhere ID. This could be used by other organizations to access your [facility] medical records  [id number]        Equal Access to Services     [facility] : Kathia Dsouza, blue meehan, kai haywood, johnna evrde. So [facility] [phone].    ATENCIÓN: Si habla español, tiene a alanis disposición servicios gratuitos de asistencia lingüística. Llame al [phone].    We comply with applicable federal civil rights laws and [place] laws. We do not discriminate on the basis of race, color, national origin, age, disability, sex, sexual orientation, or gender identity.            After Visit Summary       This is your record. Keep this with you and show to your community pharmacist(s) and doctor(s) at your next visit.                  "

## 2018-07-20 NOTE — ED TRIAGE NOTES
At approx. 1500 yesterday afternoon, patient tripped over her dog and hit her R ribs against bed post. She has been taking ibuprofen 800mg at home, but unable to control the pain. States she has intermittent sharp pains when taking deep breaths.     COLUMBIA-SUICIDE SEVERITY RATING SCALE   Screen with Triage Points for Emergency Department      Ask questions that are bolded and underlined.   Past  month   Ask Questions 1 and 2 YES NO   1)  Have you wished you were dead or wished you could go to sleep and not wake up?   x   2)  Have you actually had any thoughts of killing yourself?   x   If YES to 2, ask questions 3, 4, 5, and 6.  If NO to 2, go directly to question 6.   3)  Have you been thinking about how you might do this?   E.g.  I thought about taking an overdose but I never made a specific plan as to when where or how I would actually do it .and I would never go through with it.       4)  Have you had these thoughts and had some intention of acting on them?   As opposed to  I have the thoughts but I definitely will not do anything about them.       5)  Have you started to work out or worked out the details of how to kill yourself? Do you intend to carry out this plan?      6)  Have you ever done anything, started to do anything, or prepared to do anything to end your life?  Examples: Collected pills, obtained a gun, gave away valuables, wrote a will or suicide note, took out pills but didn t swallow any, held a gun but changed your mind or it was grabbed from your hand, went to the roof but didn t jump; or actually took pills, tried to shoot yourself, cut yourself, tried to hang yourself, etc.    If YES, ask: Was this within the past three months?  Lifetime     x    Past 3 Months        Item 1:  Behavioral Health Referral at Discharge  Item 2:  Behavioral Health Referral at Discharge   Item 3:  Behavioral Health Consult (Psychiatric Nurse/) and consider Patient Safety Precautions  Item 4:   Immediate Notification of Physician and/or Behavioral Health and Patient Safety Precautions   Item 5:  Immediate Notification of Physician and/or Behavioral Health and Patient Safety Precautions  Item 6:  Over 3 months ago: Behavioral Health Consult (Psychiatric Nurse/) and consider Patient Safety Precautions  OR  Item 6:  3 months ago or less: Immediate Notification of Physician and/or Behavioral Health and Patient Safety Precautions

## 2018-07-20 NOTE — ED AVS SNAPSHOT
[facility]    [address]    [place] [address]    Phone:  [phone]    Fax:  [phone]                                       Ita Poole   MRN: [id number]    Department:  [facility]   Date of Visit:  7/20/2018           After Visit Summary Signature Page     I have received my discharge instructions, and my questions have been answered. I have discussed any challenges I see with this plan with the nurse or doctor.    ..........................................................................................................................................  Patient/Patient Representative Signature      ..........................................................................................................................................  Patient Representative Print Name and Relationship to Patient    ..................................................               ................................................  Date                                            Time    ..........................................................................................................................................  Reviewed by Signature/Title    ...................................................              ..............................................  Date                                                            Time

## 2018-07-23 NOTE — PROGRESS NOTES
Patient Information     Patient Name  Ita Poole MRN  [id number] Sex  Female   1990      Letter by Lizzy Mckenzie NP at      Author:  Lizzy Mckenzie NP Service:  (none) Author Type:  (none)    Filed:   Encounter Date:  3/28/2017 Status:  (Other)           Ita Poole  [address]  [address] [facility] [address]          2016    To whom it may concern,     tIa Poole was seen today in the [facility] and missed Work due to illness. Patient may return to work when fever free for 24 hours.     Thank you,    Lizzy Mckenzie MSN, FNP  [facility]

## 2018-09-30 ENCOUNTER — OFFICE VISIT (OUTPATIENT)
Dept: FAMILY MEDICINE | Facility: OTHER | Age: 28
End: 2018-09-30
Payer: COMMERCIAL

## 2018-09-30 VITALS
HEIGHT: 64 IN | DIASTOLIC BLOOD PRESSURE: 78 MMHG | BODY MASS INDEX: 38.84 KG/M2 | WEIGHT: 227.5 LBS | HEART RATE: 91 BPM | OXYGEN SATURATION: 98 % | TEMPERATURE: 99 F | SYSTOLIC BLOOD PRESSURE: 116 MMHG

## 2018-09-30 DIAGNOSIS — B37.9 ANTIBIOTIC-INDUCED YEAST INFECTION: ICD-10-CM

## 2018-09-30 DIAGNOSIS — T36.95XA ANTIBIOTIC-INDUCED YEAST INFECTION: ICD-10-CM

## 2018-09-30 DIAGNOSIS — R09.81 SINUS CONGESTION: ICD-10-CM

## 2018-09-30 DIAGNOSIS — J06.9 UPPER RESPIRATORY TRACT INFECTION, UNSPECIFIED TYPE: Primary | ICD-10-CM

## 2018-09-30 PROBLEM — R22.31 MASS OF FINGER OF RIGHT HAND: Status: RESOLVED | Noted: 2018-02-19 | Resolved: 2018-09-30

## 2018-09-30 PROCEDURE — 99213 OFFICE O/P EST LOW 20 MIN: CPT | Performed by: NURSE PRACTITIONER

## 2018-09-30 PROCEDURE — G0463 HOSPITAL OUTPT CLINIC VISIT: HCPCS

## 2018-09-30 RX ORDER — FLUCONAZOLE 150 MG/1
150 TABLET ORAL ONCE
Qty: 1 TABLET | Refills: 0 | Status: SHIPPED | OUTPATIENT
Start: 2018-09-30 | End: 2018-09-30

## 2018-09-30 RX ORDER — CEFPROZIL 500 MG/1
500 TABLET, FILM COATED ORAL 2 TIMES DAILY
Qty: 14 TABLET | Refills: 0 | Status: SHIPPED | OUTPATIENT
Start: 2018-09-30 | End: 2019-04-04

## 2018-09-30 NOTE — NURSING NOTE
Patient presents to clinic today for a possible sinus infection. Patient is having a cough, runny and stuffy nose, wheezing. Patient is blowing out green mucous, getting sinus headache. Sx x 3 weeks. Over the counter is not helping.  Brook Prater CMA..............9/30/2018........4:43 PM    Medication Reconciliation: complete    Brook Prater CMA

## 2018-09-30 NOTE — PROGRESS NOTES
Nursing Notes:   Brook Prater CMA  9/30/2018  4:49 PM  Addendum  Patient presents to clinic today for a possible sinus infection. Patient is having a cough, runny and stuffy nose, wheezing. Patient is blowing out green mucous, getting sinus headache. Sx x 3 weeks. Over the counter is not helping.  Brook Prater CMA..............9/30/2018........4:43 PM    Medication Reconciliation: complete    Brook Prater CMA        SUBJECTIVE:   Ita Poole is a 28 year old female who presents to clinic today for the following health issues:    RESPIRATORY SYMPTOMS      Duration: 3 weeks, not improving    Description  nasal congestion, facial pain/pressure and cough    Severity: moderate    Accompanying signs and symptoms: no fevers, chills, Mild SOB at night, chest tightness. Runny stuffy nose.     History (predisposing factors):  asthma and exposure to smoke    Precipitating or alleviating factors: None    Therapies tried and outcome:  rest and fluids oral decongestant acetaminophen OTC NSAID guaifenesin      Problem list and histories reviewed & adjusted, as indicated.  Additional history: as documented    Current Outpatient Prescriptions   Medication Sig Dispense Refill     albuterol (PROAIR HFA/PROVENTIL HFA/VENTOLIN HFA) 108 (90 BASE) MCG/ACT Inhaler Inhale 2 puffs into the lungs every 4 hours as needed for wheezing       cefPROZIL (CEFZIL) 500 MG tablet Take 1 tablet (500 mg) by mouth 2 times daily for 7 days 14 tablet 0     cyclobenzaprine (FLEXERIL) 10 MG tablet Take 10 mg by mouth 3 times daily as needed for muscle spasms       EPI E-Z PEN LYNETTE 1:1000  IJ 1 TIME ONLY 1 0     fluconazole (DIFLUCAN) 150 MG tablet Take 1 tablet (150 mg) by mouth once for 1 dose 1 tablet 0     ibuprofen (ADVIL/MOTRIN) 800 MG tablet Take 800 mg by mouth 3 times daily as needed for pain       sertraline (ZOLOFT) 100 MG tablet Take 150 mg by mouth daily       Allergies   Allergen Reactions     Amoxicillin-Pot Clavulanate Hives      "Augmentin Diarrhea     Yeast Infection in colon     Azithromycin Nausea     Azithromycin      Can't take because it contains wheat     Codeine      constipation     Gluten      Gluten Meal      Other reaction(s): Constipation  Celiac disease, abdominal pain       ROS:  Notable findings in the HPI.       OBJECTIVE:     /78  Pulse 91  Temp 99  F (37.2  C) (Tympanic)  Ht 5' 3.5\" (1.613 m)  Wt 227 lb 8 oz (103.2 kg)  LMP 08/29/2018 (Exact Date)  SpO2 98%  Breastfeeding? No  BMI 39.67 kg/m2  Body mass index is 39.67 kg/(m^2).  GENERAL: healthy, alert and no distress  EYES: Eyes grossly normal to inspection  HENT: normal cephalic/atraumatic, right ear: normal: no effusions, no erythema, normal landmarks, left ear: normal: no effusions, no erythema, normal landmarks, nose and mouth without ulcers or lesions, rhinorrhea clear, oropharynx clear, oral mucous membranes moist and sinuses: maxillary tenderness on bilateral  NECK: no adenopathy  RESP: lungs clear to auscultation - no rales, rhonchi or wheezes and deep cough noted  CV: regular rates and rhythm, normal S1 S2, no S3 or S4 and no murmur, click or rub  SKIN: no suspicious lesions or rashes    Diagnostic Test Results:  none     ASSESSMENT/PLAN:     1. Upper respiratory tract infection, unspecified type  - cefPROZIL (CEFZIL) 500 MG tablet; Take 1 tablet (500 mg) by mouth 2 times daily for 7 days  Dispense: 14 tablet; Refill: 0    2. Sinus congestion  - cefPROZIL (CEFZIL) 500 MG tablet; Take 1 tablet (500 mg) by mouth 2 times daily for 7 days  Dispense: 14 tablet; Refill: 0    3. Antibiotic-induced yeast infection  - fluconazole (DIFLUCAN) 150 MG tablet; Take 1 tablet (150 mg) by mouth once for 1 dose  Dispense: 1 tablet; Refill: 0    Given length of illness will treat, however cautioned that this could still be viral. F/U if needed.     PLAN:    URI Adult:  Tylenol, Ibuprofen, Fluids, Rest, OTC cough suppressant/expectorant, OTC decongestant/antihistamine, " Saline gargles, Saline nasal spray, RX sinusitis  Cefzil and Vaporizer    Followup:    If not improving or if condition worsens, follow up with your Primary Care Provider    I explained my diagnostic considerations and recommendations to the patient, who voiced understanding and agreement with the treatment plan. All questions were answered. We discussed potential side effects of any prescribed or recommended therapies, as well as expectations for response to treatments. She was advised to contact our office if there is no improvement or worsening of conditions or symptoms.  If s/s worsen or persist, patient will either come back or follow up with PCP.    Disclaimer:  This note consists of words and symbols derived from keyboarding, dictation, or using voice recognition software. As a result, there may be errors in the script that have gone undetected. Please consider this when interpreting information found in this note.      Lizzy Mckenzie NP, 9/30/2018 4:58 PM

## 2018-09-30 NOTE — PATIENT INSTRUCTIONS
Antibiotic has been sent to pharmacy. Please take full course of antibiotic even if symptoms have completely resolved. This helps prevent against antibiotic resistance.     Patient prescribed antibiotics. Monitor for any fevers or chills. Return in 7-10 days if not feeling better. Please call clinic with any questions or concerns. Please take in a lot of fluids and get rest. Return with any change or worsening of symptoms.    May use symptomatic care with tylenol or ibuprofen. Sudafed or mucinex work well for congestion. May use cough syrup or cough drops.  Using a humidifier works well to break up the congestion. You can also sleep propped up on a couple pillows to decrease symptoms at night. A nettipot works well to decrease nasal congestion.    Use a Neti Pot/sinus flush (Tom Med Sinus Rinse) 3 times daily to irrigate sinuses/mucosal tissue.     Sudafed or mucinex work well for congestion.   If you choose pseudoephedrine, use for only 5-7 days AS DIRECTED. Speak to your pharmacist if you have any concerns about your medications. May also use decongestant nasal spray, but only for 3 days MAXIMUM.    You will need to be evaluated if you start to experience:  Fever higher than 102.5 F (39.2 C)   Sudden and severe pain in the face and head   Trouble seeing or seeing double   Trouble thinking clearly   Swelling or redness around 1 or both eyes   Trouble breathing or a stiff neck    * If you are a smoker, try to quit *    Call 9-1-1 or go to the emergency room if you:  Have trouble breathing   Are drooling because you cannot swallow your saliva   Have swelling of the neck or tongue   Cannot move your neck or have trouble opening your mouth

## 2018-09-30 NOTE — MR AVS SNAPSHOT
After Visit Summary   9/30/2018    Ita Poole    MRN: [id number]           Patient Information     Date Of Birth          1990        Visit Information        Provider Department      9/30/2018 4:45 PM Lizzy Mckenzie NP [facility]        Today's Diagnoses     Upper respiratory tract infection, unspecified type    -  1    Sinus congestion        Antibiotic-induced yeast infection          Care Instructions    Antibiotic has been sent to pharmacy. Please take full course of antibiotic even if symptoms have completely resolved. This helps prevent against antibiotic resistance.     Patient prescribed antibiotics. Monitor for any fevers or chills. Return in 7-10 days if not feeling better. Please call clinic with any questions or concerns. Please take in a lot of fluids and get rest. Return with any change or worsening of symptoms.    May use symptomatic care with tylenol or ibuprofen. Sudafed or mucinex work well for congestion. May use cough syrup or cough drops.  Using a humidifier works well to break up the congestion. You can also sleep propped up on a couple pillows to decrease symptoms at night. A nettipot works well to decrease nasal congestion.    Use a Neti Pot/sinus flush (Tom Med Sinus Rinse) 3 times daily to irrigate sinuses/mucosal tissue.     Sudafed or mucinex work well for congestion.   If you choose pseudoephedrine, use for only 5-7 days AS DIRECTED. Speak to your pharmacist if you have any concerns about your medications. May also use decongestant nasal spray, but only for 3 days MAXIMUM.    You will need to be evaluated if you start to experience:  Fever higher than 102.5 F (39.2 C)   Sudden and severe pain in the face and head   Trouble seeing or seeing double   Trouble thinking clearly   Swelling or redness around 1 or both eyes   Trouble breathing or a stiff neck    * If you are a smoker, try to quit *    Call 9-1-1 or go to the emergency room if  "you:  Have trouble breathing   Are drooling because you cannot swallow your saliva   Have swelling of the neck or tongue   Cannot move your neck or have trouble opening your mouth            Follow-ups after your visit        Who to contact     If you have questions or need follow up information about today's clinic visit or your schedule please contact [facility] directly at [phone].  Normal or non-critical lab and imaging results will be communicated to you by MyChart, letter or phone within 4 business days after the clinic has received the results. If you do not hear from us within 7 days, please contact the clinic through [employer] or phone. If you have a critical or abnormal lab result, we will notify you by phone as soon as possible.  Submit refill requests through [employer] or call your pharmacy and they will forward the refill request to us. Please allow 3 business days for your refill to be completed.          Additional Information About Your Visit        [employer] Information     [employer] lets you send messages to your doctor, view your test results, renew your prescriptions, schedule appointments and more. To sign up, go to [email or web address] . Click on \"Log in\" on the left side of the screen, which will take you to the Welcome page. Then click on \"Sign up Now\" on the right side of the page.     You will be asked to enter the access code listed below, as well as some personal information. Please follow the directions to create your username and password.     Your access code is: [email or web address]  Expires: 10/18/2018  3:49 PM     Your access code will  in 90 days. If you need help or a new code, please call your [place] clinic or [phone].        Care EveryWhere ID     This is your Care EveryWhere ID. This could be used by other organizations to access your [place] medical records  [id number]        Your Vitals Were     Pulse Temperature Height Last Period Pulse Oximetry " "Breastfeeding?    91 99  F (37.2  C) (Tympanic) 5' 3.5\" (1.613 m) 08/29/2018 (Exact Date) 98% No    BMI (Body Mass Index)                   39.67 kg/m2            Blood Pressure from Last 3 Encounters:   09/30/18 116/78   07/20/18 124/74   03/19/18 138/78    Weight from Last 3 Encounters:   09/30/18 227 lb 8 oz (103.2 kg)   07/20/18 215 lb (97.5 kg)   03/19/18 226 lb (102.5 kg)              Today, you had the following     No orders found for display         Today's Medication Changes          These changes are accurate as of 9/30/18  5:19 PM.  If you have any questions, ask your nurse or doctor.               Start taking these medicines.        Dose/Directions    cefPROZIL 500 MG tablet   Commonly known as:  CEFZIL   Used for:  Upper respiratory tract infection, unspecified type, Sinus congestion   Started by:  Lizzy Mckenzie NP        Dose:  500 mg   Take 1 tablet (500 mg) by mouth 2 times daily for 7 days   Quantity:  14 tablet   Refills:  0       fluconazole 150 MG tablet   Commonly known as:  DIFLUCAN   Used for:  Antibiotic-induced yeast infection   Started by:  Lizzy Mckenzie NP        Dose:  150 mg   Take 1 tablet (150 mg) by mouth once for 1 dose   Quantity:  1 tablet   Refills:  0            Where to get your medicines      These medications were sent to [facility] [address] [place] [address]     Phone:  [phone]     cefPROZIL 500 MG tablet    fluconazole 150 MG tablet                Primary Care Provider Office Phone # Fax #    Will WILLIS [phone]       [address] [facility] [address]        Equal Access to Services     YONY WILLSON AH: Kathia Dsouza, blue meehan, kai haywood, johnna verde. So [facility] [phone].    ATENCIÓN: Si habla español, tiene a alanis disposición servicios gratuitos de asistencia lingüística. Llame al " [phone].    We comply with applicable federal civil rights laws and [place] laws. We do not discriminate on the basis of race, color, national origin, age, disability, sex, sexual orientation, or gender identity.            Thank you!     Thank you for choosing [facility]  for your care. Our goal is always to provide you with excellent care. Hearing back from our patients is one way we can continue to improve our services. Please take a few minutes to complete the written survey that you may receive in the mail after your visit with us. Thank you!             Your Updated Medication List - Protect others around you: Learn how to safely use, store and throw away your medicines at www.disposemymeds.org.          This list is accurate as of 9/30/18  5:19 PM.  Always use your most recent med list.                   Brand Name Dispense Instructions for use Diagnosis    albuterol 108 (90 Base) MCG/ACT inhaler    PROAIR HFA/PROVENTIL HFA/VENTOLIN HFA     Inhale 2 puffs into the lungs every 4 hours as needed for wheezing        cefPROZIL 500 MG tablet    CEFZIL    14 tablet    Take 1 tablet (500 mg) by mouth 2 times daily for 7 days    Upper respiratory tract infection, unspecified type, Sinus congestion       cyclobenzaprine 10 MG tablet    FLEXERIL     Take 10 mg by mouth 3 times daily as needed for muscle spasms        EPI E-Z PEN LYNETTE 1:1000  IJ     1    1 TIME ONLY    Swelling, mass, or lump in head and neck       fluconazole 150 MG tablet    DIFLUCAN    1 tablet    Take 1 tablet (150 mg) by mouth once for 1 dose    Antibiotic-induced yeast infection       HYDROcodone-acetaminophen 5-325 MG per tablet    NORCO    10 tablet    Take 1 tablet by mouth every 6 hours as needed for severe pain        ibuprofen 800 MG tablet    ADVIL/MOTRIN     Take 800 mg by mouth 3 times daily as needed for pain        risperiDONE 0.5 MG tablet    risperDAL    60 tablet    TAKE 1 TABLET (0.5 MG) BY MOUTH TWICE A  DAY AT BEDTIME AND AS NEEDED    Atypical depression       sertraline 100 MG tablet    ZOLOFT     Take 150 mg by mouth daily

## 2018-11-19 ENCOUNTER — OFFICE VISIT (OUTPATIENT)
Dept: OBGYN | Facility: OTHER | Age: 28
End: 2018-11-19
Attending: OBSTETRICS & GYNECOLOGY
Payer: COMMERCIAL

## 2018-11-19 VITALS
TEMPERATURE: 98.9 F | DIASTOLIC BLOOD PRESSURE: 86 MMHG | WEIGHT: 222.8 LBS | HEART RATE: 102 BPM | BODY MASS INDEX: 38.85 KG/M2 | SYSTOLIC BLOOD PRESSURE: 132 MMHG | RESPIRATION RATE: 22 BRPM

## 2018-11-19 DIAGNOSIS — N92.6 IRREGULAR MENSES: ICD-10-CM

## 2018-11-19 DIAGNOSIS — N92.0 MENORRHAGIA WITH REGULAR CYCLE: ICD-10-CM

## 2018-11-19 DIAGNOSIS — Z12.4 CERVICAL CANCER SCREENING: Primary | ICD-10-CM

## 2018-11-19 LAB
B-HCG SERPL-ACNC: 1 IU/L
PROLACTIN SERPL-MCNC: 3.84 NG/ML
TSH SERPL DL<=0.05 MIU/L-ACNC: 2.78 IU/ML (ref 0.34–5.6)

## 2018-11-19 PROCEDURE — G0463 HOSPITAL OUTPT CLINIC VISIT: HCPCS

## 2018-11-19 PROCEDURE — 84146 ASSAY OF PROLACTIN: CPT | Performed by: OBSTETRICS & GYNECOLOGY

## 2018-11-19 PROCEDURE — 99214 OFFICE O/P EST MOD 30 MIN: CPT | Performed by: OBSTETRICS & GYNECOLOGY

## 2018-11-19 PROCEDURE — G0123 SCREEN CERV/VAG THIN LAYER: HCPCS

## 2018-11-19 PROCEDURE — 36415 COLL VENOUS BLD VENIPUNCTURE: CPT | Performed by: OBSTETRICS & GYNECOLOGY

## 2018-11-19 PROCEDURE — 87624 HPV HI-RISK TYP POOLED RSLT: CPT

## 2018-11-19 PROCEDURE — 84702 CHORIONIC GONADOTROPIN TEST: CPT | Performed by: OBSTETRICS & GYNECOLOGY

## 2018-11-19 PROCEDURE — 84443 ASSAY THYROID STIM HORMONE: CPT | Performed by: OBSTETRICS & GYNECOLOGY

## 2018-11-19 ASSESSMENT — PAIN SCALES - GENERAL: PAINLEVEL: NO PAIN (0)

## 2018-11-19 ASSESSMENT — PATIENT HEALTH QUESTIONNAIRE - PHQ9: SUM OF ALL RESPONSES TO PHQ QUESTIONS 1-9: 15

## 2018-11-19 NOTE — NURSING NOTE
Patient is here for concerns of heavy bleeding and possible endometriosis. States has family hx, symptoms are heavy periods, extreme cramps, very tired, increased clots, nausea, abdomen pain, irregular bm and occasional pelvic pain. States over past couple months has been increasing.   Kirti Andersen LPN .............11/19/2018     1:35 PM      Patient's last menstrual period was 11/02/2018 (exact date).  Medication Reconciliation: complete    Kirti Andersen LPN  11/19/2018 1:43 PM    
No

## 2018-11-19 NOTE — PROGRESS NOTES
CC: painful and heavy mentrual periods  HPI:  Ita is a 28 year old female who presents for evaluation of heavy and cramping menstrual cycles.  She has noted an increasingly heavy cycle requiring 1-2 boxes of tampons per menstrual cycle.  She notices an increase in pressure and cramping and shooting pain.  At times she has pain in her upper abdomen that stretches across the entire epigastric area and around her back.  She does note alternating diarrhea and constipation.  She does have a history of Crohn's disease and is on a gluten-free diet.  She notices that her stools are a little darker but does not feel that she has blood in the stools.  For relief she does use Flexeril and heat and at times smokes marijuana for pain relief.  She has been noting mood swings on and off and is taking Zoloft presently.  Her partner has had a vasectomy for birth control.  She has a surgical history of 2  sections.  She denies issues with her bladder presently.  She has been occasionally getting migraines and occasionally feels lightheaded and dizzy.  There is a family history of endometriosis and at least 2 of her aunts.  She has a remote history of a thyroid nodule which was biopsied and found to be benign.  She has had her gallbladder removed following being on oral contraceptives in the last 2 years.  She has also noted breast discharge from both breasts at times with a foul odor.  She was notable for having mastitis a few times during her postpartum period 3 or 4 years ago. Denies history of chlamydia or gonorrhea.  Patient's last menstrual period was 2018 (exact date).    Pap Smears: normal three years ago.  Mammograms: n/a    Obstetric History       T2      L2     SAB0   TAB0   Ectopic0   Multiple0   Live Births2       # Outcome Date GA Lbr Ilan/2nd Weight Sex Delivery Anes PTL Lv   2 Term 2014     CS-LTranv   SANTANA   1 Term 2012     CS-LTranv   SANTANA        Past Medical History:   Diagnosis Date      Allergic rhinitis     No Comments Provided     Celiac disease     No Comments Provided     Mass of right index finger at the PIP joint 2018     S/P excision of mass right index finger 2018     Uncomplicated asthma     No Comments Provided     Past Surgical History:   Procedure Laterality Date      SECTION      ,4/10/14, Section     COLONOSCOPY      age 10     ESOPHAGOSCOPY, GASTROSCOPY, DUODENOSCOPY (EGD), COMBINED      [id number],ESOPHAGOGASTRODUODENOSCOPY,age 10, small bowel bx: celiac disease     EXCISE MASS UPPER EXTREMITY Right 2018    Procedure: EXCISE MASS UPPER EXTREMITY;  Right Index Finger Mass Excision;  Surgeon: Nate Allen DO;  Location: [facility] OR     LAPAROSCOPIC CHOLECYSTECTOMY      9/22/15,Cholecystectomy,Laparoscopic     S/P EXCISION OF MASS RIGHT INDEX FINGER Right 2018     Social History     Social History     Marital status: Single     Spouse name: N/A     Number of children: N/A     Years of education: N/A     Occupational History     Not on file.     Social History Main Topics     Smoking status: Never Smoker     Smokeless tobacco: Never Used      Comment: Quit smoking: passive exposure     Alcohol use 0.0 oz/week      Comment: social      Drug use: Yes     Special: Marijuana      Comment: Drug use: No     Sexual activity: Not Currently     Other Topics Concern     Not on file     Social History Narrative     Family History   Problem Relation Age of Onset     Asthma Mother      Asthma     Allergy (Severe) Mother      Allergies     Asthma Father      Asthma     HEART DISEASE Father 43     Heart Disease, of MI     Diabetes Father      Diabetes     Asthma Maternal Grandmother      Asthma     Diabetes Paternal Grandfather      Diabetes       Current Outpatient Prescriptions   Medication     cyclobenzaprine (FLEXERIL) 10 MG tablet     EPI E-Z PEN LYNETTE 1:1000  IJ     ibuprofen (ADVIL/MOTRIN) 800 MG tablet     sertraline (ZOLOFT) 100 MG tablet      albuterol (PROAIR HFA/PROVENTIL HFA/VENTOLIN HFA) 108 (90 BASE) MCG/ACT Inhaler     No current facility-administered medications for this visit.      Allergies   Allergen Reactions     Amoxicillin-Pot Clavulanate Hives     Augmentin Diarrhea     Yeast Infection in colon     Azithromycin Nausea     Azithromycin      Can't take because it contains wheat     Codeine      constipation     Gluten      Gluten Meal      Other reaction(s): Constipation  Celiac disease, abdominal pain     /86 (BP Location: Right arm, Patient Position: Sitting, Cuff Size: Adult Large)  Pulse 102  Temp 98.9  F (37.2  C) (Tympanic)  Resp 22  Wt 101.1 kg (222 lb 12.8 oz)  LMP 11/02/2018 (Exact Date)  Breastfeeding? No  BMI 38.85 kg/m2    REVIEW OF SYSTEMS  Negative except as above.    Exam:  Constitutional: healthy, alert and no distress  Neck is supple without thyromegaly mass or nodule.  Breasts are pendulous and symmetric with diffuse fibroglandular change.  There is a bit of dried milk on the right nipple.  The left nipple I was not able to express milk.  She does have an accessory nipple inferior to the left breast on her abdominal wall.    Abdomen: Obese and nontender, nondistended, no organomegaly masses or hernias palpated.  Pelvic: Normal-appearing external genitalia, urethra anus vulva vagina and cervix appear normal.  Pap smear is obtained.  Bimanual exam was performed demonstrating upper limits of normal sized uterus which was nontender and mobile.  No adnexal masses were palpated.    ASSESSMENT/PLAN :  1. Cervical cancer screening    2. Irregular menses    3. Menorrhagia with regular cycle      (Z12.4) Cervical cancer screening  (primary encounter diagnosis)  Comment:   Plan: HPV High Risk Types DNA Cervical, Pap Screen         Thin Prep reflex to HPV if ASCUS - recommended         age 25 - 29 years, CANCELED: Pap Screen Thin         Prep            (N92.6) Irregular menses  Comment:   Plan: TSH, Prolactin, HCG  quantitative pregnancy            (N92.0) Menorrhagia with regular cycle  Comment:   Plan: US Pelvic Complete with Transvaginal          I will inform her of results when they are available and plan appropriate treatment depending on results.  I would consider putting her back on contraception, endometrial ablation, and possibly hysterectomy.  Endometriosis and adenomyosis would be leading diagnoses as primary causes for her symptoms of painful and heavier menstrual cycles.  The pain she experiences in her upper abdomen could be associated with endometriosis or her celiac disease.  I have encouraged her to continue use of fiber for her bowel symptoms.      TT:30 minutes with over half spent in discussion of management of heavy periods    Real Flower MD FACOG  1:55 PM 11/19/2018

## 2018-11-19 NOTE — MR AVS SNAPSHOT
After Visit Summary   11/19/2018    Ita Poole    MRN: [id number]           Patient Information     Date Of Birth          1990        Visit Information        Provider Department      11/19/2018 1:30 PM Real Flower MD [facility]        Today's Diagnoses     Cervical cancer screening    -  1    Irregular menses        Menorrhagia with regular cycle           Follow-ups after your visit        Your next 10 appointments already scheduled     Nov 23, 2018  3:00 PM CST   US PELVIC COMPLETE W TRANSVAGINAL with GHUS2   [facility]    [address]  [place] [address]   [phone]           How do I prepare for my exam? (Food and drink instructions) Adults: Drink four 8-ounce glasses of fluid. Finish drinking an hour before your exam, so you have a full bladder. If you need to empty your bladder before your exam, try to release only a little urine. Then, drink another glass of fluid.  Children: * Children who are potty trained up to 6 years old should drink at least 2 cups (16 oz) of water/non-carbonated beverage 30 minutes prior to the exam. * Children who are 6-10 years should drink at least 3 cups (24 oz) of water/non-carbonated beverage 45 minutes prior to the exam. * Children who are 10 years or older should drink at least 4 cups (32 oz) of water/non-carbonated beverage 45 minutes prior to the exam.  If your child is very uncomfortable or has an urgent need to pee, please notify a technologist; they will try to find out how much longer the wait may be and provide instructions to help relieve the pressure.  What should I wear: Wear comfortable clothes.  How long does the exam take: Most ultrasounds take 30 to 60 minutes.  What should I bring: Bring a list of your medicines, including vitamins, minerals and over-the-counter drugs. It is safest to leave personal items at home.  Do I need a :  No   is needed.  What do I need to tell my doctor: Tell your doctor about any allergies you may have.  What should I do after the exam: No restrictions, you may resume normal activities.  What is this test: An ultrasound uses sound waves to make pictures of the body. Sound waves do not cause pain. The only discomfort may be the pressure of the wand against your skin or full bladder.  Who should I call with questions: If you have any questions, please call the Imaging Department where you will have your exam. Directions, parking instructions, and other information are available on our website, [email or web address].              Future tests that were ordered for you today     Open Future Orders        Priority Expected Expires Ordered    US Pelvic Complete with Transvaginal Routine  11/19/2019 11/19/2018            Who to contact     If you have questions or need follow up information about today's clinic visit or your schedule please contact [facility] directly at [phone].  Normal or non-critical lab and imaging results will be communicated to you by [employer], letter or phone within 4 business days after the clinic has received the results. If you do not hear from us within 7 days, please contact the clinic through [employer] or phone. If you have a critical or abnormal lab result, we will notify you by phone as soon as possible.  Submit refill requests through [place] or call your pharmacy and they will forward the refill request to us. Please allow 3 business days for your refill to be completed.          Additional Information About Your Visit        [employer] Information     [place] gives you secure access to your electronic health record. If you see a primary care provider, you can also send messages to your care team and make appointments. If you have questions, please call your primary care clinic.  If you do not have a primary care provider, please call [phone] and they will  assist you.        Care EveryWhere ID     This is your Care EveryWhere ID. This could be used by other organizations to access your [place] medical records  [id number]        Your Vitals Were     Pulse Temperature Respirations Last Period Breastfeeding? BMI (Body Mass Index)    102 98.9  F (37.2  C) (Tympanic) 22 11/02/2018 (Exact Date) No 38.85 kg/m2       Blood Pressure from Last 3 Encounters:   11/19/18 132/86   09/30/18 116/78   07/20/18 124/74    Weight from Last 3 Encounters:   11/19/18 101.1 kg (222 lb 12.8 oz)   09/30/18 103.2 kg (227 lb 8 oz)   07/20/18 97.5 kg (215 lb)              We Performed the Following     HCG quantitative pregnancy     Pap Screen Thin Prep     Prolactin     TSH        Primary Care Provider Office Phone # Fax #    Will WILLIS [phone] [address] [facility] [address]        Equal Access to Services     [facility]: Hadii aad ku hadasho Soomaali, waaxda luqadaha, qaybta kaalmada adeegyada, waxay mateusin hayalen maria del carmen sultana . So [facility] [phone].    ATENCIÓN: Si habla español, tiene a alanis disposición servicios gratuitos de asistencia lingüística. Llame al [phone].    We comply with applicable federal civil rights laws and [place] laws. We do not discriminate on the basis of race, color, national origin, age, disability, sex, sexual orientation, or gender identity.            Thank you!     Thank you for choosing [facility]  for your care. Our goal is always to provide you with excellent care. Hearing back from our patients is one way we can continue to improve our services. Please take a few minutes to complete the written survey that you may receive in the mail after your visit with us. Thank you!             Your Updated Medication List - Protect others around you: Learn how to safely use, store and throw away your medicines at www.disposemymeds.org.          This list is accurate as of 11/19/18  2:41  PM.  Always use your most recent med list.                   Brand Name Dispense Instructions for use Diagnosis    albuterol 108 (90 Base) MCG/ACT inhaler    PROAIR HFA/PROVENTIL HFA/VENTOLIN HFA     Inhale 2 puffs into the lungs every 4 hours as needed for wheezing        cyclobenzaprine 10 MG tablet    FLEXERIL     Take 10 mg by mouth 3 times daily as needed for muscle spasms        EPI E-Z PEN LYNETTE 1:1000  IJ     1    1 TIME ONLY    Swelling, mass, or lump in head and neck       ibuprofen 800 MG tablet    ADVIL/MOTRIN     Take 800 mg by mouth 3 times daily as needed for pain        sertraline 100 MG tablet    ZOLOFT     Take 150 mg by mouth daily

## 2018-11-19 NOTE — LETTER
December 3, 2018      Ita Poole  [address]   [facility] [address]    Dear ,      I am happy to inform you that your recent cervical cancer screening test (PAP smear) was normal.      Preventative screenings such as this help to ensure your health for years to come. You should repeat a pap smear in 3 years, unless otherwise directed.      You will still need to return to the clinic every year for your annual exam and other preventive tests.     If you have additional questions regarding this result, please call our registered nurse, at [phone] [address].      Sincerely,      Real Flower MD

## 2018-11-23 ENCOUNTER — HOSPITAL ENCOUNTER (OUTPATIENT)
Dept: ULTRASOUND IMAGING | Facility: OTHER | Age: 28
Discharge: HOME | End: 2018-11-23
Attending: OBSTETRICS & GYNECOLOGY | Admitting: OBSTETRICS & GYNECOLOGY
Payer: COMMERCIAL

## 2018-11-23 DIAGNOSIS — N92.0 MENORRHAGIA WITH REGULAR CYCLE: ICD-10-CM

## 2018-11-23 PROCEDURE — 76830 TRANSVAGINAL US NON-OB: CPT

## 2018-11-26 ENCOUNTER — TELEPHONE (OUTPATIENT)
Dept: OBGYN | Facility: OTHER | Age: 28
End: 2018-11-26

## 2018-11-26 DIAGNOSIS — N92.1 MENORRHAGIA WITH IRREGULAR CYCLE: Primary | ICD-10-CM

## 2018-11-26 RX ORDER — MEDROXYPROGESTERONE ACETATE 150 MG/ML
150 INJECTION, SUSPENSION INTRAMUSCULAR
Qty: 3 ML | Refills: 3 | COMMUNITY
Start: 2018-11-26 | End: 2020-08-07

## 2018-11-26 NOTE — TELEPHONE ENCOUNTER
This RN telephoned pt.  Pt advised and instructed of Dr. ALFRED Flower's message regarding US report.  Pt states she has had the depo provera injection in the past and requests to begin this again.  Will route to MD for review and consideration.  Lidia Ng RN on 11/26/2018 at 9:19 AM

## 2018-11-29 ENCOUNTER — ALLIED HEALTH/NURSE VISIT (OUTPATIENT)
Dept: OBGYN | Facility: OTHER | Age: 28
End: 2018-11-29
Attending: OBSTETRICS & GYNECOLOGY
Payer: COMMERCIAL

## 2018-11-29 VITALS
BODY MASS INDEX: 38.64 KG/M2 | SYSTOLIC BLOOD PRESSURE: 130 MMHG | DIASTOLIC BLOOD PRESSURE: 82 MMHG | RESPIRATION RATE: 16 BRPM | WEIGHT: 221.6 LBS

## 2018-11-29 DIAGNOSIS — Z30.42 SURVEILLANCE FOR DEPO-PROVERA CONTRACEPTION: Primary | ICD-10-CM

## 2018-11-29 PROCEDURE — 25000128 H RX IP 250 OP 636: Performed by: OBSTETRICS & GYNECOLOGY

## 2018-11-29 PROCEDURE — 96372 THER/PROPH/DIAG INJ SC/IM: CPT

## 2018-11-29 RX ORDER — MEDROXYPROGESTERONE ACETATE 150 MG/ML
150 INJECTION, SUSPENSION INTRAMUSCULAR CONTINUOUS PRN
Status: DISCONTINUED | OUTPATIENT
Start: 2018-11-29 | End: 2020-08-07

## 2018-11-29 RX ADMIN — MEDROXYPROGESTERONE ACETATE 150 MG: 150 INJECTION, SUSPENSION INTRAMUSCULAR at 16:07

## 2018-11-29 ASSESSMENT — PAIN SCALES - GENERAL: PAINLEVEL: NO PAIN (0)

## 2018-11-29 NOTE — NURSING NOTE
Patientrequests ongoing injections of Depo-Provera  Date of last DMPA:    Adverse reaction to last shot?  No  Heavy bleeding or more than 14 days bleeding sincelast shot?  No  Wants to continue contraception for another 3 months, knowing that fertility may not return for up to 18 months?  Yes  Recent migraine headaches?  No  Breast problems since last shot?  No  Problems with excessive hair loss, acne or facial hair?  No    Johana Thompson ....................  11/29/2018   4:03 PM

## 2018-11-29 NOTE — MR AVS SNAPSHOT
After Visit Summary   11/29/2018    Ita Poole    MRN: [id number]           Patient Information     Date Of Birth          1990        Visit Information        Provider Department      11/29/2018 3:45 PM Nurse, Darrius [facility]        Today's Diagnoses     Surveillance for Depo-Provera contraception    -  1       Follow-ups after your visit        Who to contact     If you have questions or need follow up information about today's clinic visit or your schedule please contact [facility] directly at [phone].  Normal or non-critical lab and imaging results will be communicated to you by AFINOShart, letter or phone within 4 business days after the clinic has received the results. If you do not hear from us within 7 days, please contact the clinic through YieldMo or phone. If you have a critical or abnormal lab result, we will notify you by phone as soon as possible.  Submit refill requests through YieldMo or call your pharmacy and they will forward the refill request to us. Please allow 3 business days for your refill to be completed.          Additional Information About Your Visit        MyChart Information     [employer]Mo gives you secure access to your electronic health record. If you see a primary care provider, you can also send messages to your care team and make appointments. If you have questions, please call your primary care clinic.  If you do not have a primary care provider, please call [phone] and they will assist you.        Care EveryWhere ID     This is your Care EveryWhere ID. This could be used by other organizations to access your [facility] medical records  [id number]        Your Vitals Were     Respirations Last Period BMI (Body Mass Index)             16 11/02/2018 (Exact Date) 38.64 kg/m2          Blood Pressure from Last 3 Encounters:   11/29/18 130/82   11/19/18 132/86   09/30/18 116/78    Weight from Last 3 Encounters:    11/29/18 100.5 kg (221 lb 9.6 oz)   11/19/18 101.1 kg (222 lb 12.8 oz)   09/30/18 103.2 kg (227 lb 8 oz)              Today, you had the following     No orders found for display       Primary Care Provider Office Phone # Fax #    Will WILLIS [phone]       [address]   [facility] [address]        Equal Access to Services     [facility]: Hadii aad ku hadasho Soomaali, waaxda luqadaha, qaybta kaalmada adeegyada, waxay idiin hayaan adeeg zakimariselachi sultana . So [facility] [phone].    ATENCIÓN: Si rowdy monge, tiene a alanis disposición servicios gratuitos de asistencia lingüística. [facility] [phone].    We comply with applicable federal civil rights laws and [place] laws. We do not discriminate on the basis of race, color, national origin, age, disability, sex, sexual orientation, or gender identity.            Thank you!     Thank you for choosing [facility] AND [facility]  for your care. Our goal is always to provide you with excellent care. Hearing back from our patients is one way we can continue to improve our services. Please take a few minutes to complete the written survey that you may receive in the mail after your visit with us. Thank you!             Your Updated Medication List - Protect others around you: Learn how to safely use, store and throw away your medicines at www.disposemymeds.org.          This list is accurate as of 11/29/18  4:14 PM.  Always use your most recent med list.                   Brand Name Dispense Instructions for use Diagnosis    albuterol 108 (90 Base) MCG/ACT inhaler    PROAIR HFA/PROVENTIL HFA/VENTOLIN HFA     Inhale 2 puffs into the lungs every 4 hours as needed for wheezing        cyclobenzaprine 10 MG tablet    FLEXERIL     Take 10 mg by mouth 3 times daily as needed for muscle spasms        EPI E-Z PEN LYNETTE 1:1000  IJ     1    1 TIME ONLY    Swelling, mass, or lump in head and neck       ibuprofen 800 MG tablet    ADVIL/MOTRIN      Take 800 mg by mouth 3 times daily as needed for pain        medroxyPROGESTERone 150 MG/ML IM injection    DEPO-PROVERA    3 mL    Inject 1 mL (150 mg) into the muscle every 3 months    Menorrhagia with irregular cycle       sertraline 100 MG tablet    ZOLOFT     Take 150 mg by mouth daily

## 2019-01-17 ENCOUNTER — TELEPHONE (OUTPATIENT)
Dept: FAMILY MEDICINE | Facility: OTHER | Age: 29
End: 2019-01-17

## 2019-01-17 NOTE — TELEPHONE ENCOUNTER
Pt needs note stating she is being treated for depression and is taking zoloft.  Pt needs this for financial assistance at school.

## 2019-01-17 NOTE — LETTER
2019      RE:Ita Poole (:1990)  [address]   [facility] [id number]        To Whom It May Concern,     I am the primary physician for this patient who has major depressive disorder for which she takes Zoloft.    I hope this has been helpful. If you have questions, please contact our office.      Sincerely,        Will Reynoso MD

## 2019-01-18 NOTE — TELEPHONE ENCOUNTER
Patient notified and will  at unit 4 window.  SHANICE Quevedo........................1/18/2019  10:39 AM

## 2019-02-12 DIAGNOSIS — M62.838 MUSCLE SPASM: Primary | ICD-10-CM

## 2019-02-12 DIAGNOSIS — S30.0XXA CONTUSION, BUTTOCK, INITIAL ENCOUNTER: ICD-10-CM

## 2019-02-12 DIAGNOSIS — M79.18 RIGHT BUTTOCK PAIN: ICD-10-CM

## 2019-02-12 NOTE — LETTER
February 13, 2019      Ita Poole  [address] [place] [id number]        Dear Ita,       This is to remind you that you are due for your annual appointment with Will Reynoso. Your last visit was on 2/27/18. Additional refills of your medication require you to complete this visit.    Please call [phone] to schedule your appointment.    Thank you for choosing [facility] and [facility] for your health care needs.    Sincerely,      Refill RN  [facility]

## 2019-02-13 RX ORDER — IBUPROFEN 800 MG/1
TABLET, FILM COATED ORAL
Qty: 90 TABLET | Refills: 11 | Status: SHIPPED | OUTPATIENT
Start: 2019-02-13 | End: 2020-02-25

## 2019-02-13 RX ORDER — CYCLOBENZAPRINE HCL 10 MG
TABLET ORAL
Qty: 30 TABLET | Refills: 5 | Status: SHIPPED | OUTPATIENT
Start: 2019-02-13 | End: 2020-01-10

## 2019-02-13 NOTE — TELEPHONE ENCOUNTER
Chart review shows that Rxs as requested were last filled as noted below:    cyclobenzaprine (FLEXERIL) 10 mg tablet    Indications: Muscle spasm Take 1 tablet by mouth 3 times daily if needed for Muscle Spasm. 20 tablet   4 02/06/2018   Active     ibuprofen (ADVIL; MOTRIN) 800 mg tablet    Indications: Right buttock pain, Contusion, buttock, initial encounter Take 1 tablet by mouth 3 times daily if needed for Pain. 90 tablet   prn 02/06/2018   Active     Chart review shows that LOV with PCP was on 2/27/18 for a preop. Patient is due for an annual exam with PCP. Call placed to patient to discuss. Patient was not available at this time. Writer will blayne up and route Rx requests to PCP for his consideration/approval as well as will send patient a reminder letter. RN refill protocol fails.    Unable to complete prescription refill per RN Medication Refill Policy. Augusto Barreto 2/13/2019 2:50 PM

## 2019-02-18 DIAGNOSIS — F33.1 MAJOR DEPRESSIVE DISORDER, RECURRENT EPISODE, MODERATE (H): Primary | ICD-10-CM

## 2019-02-21 RX ORDER — SERTRALINE HYDROCHLORIDE 100 MG/1
TABLET, FILM COATED ORAL
Qty: 135 TABLET | Refills: 0 | Status: SHIPPED | OUTPATIENT
Start: 2019-02-21 | End: 2019-04-27

## 2019-02-21 NOTE — TELEPHONE ENCOUNTER
Chart review shows that patient is due for an annual exam with PCP and was sent reminder letter with last refill encounter dated 2/12/19. No appointment is noted to be scheduled at this time. Writer will refill Rx as requested for a limited supply at this time.    Prescription refilled per RN Medication Refill Policy..................Augusto Barreto 2/21/2019 8:29 AM

## 2019-02-26 ENCOUNTER — ALLIED HEALTH/NURSE VISIT (OUTPATIENT)
Dept: FAMILY MEDICINE | Facility: OTHER | Age: 29
End: 2019-02-26
Attending: FAMILY MEDICINE
Payer: COMMERCIAL

## 2019-02-26 DIAGNOSIS — Z30.42 ENCOUNTER FOR DEPO-PROVERA CONTRACEPTION: Primary | ICD-10-CM

## 2019-02-26 PROCEDURE — 25000128 H RX IP 250 OP 636: Performed by: OBSTETRICS & GYNECOLOGY

## 2019-02-26 PROCEDURE — 96372 THER/PROPH/DIAG INJ SC/IM: CPT

## 2019-02-26 PROCEDURE — 99207 ZZC NO CHARGE NURSE ONLY: CPT

## 2019-02-26 RX ADMIN — MEDROXYPROGESTERONE ACETATE 150 MG: 150 INJECTION, SUSPENSION INTRAMUSCULAR at 11:11

## 2019-02-26 NOTE — PROGRESS NOTES
"Verified patient's first and last name, . Patient stated reason for visit today is to receive Depo shot. BP checked prior to admin: 136/80. Depo injcetion prepared and administered as ordered.         Patientrequests ongoing injections of Depo-Provera  Date of last DMPA:  18  Adverse reaction to last shot? No    Heavy bleeding or more than 14 days bleeding sincelast shot?  No    Wants to continue contraception for another 3 months, knowing that fertility may not return for up to 18 months?  Yes.    Recent migraine headaches?  \"Not more then normal.\"    Breast problems since last shot? \"Very tender.\"    Problems with excessive hair loss, acne or facial hair?  No      Luann Zuniga RN on 2019 at 11:11 AM            "

## 2019-03-13 ENCOUNTER — OFFICE VISIT (OUTPATIENT)
Dept: FAMILY MEDICINE | Facility: OTHER | Age: 29
End: 2019-03-13
Attending: NURSE PRACTITIONER
Payer: COMMERCIAL

## 2019-03-13 VITALS
WEIGHT: 221.3 LBS | SYSTOLIC BLOOD PRESSURE: 158 MMHG | HEART RATE: 88 BPM | BODY MASS INDEX: 39.21 KG/M2 | TEMPERATURE: 99.2 F | DIASTOLIC BLOOD PRESSURE: 90 MMHG | HEIGHT: 63 IN | RESPIRATION RATE: 18 BRPM

## 2019-03-13 DIAGNOSIS — K08.89 PAIN, DENTAL: Primary | ICD-10-CM

## 2019-03-13 PROCEDURE — 99214 OFFICE O/P EST MOD 30 MIN: CPT | Performed by: PHYSICIAN ASSISTANT

## 2019-03-13 PROCEDURE — G0463 HOSPITAL OUTPT CLINIC VISIT: HCPCS

## 2019-03-13 RX ORDER — CLINDAMYCIN HCL 300 MG
300 CAPSULE ORAL 3 TIMES DAILY
Qty: 21 CAPSULE | Refills: 0 | Status: SHIPPED | OUTPATIENT
Start: 2019-03-13 | End: 2019-04-04

## 2019-03-13 RX ORDER — FLUCONAZOLE 150 MG/1
TABLET ORAL
Qty: 3 TABLET | Refills: 0 | Status: SHIPPED | OUTPATIENT
Start: 2019-03-13 | End: 2019-04-04

## 2019-03-13 ASSESSMENT — PAIN SCALES - GENERAL: PAINLEVEL: EXTREME PAIN (8)

## 2019-03-13 ASSESSMENT — MIFFLIN-ST. JEOR: SCORE: 1695

## 2019-03-13 NOTE — PROGRESS NOTES
HPI:    Ita Poole is a 29 year old female who presents to clinic today for upper left tooth pain. Onset 2 days ago, course is worsening  Associated symptoms: tooth pain - throbbing 8/10    Dental appointment tomorrow in [place]     Past Medical History:   Diagnosis Date     Allergic rhinitis     No Comments Provided     Celiac disease     No Comments Provided     Mass of right index finger at the PIP joint 2/19/2018     S/P excision of mass right index finger 2/28/2018     Uncomplicated asthma     No Comments Provided         Current Outpatient Medications   Medication Sig Dispense Refill     omeprazole (PRILOSEC) 20 MG DR capsule Take 1 capsule by mouth       albuterol (PROAIR HFA/PROVENTIL HFA/VENTOLIN HFA) 108 (90 BASE) MCG/ACT Inhaler Inhale 2 puffs into the lungs every 4 hours as needed for wheezing       cyclobenzaprine (FLEXERIL) 10 MG tablet TAKE 1 TABLET BY MOUTH 3 TIMES DAILY IF NEEDED FOR MUSCLE SPASM. 30 tablet 5     EPI E-Z PEN LYNETTE 1:1000  IJ 1 TIME ONLY 1 0     ibuprofen (ADVIL/MOTRIN) 800 MG tablet TAKE 1 TABLET BY MOUTH 3 TIMES DAILY IF NEEDED FOR PAIN. 90 tablet 11     medroxyPROGESTERone (DEPO-PROVERA) 150 MG/ML injection Inject 1 mL (150 mg) into the muscle every 3 months 3 mL 3     sertraline (ZOLOFT) 100 MG tablet TAKE 1.5 TABLETS BY MOUTH ONCE DAILY. 135 tablet 0       Allergies   Allergen Reactions     Amoxicillin-Pot Clavulanate Hives     Augmentin Diarrhea     Yeast Infection in colon     Azithromycin Nausea     Azithromycin      Can't take because it contains wheat     Codeine      constipation     Gluten      Gluten Meal      Other reaction(s): Constipation  Celiac disease, abdominal pain       ROS:  General: feels well, chills and sweats, no fever  HENT: tooth pain per HPI  Respiratory: negative    EXAM:  Vitals:    03/13/19 1513   BP: 158/90   BP Location: Left arm   Patient Position: Sitting   Cuff Size: Adult Regular   Pulse: 88   Resp: 18   Temp: 99.2  F (37.3  C)   TempSrc:  "Tympanic   Weight: 100.4 kg (221 lb 4.8 oz)   Height: 1.588 m (5' 2.5\")     General appearance: well appearing female in mild distress.   Dental: tooth #10 upper left is carious. Gum is normal  Psychological: normal affect, alert and pleasant    ASSESSMENT AND PLAN:    1. Pain, dental      RX per EPIC Start Clindamycin 300 mg 3 times daily for 7 days, Diflucan 150 mg oral tablet to prevent yeast infection.   Discussed symptomatic treatments per AVS  Follow up with dentist tomorrow  Follow up with PCP as needed  Patient received verbal and written instruction including review of warning signs    Clarissa Roper PA-C on 3/13/2019 at 6:22 PM        "

## 2019-03-13 NOTE — PATIENT INSTRUCTIONS
"Tooth pain.  Start Clindamycin 300 mg oral tablet, take one tablet 3 times daily x 7 days  Diflucan 150 mg oral tablet, take one tablet today, can repeat in 72 hours if needed.   Ibuprofen 600-800 mg every 6-8 hours, max 2400 mg/day. Take with food.  Tylenol 650-1000 mg every 4-6 hours, max 4000 mg/day  Apply ice to jaw 10-20 minutes every 1-2 hours as needed  Consult dentist to have follow up as soon as possible  Follow up with PCP as needed  Seek immediate care for    Your face becomes swollen or red    Pain worsens or spreads to the neck    Fever over 101  F (38.3  C)    Unusual drowsiness; headache or stiff neck; weakness or fainting    Pus drains from the tooth    Difficulty swallowing or breathing    Patient Education     Dental Pain    A crack or cavity in a tooth can cause tooth pain. This is because the crack or cavity exposes the sensitive inner area of the tooth. An infection in the gum or the root of the tooth can cause pain and swelling. The pain is often made worse when you drink hot or cold beverages. It can also be worse when you bite on hard foods. Pain may spread from the tooth to your ear or the area of the jaw on the same side.  Home care  Follow these tips when caring for yourself at home:    Don't have hot and cold foods and drinks. Your tooth may be sensitive to changes in temperature.    Use toothpaste made for sensitive teeth. Brush gently up and down instead of sideways. Brushing sideways can wear away root surfaces if they are exposed.    If your tooth is chipped or cracked, or if there is a large open cavity, put oil of cloves directly on the tooth to relieve pain. You can buy oil of cloves at drugstores. Some pharmacies carry an over-the-counter \"toothache kit.\" This contains a paste that you can put on the exposed tooth to make it less sensitive.    Put a cold pack on your jaw over the sore area to help reduce pain.    You may use over-the-counter medicine to ease pain, unless your " doctor prescribed another medicine. If you have chronic liver or kidney disease, talk with your healthcare provider before using acetaminophen or ibuprofen. Also talk with your provider if you ve had a stomach ulcer or GI bleeding.    If you have signs of an infection, you will be given an antibiotic. Take it as directed.  Follow-up care  Follow up with your dentist, or as advised. Your pain may go away with the treatment given today. But only a dentist can fully look at and treat the cause of your pain. This will keep the pain from coming back.  Call 911  Call 911 if any of these occur:    Unusual drowsiness    Headache or stiff neck    Weakness or fainting    Difficulty swallowing or breathing  When to seek medical advice  Call your health care provider right away if any of these occur:    Your face becomes swollen or red    Pain gets worse or spreads to your neck    Fever of 100.4  F (38.0  C) or higher, or as directed by your healthcare provider    Pus drains from the tooth  Date Last Reviewed: 10/1/2016    7950-9728 [employer]. [address] [place] [address], [place] [address]. All rights reserved. This information is not intended as a substitute for professional medical care. Always follow your healthcare professional's instructions.

## 2019-03-13 NOTE — NURSING NOTE
Patient presents to the clinic for upper left tooth pain that started a couple days ago. She was advised by her dentist to come in. She has taken ibuprofen 800mg and flexeril for relief.  Medication Reconciliation: complete    Mignon Noel, CMA

## 2019-04-04 ENCOUNTER — OFFICE VISIT (OUTPATIENT)
Dept: FAMILY MEDICINE | Facility: OTHER | Age: 29
End: 2019-04-04
Attending: NURSE PRACTITIONER
Payer: COMMERCIAL

## 2019-04-04 VITALS
TEMPERATURE: 98.4 F | RESPIRATION RATE: 16 BRPM | HEART RATE: 68 BPM | BODY MASS INDEX: 37.08 KG/M2 | DIASTOLIC BLOOD PRESSURE: 78 MMHG | SYSTOLIC BLOOD PRESSURE: 136 MMHG | WEIGHT: 217.2 LBS | HEIGHT: 64 IN

## 2019-04-04 DIAGNOSIS — L71.0 PERIORAL DERMATITIS: Primary | ICD-10-CM

## 2019-04-04 PROCEDURE — G0463 HOSPITAL OUTPT CLINIC VISIT: HCPCS

## 2019-04-04 PROCEDURE — 99213 OFFICE O/P EST LOW 20 MIN: CPT | Performed by: NURSE PRACTITIONER

## 2019-04-04 ASSESSMENT — MIFFLIN-ST. JEOR: SCORE: 1692.27

## 2019-04-04 ASSESSMENT — PAIN SCALES - GENERAL: PAINLEVEL: MILD PAIN (2)

## 2019-04-04 NOTE — NURSING NOTE
"Chief Complaint   Patient presents with     Derm Problem   Pt present to clinic today for a skin problem around her lips. She states it might be allergic to her clindamycin she was prescribed in March. She has had this issue since.    Initial /78 (BP Location: Right arm, Patient Position: Sitting, Cuff Size: Adult Large)   Pulse 68   Temp 98.4  F (36.9  C) (Tympanic)   Resp 16   Ht 1.613 m (5' 3.5\")   Wt 98.5 kg (217 lb 3.2 oz)   BMI 37.87 kg/m   Estimated body mass index is 37.87 kg/m  as calculated from the following:    Height as of this encounter: 1.613 m (5' 3.5\").    Weight as of this encounter: 98.5 kg (217 lb 3.2 oz).  Medication Reconciliation: complete    Flor Lindo LPN  "

## 2019-04-04 NOTE — PROGRESS NOTES
HPI:    Ita Poole is a 29 year old female  who presents to clinic today for lip chapping.    She started Clindamycin on 3/13/19 BID x 7 days for a dental infection and then had a root canal done.  She noted redness, flaking, burning, and itching around her lips about 3 days after starting the medication and the symptoms have persisted.  Denies any lip swelling, no numbness, tingling.  No throat swelling, no difficulty swallowing.  No tongue swelling.  No other new products or other new medications.  No previous skin reactions like this in the past.    Tried Vaseoline, chap stick, Neosporin without relief.           Past Medical History:   Diagnosis Date     Allergic rhinitis     No Comments Provided     Celiac disease     No Comments Provided     Mass of right index finger at the PIP joint 2018     S/P excision of mass right index finger 2018     Uncomplicated asthma     No Comments Provided     Past Surgical History:   Procedure Laterality Date      SECTION      ,4/10/14, Section     COLONOSCOPY      age 10     ESOPHAGOSCOPY, GASTROSCOPY, DUODENOSCOPY (EGD), COMBINED      [id number],ESOPHAGOGASTRODUODENOSCOPY,age 10, small bowel bx: celiac disease     EXCISE MASS UPPER EXTREMITY Right 2018    Procedure: EXCISE MASS UPPER EXTREMITY;  Right Index Finger Mass Excision;  Surgeon: Nate Allen DO;  Location: [facility] OR     LAPAROSCOPIC CHOLECYSTECTOMY      9/22/15,Cholecystectomy,Laparoscopic     S/P EXCISION OF MASS RIGHT INDEX FINGER Right 2018     Social History     Tobacco Use     Smoking status: Never Smoker     Smokeless tobacco: Never Used     Tobacco comment: Quit smoking: passive exposure   Substance Use Topics     Alcohol use: Yes     Alcohol/week: 0.0 oz     Comment: social      Current Outpatient Medications   Medication Sig Dispense Refill     albuterol (PROAIR HFA/PROVENTIL HFA/VENTOLIN HFA) 108 (90 BASE) MCG/ACT Inhaler Inhale 2 puffs into the lungs every 4 hours as  "needed for wheezing       cyclobenzaprine (FLEXERIL) 10 MG tablet TAKE 1 TABLET BY MOUTH 3 TIMES DAILY IF NEEDED FOR MUSCLE SPASM. 30 tablet 5     EPI E-Z PEN LYNETTE 1:1000  IJ 1 TIME ONLY 1 0     ibuprofen (ADVIL/MOTRIN) 800 MG tablet TAKE 1 TABLET BY MOUTH 3 TIMES DAILY IF NEEDED FOR PAIN. 90 tablet 11     medroxyPROGESTERone (DEPO-PROVERA) 150 MG/ML injection Inject 1 mL (150 mg) into the muscle every 3 months 3 mL 3     omeprazole (PRILOSEC) 20 MG DR capsule Take 1 capsule by mouth       sertraline (ZOLOFT) 100 MG tablet TAKE 1.5 TABLETS BY MOUTH ONCE DAILY. 135 tablet 0     Allergies   Allergen Reactions     Amoxicillin-Pot Clavulanate Hives     Augmentin Diarrhea     Yeast Infection in colon     Azithromycin Nausea     Azithromycin      Can't take because it contains wheat     Codeine      constipation     Gluten      Gluten Meal      Other reaction(s): Constipation  Celiac disease, abdominal pain         Past medical history, past surgical history, current medications and allergies reviewed and accurate to the best of my knowledge.        ROS:  Refer to HPI    /78 (BP Location: Right arm, Patient Position: Sitting, Cuff Size: Adult Large)   Pulse 68   Temp 98.4  F (36.9  C) (Tympanic)   Resp 16   Ht 1.613 m (5' 3.5\")   Wt 98.5 kg (217 lb 3.2 oz)   BMI 37.87 kg/m      EXAM:  General Appearance: Well appearing adult female, appropriate appearance for age. No acute distress  Eyes: conjunctivae normal without erythema or irritation, no drainage or crusting, no eyelid swelling, pupils equal   Orophayrnx: moist mucous membranes, posterior pharynx without erythema, tonsils without hypertrophy, no erythema, no exudates or petechiae, no post nasal drip seen, no oral lesions, no leukoplakia, no gum swelling or erythema, teeth intact, no trismus, voice clear, no lip swelling.    Nose: No drainage or congestion   Neck: supple without adenopathy  Respiratory: normal chest wall and respirations.  Normal effort.  " No cough appreciated.  Musculoskeletal:  Normal gait.  Equal movement of bilateral upper extremities.  Equal movement of bilateral lower extremities.    Dermatological: perioral area with mild erythematous base with dry flaky excoriated and fissured skin including corners of lips bilaterally, no lip swelling.    Psychological: normal affect, alert and pleasant          ASSESSMENT/PLAN:  1. Perioral dermatitis    - metroNIDAZOLE (METROCREAM) 0.75 % external cream; Apply topically daily  Dispense: 45 g; Refill: 0    Wash facial skin gently with mild soapy water daily, rinse well, pat dry or air dry.    Discussed resolution of rash may take 4 to 8 weeks.    Discussed warning signs/symptoms indicative of need to f/u    Follow up if symptoms persist or worsen or concerns

## 2019-04-04 NOTE — PATIENT INSTRUCTIONS
Wash facial skin gently with mild soapy water daily, rinse well, pat dry or air dry    Apply Metronidazole cream daily     IF cream if too expensive then may use over the counter Clotrimazole cream twice daily    Follow up if no improvement or worsening or concerns

## 2019-04-27 DIAGNOSIS — F32.89 ATYPICAL DEPRESSION: ICD-10-CM

## 2019-04-27 DIAGNOSIS — F33.1 MAJOR DEPRESSIVE DISORDER, RECURRENT EPISODE, MODERATE (H): ICD-10-CM

## 2019-05-01 RX ORDER — RISPERIDONE 0.5 MG/1
TABLET ORAL
Qty: 180 TABLET | Refills: 3 | Status: SHIPPED | OUTPATIENT
Start: 2019-05-01 | End: 2023-03-14 | Stop reason: DRUGHIGH

## 2019-05-01 RX ORDER — SERTRALINE HYDROCHLORIDE 100 MG/1
TABLET, FILM COATED ORAL
Qty: 135 TABLET | Refills: 3 | Status: SHIPPED | OUTPATIENT
Start: 2019-05-01 | End: 2020-05-27

## 2019-05-01 NOTE — TELEPHONE ENCOUNTER
Chart review shows that Rx for risperidone is not active on patient's med list as follows:    Outpatient Medication Detail      Disp Refills Start End CLOVIS   risperiDONE (RISPERDAL) 0.5 MG tablet (Discontinued) 60 tablet 0 7/13/2018 9/30/2018 No   Sig: TAKE 1 TABLET (0.5 MG) BY MOUTH TWICE A DAY AT BEDTIME AND AS NEEDED   Patient not taking: Reported on 9/30/2018        Sent to pharmacy as: risperiDONE (RISPERDAL) 0.5 MG tablet   Class: E-Prescribe   Notes to Pharmacy: PATIENT REQUESTS REFILLS   Reason for Discontinue: Therapy completed   Order: [id number]   E-Prescribing Status: Receipt confirmed by pharmacy (7/13/2018  3:49 PM CDT)   Printout Tracking     External Result Report   Pharmacy     [id number] ([facility] - [place], [place] - [address]   This Order Has Been Discontinued     Order Status Reason By On   Discontinued Therapy completed Lizzy Mckenzie NP 9/30/18 [id number]     Writer also notes that patient is due for an annual exam with PCP and RN refill protocol fails for both Rxs as requested. Call placed to patient to discuss. Patient reports that she is indeed looking for refills of both Rxs as requested. She is willing to see PCP in the office as well and was transferred to scheduling for an appointment. Writer will blayne up and route Rx requests to PCP for his consideration/approval.    Unable to complete prescription refill per RN Medication Refill Policy. Augusto Barreto 5/1/2019 8:43 AM

## 2019-05-08 ENCOUNTER — OFFICE VISIT (OUTPATIENT)
Dept: FAMILY MEDICINE | Facility: OTHER | Age: 29
End: 2019-05-08
Attending: FAMILY MEDICINE
Payer: COMMERCIAL

## 2019-05-08 VITALS
HEART RATE: 96 BPM | OXYGEN SATURATION: 98 % | DIASTOLIC BLOOD PRESSURE: 84 MMHG | RESPIRATION RATE: 16 BRPM | TEMPERATURE: 97.7 F | BODY MASS INDEX: 39.56 KG/M2 | HEIGHT: 62 IN | SYSTOLIC BLOOD PRESSURE: 124 MMHG | WEIGHT: 215 LBS

## 2019-05-08 DIAGNOSIS — Z00.00 VISIT FOR PREVENTIVE HEALTH EXAMINATION: Primary | ICD-10-CM

## 2019-05-08 DIAGNOSIS — Z13.0 SCREENING FOR DEFICIENCY ANEMIA: ICD-10-CM

## 2019-05-08 DIAGNOSIS — E04.1 THYROID NODULE: ICD-10-CM

## 2019-05-08 DIAGNOSIS — Z13.228 SCREENING FOR METABOLIC DISORDER: ICD-10-CM

## 2019-05-08 DIAGNOSIS — K13.0 CHEILITIS: ICD-10-CM

## 2019-05-08 LAB
ALBUMIN SERPL-MCNC: 4.4 G/DL (ref 3.5–5.7)
ALP SERPL-CCNC: 40 U/L (ref 34–104)
ALT SERPL W P-5'-P-CCNC: 12 U/L (ref 7–52)
ANION GAP SERPL CALCULATED.3IONS-SCNC: 5 MMOL/L (ref 3–14)
AST SERPL W P-5'-P-CCNC: 14 U/L (ref 13–39)
BASOPHILS # BLD AUTO: 0.1 10E9/L (ref 0–0.2)
BASOPHILS NFR BLD AUTO: 0.4 %
BILIRUB SERPL-MCNC: 0.5 MG/DL (ref 0.3–1)
BUN SERPL-MCNC: 11 MG/DL (ref 7–25)
CALCIUM SERPL-MCNC: 9.2 MG/DL (ref 8.6–10.3)
CHLORIDE SERPL-SCNC: 108 MMOL/L (ref 98–107)
CO2 SERPL-SCNC: 27 MMOL/L (ref 21–31)
CREAT SERPL-MCNC: 0.84 MG/DL (ref 0.6–1.2)
DIFFERENTIAL METHOD BLD: ABNORMAL
EOSINOPHIL # BLD AUTO: 0.2 10E9/L (ref 0–0.7)
EOSINOPHIL NFR BLD AUTO: 1.6 %
ERYTHROCYTE [DISTWIDTH] IN BLOOD BY AUTOMATED COUNT: 11.7 % (ref 10–15)
GFR SERPL CREATININE-BSD FRML MDRD: 81 ML/MIN/{1.73_M2}
GLUCOSE SERPL-MCNC: 98 MG/DL (ref 70–105)
HCT VFR BLD AUTO: 41.3 % (ref 35–47)
HGB BLD-MCNC: 13.8 G/DL (ref 11.7–15.7)
IMM GRANULOCYTES # BLD: 0.1 10E9/L (ref 0–0.4)
IMM GRANULOCYTES NFR BLD: 0.4 %
LYMPHOCYTES # BLD AUTO: 2.3 10E9/L (ref 0.8–5.3)
LYMPHOCYTES NFR BLD AUTO: 17.2 %
MCH RBC QN AUTO: 30.3 PG (ref 26.5–33)
MCHC RBC AUTO-ENTMCNC: 33.4 G/DL (ref 31.5–36.5)
MCV RBC AUTO: 91 FL (ref 78–100)
MONOCYTES # BLD AUTO: 0.9 10E9/L (ref 0–1.3)
MONOCYTES NFR BLD AUTO: 6.3 %
NEUTROPHILS # BLD AUTO: 10.1 10E9/L (ref 1.6–8.3)
NEUTROPHILS NFR BLD AUTO: 74.1 %
PLATELET # BLD AUTO: 263 10E9/L (ref 150–450)
POTASSIUM SERPL-SCNC: 3.7 MMOL/L (ref 3.5–5.1)
PROT SERPL-MCNC: 7.4 G/DL (ref 6.4–8.9)
RBC # BLD AUTO: 4.56 10E12/L (ref 3.8–5.2)
SODIUM SERPL-SCNC: 140 MMOL/L (ref 134–144)
T4 FREE SERPL-MCNC: 0.85 NG/DL (ref 0.6–1.6)
TSH SERPL DL<=0.05 MIU/L-ACNC: 2.44 IU/ML (ref 0.34–5.6)
WBC # BLD AUTO: 13.6 10E9/L (ref 4–11)

## 2019-05-08 PROCEDURE — 84439 ASSAY OF FREE THYROXINE: CPT | Mod: ZL | Performed by: FAMILY MEDICINE

## 2019-05-08 PROCEDURE — 99395 PREV VISIT EST AGE 18-39: CPT | Performed by: FAMILY MEDICINE

## 2019-05-08 PROCEDURE — 84443 ASSAY THYROID STIM HORMONE: CPT | Mod: ZL | Performed by: FAMILY MEDICINE

## 2019-05-08 PROCEDURE — 80053 COMPREHEN METABOLIC PANEL: CPT | Mod: ZL | Performed by: FAMILY MEDICINE

## 2019-05-08 PROCEDURE — 85025 COMPLETE CBC W/AUTO DIFF WBC: CPT | Mod: ZL | Performed by: FAMILY MEDICINE

## 2019-05-08 PROCEDURE — G0463 HOSPITAL OUTPT CLINIC VISIT: HCPCS

## 2019-05-08 PROCEDURE — 36415 COLL VENOUS BLD VENIPUNCTURE: CPT | Mod: ZL | Performed by: FAMILY MEDICINE

## 2019-05-08 RX ORDER — TRIAMCINOLONE ACETONIDE 1 MG/G
OINTMENT TOPICAL 2 TIMES DAILY
Qty: 15 G | Refills: 1 | Status: SHIPPED | OUTPATIENT
Start: 2019-05-08 | End: 2020-11-20

## 2019-05-08 RX ORDER — LANOLIN ALCOHOL/MO/W.PET/CERES
CREAM (GRAM) TOPICAL DAILY
COMMUNITY
End: 2020-01-09

## 2019-05-08 ASSESSMENT — PAIN SCALES - GENERAL: PAINLEVEL: NO PAIN (0)

## 2019-05-08 ASSESSMENT — MIFFLIN-ST. JEOR: SCORE: 1662.45

## 2019-05-08 ASSESSMENT — PATIENT HEALTH QUESTIONNAIRE - PHQ9: SUM OF ALL RESPONSES TO PHQ QUESTIONS 1-9: 6

## 2019-05-08 NOTE — PROGRESS NOTES
"Nursing Notes:   Marilu Jacobs LPN  5/8/2019 10:05 AM  Signed  Chief Complaint   Patient presents with     Physical       Initial /84   Pulse 96   Temp 97.7  F (36.5  C) (Temporal)   Resp 16   Ht 1.581 m (5' 2.25\")   Wt 97.5 kg (215 lb)   SpO2 98%   Breastfeeding? No   BMI 39.01 kg/m    Estimated body mass index is 39.01 kg/m  as calculated from the following:    Height as of this encounter: 1.581 m (5' 2.25\").    Weight as of this encounter: 97.5 kg (215 lb).  Medication Reconciliation: complete    Marilu Jacobs LPN    SUBJECTIVE:  Ita Poole  is a 29 year old female who comes in today for complete evaluation.  She continues on Depo-Provera for birth control.  She had a Pap smear and HPV in November with gynecology and also had pelvic ultrasound.  All those were negative.  She was evaluated for heavy periods.    She continues on sertraline 150 mg daily and Risperdal 0.5 mg twice daily and at bedtime as needed. She was seeing Michelle Case at [facility]. She is having some sweats during the day, hot flashes for the last 3 weeks.     She is working at the [employer] since last week.     PHQ-9 SCORE 2/27/2018 11/19/2018 5/8/2019   PHQ-9 Total Score 3 15 6     She is going to [facility] on line.  She is working on being a . She is taking the summer off.     Past Medical, Family, and Social History reviewed and updated as noted below.   ROS is negative except as noted above       Allergies   Allergen Reactions     Amoxicillin-Pot Clavulanate Hives     Augmentin Diarrhea     Yeast Infection in colon     Azithromycin Nausea     Azithromycin      Can't take because it contains wheat     Codeine      constipation     Gluten      Gluten Meal      Other reaction(s): Constipation  Celiac disease, abdominal pain   ,   Family History   Problem Relation Age of Onset     Asthma Mother         Asthma     Allergy (Severe) Mother         Allergies     Asthma Father         " Asthma     Heart Disease Father 43        Heart Disease, of MI     Diabetes Father         Diabetes     Asthma Maternal Grandmother         Asthma     Diabetes Paternal Grandfather         Diabetes   ,   Current Outpatient Medications   Medication     albuterol (PROAIR HFA/PROVENTIL HFA/VENTOLIN HFA) 108 (90 BASE) MCG/ACT Inhaler     cyanocobalamin (VITAMIN B-12) 1000 MCG tablet     cyclobenzaprine (FLEXERIL) 10 MG tablet     EPI E-Z PEN LYNETTE 1:1000  IJ     ibuprofen (ADVIL/MOTRIN) 800 MG tablet     medroxyPROGESTERone (DEPO-PROVERA) 150 MG/ML injection     metroNIDAZOLE (METROCREAM) 0.75 % external cream     omeprazole (PRILOSEC) 20 MG DR capsule     risperiDONE (RISPERDAL) 0.5 MG tablet     sertraline (ZOLOFT) 100 MG tablet     triamcinolone (KENALOG) 0.1 % external ointment     Current Facility-Administered Medications   Medication     medroxyPROGESTERone (DEPO-PROVERA) injection 150 mg   ,   Past Medical History:   Diagnosis Date     Allergic rhinitis     No Comments Provided     Celiac disease     No Comments Provided     Mass of right index finger at the PIP joint 2018     S/P excision of mass right index finger 2018     Uncomplicated asthma     No Comments Provided   ,   Patient Active Problem List    Diagnosis Date Noted     S/P excision of mass right index finger 2018     Priority: Medium     Cold thyroid nodule 2017     Priority: Medium     Obesity 2017     Priority: Medium     Irregular menses 2016     Priority: Medium     H/O  section 2014     Priority: Medium     Major depressive disorder, recurrent episode, moderate (H) 2013     Priority: Medium     Overview:   Dx added per office visit on 12 with Dr. Minnie Baltazar RN, Clinical , [facility], 2013 5:02 PM       Vitamin D deficiency 2007     Priority: Medium     Problem list name updated by automated process. Provider to  "review       Angioedema 2006     Priority: Medium     Problem list name updated by automated process. Provider to review       Celiac disease 2006     Priority: Medium     Abdominal pain, generalized 2006     Priority: Medium     C.diff, admitted for obs. X 24 hrs. Flagyl started as outpt. Laura Moffett MD         Depressive disorder, not elsewhere classified 10/28/2005     Priority: Medium     Followed at [facility], meds by Marnie Marsh RN       Moderate persistent asthma 2005     Priority: Medium     aap completed         Allergic rhinitis 2005     Priority: Medium     Problem list name updated by automated process. Provider to review       Migraine 2005     Priority: Medium     Problem list name updated by automated process. Provider to review     ,   Past Surgical History:   Procedure Laterality Date      SECTION      ,4/10/14, Section     COLONOSCOPY      age 10     ESOPHAGOSCOPY, GASTROSCOPY, DUODENOSCOPY (EGD), COMBINED      [id number],ESOPHAGOGASTRODUODENOSCOPY,age 10, small bowel bx: celiac disease     EXCISE MASS UPPER EXTREMITY Right 2018    Procedure: EXCISE MASS UPPER EXTREMITY;  Right Index Finger Mass Excision;  Surgeon: Nate Allen DO;  Location: [facility] OR     LAPAROSCOPIC CHOLECYSTECTOMY      9/22/15,Cholecystectomy,Laparoscopic     S/P EXCISION OF MASS RIGHT INDEX FINGER Right 2018    and   Social History     Tobacco Use     Smoking status: Never Smoker     Smokeless tobacco: Never Used     Tobacco comment: Quit smoking: passive exposure   Substance Use Topics     Alcohol use: Yes     Alcohol/week: 0.0 oz     Comment: social      OBJECTIVE:  /84   Pulse 96   Temp 97.7  F (36.5  C) (Temporal)   Resp 16   Ht 1.581 m (5' 2.25\")   Wt 97.5 kg (215 lb)   SpO2 98%   Breastfeeding? No   BMI 39.01 kg/m     EXAM:  General Appearance: Pleasant, alert, appropriate appearance for age. No acute distress  Head Exam: Normal. " Normocephalic, atraumatic.  Eye Exam: PERRLA, EOMI, conjunctivae, sclerae normal.  Ear Exam: Normal TM's bilaterally. Normal auditory canals and external ears. Non-tender.  Nose Exam: Normal external nose, mucus membranes, and septum.  OroPharynx Exam:  Lips are chapped and flaky.  No redness around the mouth. Dental hygiene adequate. Normal buccal mucosa. Normal pharynx.  Neck Exam:  Supple, no masses or nodes. No bruits  Thyroid Exam: No nodules or enlargement.  Chest/Respiratory Exam: Normal chest wall and respirations. Clear to auscultation.  Breast Exam: No dimpling, nipple retraction or discharge. No masses or nodes.  Cardiovascular Exam: Regular rate and rhythm. S1, S2, no murmur, click, gallop, or rubs.  Gastrointestinal Exam: Soft, non-tender, no masses or organomegaly.  Lymphatic Exam: Non-palpable nodes in neck,clavicular, axillary, or inguinal regions.  Musculoskeletal Exam: Back is straight and non-tender, full ROM of upper and lower extremities.  Foot Exam: Left and right foot: good pedal pulses  Skin: no rash or abnormalities  Neurologic Exam:  normal gross motor, tone coordination and no tremor.  Psychiatric Exam: Alert and oriented - appropriate affect.     Results for orders placed or performed in visit on 05/08/19   Thyrotropin [facility]   Result Value Ref Range    Thyrotropin 2.44 0.34 - 5.60 IU/mL   T4 free   Result Value Ref Range    T4 Free 0.85 0.60 - 1.60 ng/dL   Comprehensive metabolic panel   Result Value Ref Range    Sodium 140 134 - 144 mmol/L    Potassium 3.7 3.5 - 5.1 mmol/L    Chloride 108 (H) 98 - 107 mmol/L    Carbon Dioxide 27 21 - 31 mmol/L    Anion Gap 5 3 - 14 mmol/L    Glucose 98 70 - 105 mg/dL    Urea Nitrogen 11 7 - 25 mg/dL    Creatinine 0.84 0.60 - 1.20 mg/dL    GFR Estimate 81 >60 mL/min/[1.73_m2]    GFR Estimate If Black >90 >60 mL/min/[1.73_m2]    Calcium 9.2 8.6 - 10.3 mg/dL    Bilirubin Total 0.5 0.3 - 1.0 mg/dL    Albumin 4.4 3.5 - 5.7 g/dL    Protein Total 7.4 6.4 - 8.9  g/dL    Alkaline Phosphatase 40 34 - 104 U/L    ALT 12 7 - 52 U/L    AST 14 13 - 39 U/L   CBC with platelets differential   Result Value Ref Range    WBC 13.6 (H) 4.0 - 11.0 10e9/L    RBC Count 4.56 3.8 - 5.2 10e12/L    Hemoglobin 13.8 11.7 - 15.7 g/dL    Hematocrit 41.3 35.0 - 47.0 %    MCV 91 78 - 100 fl    MCH 30.3 26.5 - 33.0 pg    MCHC 33.4 31.5 - 36.5 g/dL    RDW 11.7 10.0 - 15.0 %    Platelet Count 263 150 - 450 10e9/L    Diff Method Automated Method     % Neutrophils 74.1 %    % Lymphocytes 17.2 %    % Monocytes 6.3 %    % Eosinophils 1.6 %    % Basophils 0.4 %    % Immature Granulocytes 0.4 %    Absolute Neutrophil 10.1 (H) 1.6 - 8.3 10e9/L    Absolute Lymphocytes 2.3 0.8 - 5.3 10e9/L    Absolute Monocytes 0.9 0.0 - 1.3 10e9/L    Absolute Eosinophils 0.2 0.0 - 0.7 10e9/L    Absolute Basophils 0.1 0.0 - 0.2 10e9/L    Abs Immature Granulocytes 0.1 0 - 0.4 10e9/L      ASSESSMENT/Plan :    Ita was seen today for physical.    Diagnoses and all orders for this visit:    Visit for preventive health examination    Cheilitis  -     triamcinolone (KENALOG) 0.1 % external ointment; Apply topically 2 times daily    Screening for deficiency anemia  -     CBC with platelets differential; Future  -     CBC with platelets differential    Screening for metabolic disorder  -     Comprehensive metabolic panel; Future  -     Comprehensive metabolic panel    Thyroid nodule  -     T4 free; Future  -     Thyrotropin [facility]; Future  -     Thyrotropin [facility]  -     T4 free       Will notify of lab results when available. Discussed diet, exercise and healthy lifestyle changes.     Continue to follow-up with her mental health provider.    Stop Metrogel.  Trial of triamcinolone 0.1% ointment for her lips twice daily.  She should use this for 2 weeks and if it is not improving she will let us know.      Will Reynoso MD

## 2019-05-08 NOTE — NURSING NOTE
"Chief Complaint   Patient presents with     Physical       Initial /84   Pulse 96   Temp 97.7  F (36.5  C) (Temporal)   Resp 16   Ht 1.581 m (5' 2.25\")   Wt 97.5 kg (215 lb)   SpO2 98%   Breastfeeding? No   BMI 39.01 kg/m   Estimated body mass index is 39.01 kg/m  as calculated from the following:    Height as of this encounter: 1.581 m (5' 2.25\").    Weight as of this encounter: 97.5 kg (215 lb).  Medication Reconciliation: complete    Marilu Jacobs LPN  "

## 2019-05-09 ASSESSMENT — ASTHMA QUESTIONNAIRES: ACT_TOTALSCORE: 25

## 2019-05-14 ENCOUNTER — ALLIED HEALTH/NURSE VISIT (OUTPATIENT)
Dept: FAMILY MEDICINE | Facility: OTHER | Age: 29
End: 2019-05-14
Attending: FAMILY MEDICINE
Payer: COMMERCIAL

## 2019-05-14 DIAGNOSIS — Z30.42 ENCOUNTER FOR DEPO-PROVERA CONTRACEPTION: Primary | ICD-10-CM

## 2019-05-14 PROCEDURE — 96372 THER/PROPH/DIAG INJ SC/IM: CPT

## 2019-05-14 PROCEDURE — 25000128 H RX IP 250 OP 636: Performed by: OBSTETRICS & GYNECOLOGY

## 2019-05-14 RX ADMIN — MEDROXYPROGESTERONE ACETATE 150 MG: 150 INJECTION, SUSPENSION INTRAMUSCULAR at 13:44

## 2019-05-14 NOTE — PROGRESS NOTES
Patient requests ongoing injections of Depo-Provera  Patient's name and  verified.    BP: 138/85 wt  Wt: 212.4 lb  Adverse reaction to last shot?  no  Heavy bleeding or more than 14 days bleeding since last shot?  Spotting 1 week prior to injection, has noted one blood clot pt reports pea sized  Wants to continue contraception for another 3 months, knowing that fertility may not return for up to 18 months?  yes  Recent migraine headaches?  no  Breast problems since last shot?  Yes, Dr. Reynoso seen you on the  and noted to be plugged duct  Problems with excessive hair loss, acne or facial hair?  No    NEXT INJECTION DUE: 19 - 19     Lavinia Tai 2019 1:30 PM

## 2019-05-17 ENCOUNTER — OFFICE VISIT (OUTPATIENT)
Dept: FAMILY MEDICINE | Facility: OTHER | Age: 29
End: 2019-05-17
Attending: NURSE PRACTITIONER
Payer: COMMERCIAL

## 2019-05-17 ENCOUNTER — HOSPITAL ENCOUNTER (OUTPATIENT)
Dept: GENERAL RADIOLOGY | Facility: OTHER | Age: 29
Discharge: HOME | End: 2019-05-17
Attending: NURSE PRACTITIONER | Admitting: NURSE PRACTITIONER
Payer: COMMERCIAL

## 2019-05-17 VITALS
BODY MASS INDEX: 38.05 KG/M2 | TEMPERATURE: 100.1 F | DIASTOLIC BLOOD PRESSURE: 84 MMHG | HEART RATE: 138 BPM | WEIGHT: 209.7 LBS | RESPIRATION RATE: 20 BRPM | OXYGEN SATURATION: 96 % | SYSTOLIC BLOOD PRESSURE: 122 MMHG

## 2019-05-17 DIAGNOSIS — R05.8 NON-PRODUCTIVE COUGH: ICD-10-CM

## 2019-05-17 DIAGNOSIS — J18.9 COMMUNITY ACQUIRED PNEUMONIA OF RIGHT LOWER LOBE OF LUNG: Primary | ICD-10-CM

## 2019-05-17 DIAGNOSIS — R50.9 FEVER AND CHILLS: ICD-10-CM

## 2019-05-17 DIAGNOSIS — J45.21 MILD INTERMITTENT ASTHMA WITH ACUTE EXACERBATION: ICD-10-CM

## 2019-05-17 PROCEDURE — 94640 AIRWAY INHALATION TREATMENT: CPT | Performed by: NURSE PRACTITIONER

## 2019-05-17 PROCEDURE — G0463 HOSPITAL OUTPT CLINIC VISIT: HCPCS | Mod: 25

## 2019-05-17 PROCEDURE — G0463 HOSPITAL OUTPT CLINIC VISIT: HCPCS

## 2019-05-17 PROCEDURE — 99214 OFFICE O/P EST MOD 30 MIN: CPT | Performed by: NURSE PRACTITIONER

## 2019-05-17 PROCEDURE — 25000125 ZZHC RX 250: Performed by: NURSE PRACTITIONER

## 2019-05-17 PROCEDURE — 71046 X-RAY EXAM CHEST 2 VIEWS: CPT | Mod: TC

## 2019-05-17 RX ORDER — DOXYCYCLINE 100 MG/1
100 CAPSULE ORAL 2 TIMES DAILY
Qty: 14 CAPSULE | Refills: 0 | Status: SHIPPED | OUTPATIENT
Start: 2019-05-17 | End: 2019-08-13

## 2019-05-17 RX ORDER — IPRATROPIUM BROMIDE AND ALBUTEROL SULFATE 2.5; .5 MG/3ML; MG/3ML
3 SOLUTION RESPIRATORY (INHALATION) ONCE
Status: COMPLETED | OUTPATIENT
Start: 2019-05-17 | End: 2019-05-17

## 2019-05-17 RX ORDER — PREDNISONE 20 MG/1
TABLET ORAL
Qty: 9 TABLET | Refills: 0 | Status: SHIPPED | OUTPATIENT
Start: 2019-05-17 | End: 2019-08-13

## 2019-05-17 RX ADMIN — IPRATROPIUM BROMIDE AND ALBUTEROL SULFATE 3 ML: .5; 3 SOLUTION RESPIRATORY (INHALATION) at 17:35

## 2019-05-17 ASSESSMENT — PAIN SCALES - GENERAL: PAINLEVEL: MODERATE PAIN (4)

## 2019-05-17 NOTE — PATIENT INSTRUCTIONS
Doxycycline twice daily x 7 days     Prednisone daily x 6 days - 2 tabs x 2 days then 1.5 tabs x 2 days then 1 tab x 2 days.  Take with food    Continue Albuterol inhaler     Follow up if any worsening or concerns

## 2019-05-17 NOTE — LETTER
[facility]  [address]  [place] [address]  Phone: [phone]  Fax: [phone]    May 17, 2019        Ita Poole  [address] [place] [id number]          To whom it may concern:    RE: Ita Poole    Patient was seen and treated today at our clinic and missed work on 5/18/19 due to illness.    Please contact me for questions or concerns.      Sincerely,        Negra Arvizu NP

## 2019-05-17 NOTE — NURSING NOTE
Chief Complaint   Patient presents with     Cough     Cough for 3 days. Non productive. Feels like its all in her chest. Today starting having body aches and chills. Has had to use albuterol Neb today at 1000 and inhaler today at 1330. When she blows her nose the sputum is green. No fevers until today. Has been taking day and night quil as needed. Little to now relief. Along with Ibuprofen.     Medication Reconciliation: complete    Bharti Turner, LPN

## 2019-05-17 NOTE — PROGRESS NOTES
"duonebHPI:    Ita Poole is a 29 year old female  who presents to clinic today for cough.    Cough for the past 3 days.  Chest discomfort and burning with coughing started today.  Non productive cough.  Deep and congested cough.  Wheezing and tight in chest.  Short of breath with coughing and walking.  Hx of asthma- used Albuterol nebulizer around 1000 and  inhaler at 1330.  Low grade fevers and chills started today.  Runny and stuffy nose for the past 3-4 days - green colored.  No sore throat.  Appetite decreased the past couple of days.  Drinking lots of water and coffee.  Decreased energy, fatigued, low this afternoon - \"hit her like a ton of bricks.\"      Taking Ibuprofen 800 mg, last around 1400.    Tried Dayquil and Nyquil without relief.    States her children have mild URI symptoms.          Past Medical History:   Diagnosis Date     Allergic rhinitis     No Comments Provided     Celiac disease     No Comments Provided     Mass of right index finger at the PIP joint 2018     S/P excision of mass right index finger 2018     Uncomplicated asthma     No Comments Provided     Past Surgical History:   Procedure Laterality Date      SECTION      ,4/10/14, Section     COLONOSCOPY      age 10     ESOPHAGOSCOPY, GASTROSCOPY, DUODENOSCOPY (EGD), COMBINED      [id number],ESOPHAGOGASTRODUODENOSCOPY,age 10, small bowel bx: celiac disease     EXCISE MASS UPPER EXTREMITY Right 2018    Procedure: EXCISE MASS UPPER EXTREMITY;  Right Index Finger Mass Excision;  Surgeon: Nate Allen DO;  Location: [facility] OR     LAPAROSCOPIC CHOLECYSTECTOMY      9/22/15,Cholecystectomy,Laparoscopic     S/P EXCISION OF MASS RIGHT INDEX FINGER Right 2018     Social History     Tobacco Use     Smoking status: Never Smoker     Smokeless tobacco: Never Used     Tobacco comment: Quit smoking: passive exposure   Substance Use Topics     Alcohol use: Yes     Alcohol/week: 0.0 oz     Comment: social      Current " Outpatient Medications   Medication Sig Dispense Refill     albuterol (PROAIR HFA/PROVENTIL HFA/VENTOLIN HFA) 108 (90 BASE) MCG/ACT Inhaler Inhale 2 puffs into the lungs every 4 hours as needed for wheezing       cyanocobalamin (VITAMIN B-12) 1000 MCG tablet Take by mouth daily       cyclobenzaprine (FLEXERIL) 10 MG tablet TAKE 1 TABLET BY MOUTH 3 TIMES DAILY IF NEEDED FOR MUSCLE SPASM. 30 tablet 5     EPI E-Z PEN LYNETTE 1:1000  IJ 1 TIME ONLY 1 0     ibuprofen (ADVIL/MOTRIN) 800 MG tablet TAKE 1 TABLET BY MOUTH 3 TIMES DAILY IF NEEDED FOR PAIN. 90 tablet 11     medroxyPROGESTERone (DEPO-PROVERA) 150 MG/ML injection Inject 1 mL (150 mg) into the muscle every 3 months 3 mL 3     omeprazole (PRILOSEC) 20 MG DR capsule Take 1 capsule by mouth       risperiDONE (RISPERDAL) 0.5 MG tablet TAKE 1 TABLET (0.5 MG) BY MOUTH TWICE A DAY AT BEDTIME AND AS NEEDED 180 tablet 3     sertraline (ZOLOFT) 100 MG tablet TAKE 1.5 TABLETS BY MOUTH ONCE DAILY. 135 tablet 3     triamcinolone (KENALOG) 0.1 % external ointment Apply topically 2 times daily 15 g 1     Allergies   Allergen Reactions     Amoxicillin-Pot Clavulanate Hives     Augmentin Diarrhea     Yeast Infection in colon     Azithromycin Nausea     Azithromycin      Can't take because it contains wheat     Codeine      constipation     Gluten      Gluten Meal      Other reaction(s): Constipation  Celiac disease, abdominal pain         Past medical history, past surgical history, current medications and allergies reviewed and accurate to the best of my knowledge.        ROS:  Refer to HPI    /84 (BP Location: Right arm, Patient Position: Sitting, Cuff Size: Adult Regular)   Pulse 138   Temp 100.1  F (37.8  C) (Tympanic)   Resp 20   Wt 95.1 kg (209 lb 11.2 oz)   SpO2 96%   Breastfeeding? No   BMI 38.05 kg/m      EXAM:  General Appearance: miserable appearing adult female, appropriate appearance for age. No acute distress  Head: normocephalic, atraumatic  Ears: Left TM  grey, translucent with bony landmarks appreciated, no erythema, dull effusion, no bulging, no purulence.  Right TM grey, translucent with bony landmarks appreciated, no erythema, no effusion, no bulging, no purulence.  Left auditory canal clear.  Right auditory canal clear.  Normal external ears, non tender.  Eyes: conjunctivae normal without erythema or irritation, no drainage or crusting, no eyelid swelling, pupils equal   Orophayrnx: moist mucous membranes, posterior pharynx without erythema, tonsils without hypertrophy, no erythema, no exudates or petechiae, no post nasal drip seen, no trismus, voice clear.    Nose:  Mild drainage and congestion present  Neck: supple without adenopathy  Respiratory: normal chest wall and respirations.  Normal effort.  Tight wheezing and rhonchi to auscultation bilaterally.  Frequent deep course congested wheezy cough appreciated, oxygen saturation 96%  Cardiac: RRR with no murmurs  Musculoskeletal:  Normal gait.  Equal movement of bilateral upper extremities.  Equal movement of bilateral lower extremities.    Psychological: normal affect, alert and pleasant        Xray:  EXAM:    XR Chest, 2 Views     EXAM DATE/TIME:    5/17/2019 5:22 PM     CLINICAL HISTORY:    29 years old, female; Mild intermittent asthma with (acute) exacerbation;   Cough; Fever, unspecified; Signs and symptoms; Cough and fever     TECHNIQUE:    Imaging protocol: XR of the chest, 2 views.     COMPARISON:    CR XR RIBS \T\ CHEST RT 3VW 7/20/2018 3:27 PM     FINDINGS:    Lungs: Unremarkable. No consolidation.    Pleural space: Unremarkable. No pleural effusion. No pneumothorax.    Heart/Mediastinum: Unremarkable. No cardiomegaly.    Bones/joints: Unremarkable.       Impression     IMPRESSION:   No acute findings.     THIS DOCUMENT HAS BEEN ELECTRONICALLY SIGNED BY SALLIE AMEZQUITA MD           ASSESSMENT/PLAN:  1. Non-productive cough  3. Fever and chills    - XR Chest 2 Views; Future    OTC - Tylenol or  ibuprofen PRN      2. Mild intermittent asthma with acute exacerbation    - XR Chest 2 Views; Future    - ipratropium - albuterol 0.5 mg/2.5 mg/3 mL (DUONEB) neb solution 3 mL    - predniSONE (DELTASONE) 20 MG tablet; Take 40 mg by mouth daily for 2 days, THEN 30 mg daily for 2 days, THEN 20 mg daily for 2 days.  Dispense: 9 tablet; Refill: 0      4. Community acquired pneumonia of right lower lobe of lung (H)    CXR completed and reviewed, appears to have right lower lobe infiltrate next to the heart boarder, radiologist over read:  No acute findings.     - doxycycline hyclate (VIBRAMYCIN) 100 MG capsule; Take 1 capsule (100 mg) by mouth 2 times daily for 7 days  Dispense: 14 capsule; Refill: 0  (Allergies to Azithromycin and Amoxicillin).      Continue Albuterol inhaler PRN    OTC Mucinex PRN     Symptomatic treatment - Encouraged fluids, honey, elevation, humidifier, lozenges, etc     Discussed warning signs/symptoms indicative of need to f/u    Follow up if symptoms persist or worsen or concerns

## 2019-05-20 DIAGNOSIS — J45.21 MILD INTERMITTENT ASTHMA WITH ACUTE EXACERBATION: Primary | ICD-10-CM

## 2019-05-20 RX ORDER — IPRATROPIUM BROMIDE AND ALBUTEROL SULFATE 2.5; .5 MG/3ML; MG/3ML
1 SOLUTION RESPIRATORY (INHALATION) EVERY 6 HOURS PRN
Qty: 1 BOX | Refills: 3 | Status: SHIPPED | OUTPATIENT
Start: 2019-05-20 | End: 2020-11-20

## 2019-08-13 ENCOUNTER — OFFICE VISIT (OUTPATIENT)
Dept: FAMILY MEDICINE | Facility: OTHER | Age: 29
End: 2019-08-13
Attending: FAMILY MEDICINE
Payer: COMMERCIAL

## 2019-08-13 VITALS
WEIGHT: 219 LBS | BODY MASS INDEX: 39.73 KG/M2 | RESPIRATION RATE: 16 BRPM | OXYGEN SATURATION: 97 % | DIASTOLIC BLOOD PRESSURE: 80 MMHG | TEMPERATURE: 97.6 F | HEART RATE: 84 BPM | SYSTOLIC BLOOD PRESSURE: 116 MMHG

## 2019-08-13 DIAGNOSIS — Z30.42 ENCOUNTER FOR DEPO-PROVERA CONTRACEPTION: ICD-10-CM

## 2019-08-13 DIAGNOSIS — E53.8 VITAMIN B12 DEFICIENCY (NON ANEMIC): ICD-10-CM

## 2019-08-13 DIAGNOSIS — R40.0 DAYTIME SOMNOLENCE: Primary | ICD-10-CM

## 2019-08-13 DIAGNOSIS — E55.9 VITAMIN D DEFICIENCY: ICD-10-CM

## 2019-08-13 LAB
DEPRECATED CALCIDIOL+CALCIFEROL SERPL-MC: 18.1 NG/ML
VIT B12 SERPL-MCNC: 814 PG/ML (ref 180–914)

## 2019-08-13 PROCEDURE — G0463 HOSPITAL OUTPT CLINIC VISIT: HCPCS

## 2019-08-13 PROCEDURE — 99214 OFFICE O/P EST MOD 30 MIN: CPT | Performed by: FAMILY MEDICINE

## 2019-08-13 PROCEDURE — 25000128 H RX IP 250 OP 636: Performed by: FAMILY MEDICINE

## 2019-08-13 PROCEDURE — 82306 VITAMIN D 25 HYDROXY: CPT | Mod: ZL | Performed by: FAMILY MEDICINE

## 2019-08-13 PROCEDURE — 82607 VITAMIN B-12: CPT | Mod: ZL | Performed by: FAMILY MEDICINE

## 2019-08-13 PROCEDURE — 36415 COLL VENOUS BLD VENIPUNCTURE: CPT | Mod: ZL | Performed by: FAMILY MEDICINE

## 2019-08-13 PROCEDURE — 96372 THER/PROPH/DIAG INJ SC/IM: CPT

## 2019-08-13 RX ORDER — MEDROXYPROGESTERONE ACETATE 150 MG/ML
150 INJECTION, SUSPENSION INTRAMUSCULAR ONCE
Status: COMPLETED | OUTPATIENT
Start: 2019-08-13 | End: 2019-08-13

## 2019-08-13 RX ADMIN — MEDROXYPROGESTERONE ACETATE 150 MG: 150 INJECTION, SUSPENSION, EXTENDED RELEASE INTRAMUSCULAR at 16:48

## 2019-08-13 ASSESSMENT — ANXIETY QUESTIONNAIRES
3. WORRYING TOO MUCH ABOUT DIFFERENT THINGS: NOT AT ALL
1. FEELING NERVOUS, ANXIOUS, OR ON EDGE: NOT AT ALL
5. BEING SO RESTLESS THAT IT IS HARD TO SIT STILL: SEVERAL DAYS
7. FEELING AFRAID AS IF SOMETHING AWFUL MIGHT HAPPEN: NOT AT ALL
2. NOT BEING ABLE TO STOP OR CONTROL WORRYING: NOT AT ALL
6. BECOMING EASILY ANNOYED OR IRRITABLE: NOT AT ALL
GAD7 TOTAL SCORE: 3
IF YOU CHECKED OFF ANY PROBLEMS ON THIS QUESTIONNAIRE, HOW DIFFICULT HAVE THESE PROBLEMS MADE IT FOR YOU TO DO YOUR WORK, TAKE CARE OF THINGS AT HOME, OR GET ALONG WITH OTHER PEOPLE: NOT DIFFICULT AT ALL

## 2019-08-13 ASSESSMENT — PATIENT HEALTH QUESTIONNAIRE - PHQ9
SUM OF ALL RESPONSES TO PHQ QUESTIONS 1-9: 9
5. POOR APPETITE OR OVEREATING: MORE THAN HALF THE DAYS

## 2019-08-13 ASSESSMENT — PAIN SCALES - GENERAL: PAINLEVEL: NO PAIN (0)

## 2019-08-13 NOTE — NURSING NOTE
"Chief Complaint   Patient presents with     MOOD CHANGES     mood swings that come and goes       Initial /80   Pulse 84   Temp 97.6  F (36.4  C) (Temporal)   Resp 16   Wt 99.3 kg (219 lb)   SpO2 97%   Breastfeeding? No   BMI 39.73 kg/m   Estimated body mass index is 39.73 kg/m  as calculated from the following:    Height as of 5/8/19: 1.581 m (5' 2.25\").    Weight as of this encounter: 99.3 kg (219 lb).  Medication Reconciliation: complete    Marilu Jacobs LPN     She stated she has had mood swings off and on. They come mostly around when she gets her period. She has been overly hungary also.  Marilu Jacobs LPN..................8/13/2019   4:06 PM    "

## 2019-08-13 NOTE — PROGRESS NOTES
"Nursing Notes:   Marilu Jacobs LPN  8/13/2019  4:06 PM  Signed  Chief Complaint   Patient presents with     MOOD CHANGES     mood swings that come and goes       Initial /80   Pulse 84   Temp 97.6  F (36.4  C) (Temporal)   Resp 16   Wt 99.3 kg (219 lb)   SpO2 97%   Breastfeeding? No   BMI 39.73 kg/m    Estimated body mass index is 39.73 kg/m  as calculated from the following:    Height as of 5/8/19: 1.581 m (5' 2.25\").    Weight as of this encounter: 99.3 kg (219 lb).  Medication Reconciliation: complete    Marilu Jacobs LPN     She stated she has had mood swings off and on. They come mostly around when she gets her period. She has been overly hungary also.  Marilu Jacobs LPN..................8/13/2019   4:06 PM      SUBJECTIVE:  Ita Poole  is a 29 year old female who comes in today with several concerns.  I saw her for a physical in May.  She continues on Depo-Provera for birth control and needs that shot today.  She had Pap smear and HPV in November 2018 as well as ultrasound of the pelvis as she was being evaluated for heavy periods.  She had normal labs in May including her thyroid and metabolic panel which were normal.    She continues on sertraline 150 mg daily and risperidone 0.5 mg twice daily and at bedtime as needed under the guidance of Michelle Case at [facility].  She had just started a new job at the [employer] in May.     She was seen later in May with a presumed community-acquired pneumonia although her chest x-ray was negative.  She was treated with a burst of prednisone along with doxycycline.  She does have asthma.    She gets tired easily. She has mood swings more.  She doesn't snore but talks a lot in her sleep. She has a lot of hunger. They do eat a fair amount of carbs. She sleeps on her side.      Past Medical, Family, and Social History reviewed and updated as noted below.   ROS is negative except as noted above       Allergies   Allergen Reactions     " Amoxicillin-Pot Clavulanate Hives     Augmentin Diarrhea     Yeast Infection in colon     Azithromycin Nausea     Azithromycin      Can't take because it contains wheat     Codeine      constipation     Gluten      Gluten Meal      Other reaction(s): Constipation  Celiac disease, abdominal pain   ,   Family History   Problem Relation Age of Onset     Asthma Mother         Asthma     Allergy (Severe) Mother         Allergies     Asthma Father         Asthma     Heart Disease Father 43        Heart Disease, of MI     Diabetes Father         Diabetes     Asthma Maternal Grandmother         Asthma     Diabetes Paternal Grandfather         Diabetes   ,   Current Outpatient Medications   Medication     albuterol (PROAIR HFA/PROVENTIL HFA/VENTOLIN HFA) 108 (90 BASE) MCG/ACT Inhaler     cyanocobalamin (VITAMIN B-12) 1000 MCG tablet     cyclobenzaprine (FLEXERIL) 10 MG tablet     EPI E-Z PEN LYNETTE 1:1000  IJ     ibuprofen (ADVIL/MOTRIN) 800 MG tablet     medroxyPROGESTERone (DEPO-PROVERA) 150 MG/ML injection     omeprazole (PRILOSEC) 20 MG DR capsule     risperiDONE (RISPERDAL) 0.5 MG tablet     sertraline (ZOLOFT) 100 MG tablet     ipratropium - albuterol 0.5 mg/2.5 mg/3 mL (DUONEB) 0.5-2.5 (3) MG/3ML neb solution     triamcinolone (KENALOG) 0.1 % external ointment     Current Facility-Administered Medications   Medication     medroxyPROGESTERone (DEPO-PROVERA) injection 150 mg     medroxyPROGESTERone (DEPO-PROVERA) injection 150 mg   ,   Past Medical History:   Diagnosis Date     Allergic rhinitis     No Comments Provided     Celiac disease     No Comments Provided     Mass of right index finger at the PIP joint 2018     S/P excision of mass right index finger 2018     Uncomplicated asthma     No Comments Provided   ,   Patient Active Problem List    Diagnosis Date Noted     S/P excision of mass right index finger 2018     Priority: Medium     Cold thyroid nodule 2017     Priority: Medium      Obesity 2017     Priority: Medium     Irregular menses 2016     Priority: Medium     H/O  section 2014     Priority: Medium     Major depressive disorder, recurrent episode, moderate (H) 2013     Priority: Medium     Overview:   Dx added per office visit on 12 with Dr. Burch.  Fátima Baltazar RN, Clinical , [facility], 2013 5:02 PM       Vitamin D deficiency 2007     Priority: Medium     Problem list name updated by automated process. Provider to review       Angioedema 2006     Priority: Medium     Problem list name updated by automated process. Provider to review       Celiac disease 2006     Priority: Medium     Abdominal pain, generalized 2006     Priority: Medium     C.diff, admitted for obs. X 24 hrs. Flagyl started as outpt. Laura Moffett MD         Depressive disorder, not elsewhere classified 10/28/2005     Priority: Medium     Followed at [facility], meds by Marnie Marsh RN       Moderate persistent asthma 2005     Priority: Medium     aap completed         Allergic rhinitis 2005     Priority: Medium     Problem list name updated by automated process. Provider to review       Migraine 2005     Priority: Medium     Problem list name updated by automated process. Provider to review     ,   Past Surgical History:   Procedure Laterality Date      SECTION      ,4/10/14, Section     COLONOSCOPY      age 10     ESOPHAGOSCOPY, GASTROSCOPY, DUODENOSCOPY (EGD), COMBINED      [id number],ESOPHAGOGASTRODUODENOSCOPY,age 10, small bowel bx: celiac disease     EXCISE MASS UPPER EXTREMITY Right 2018    Procedure: EXCISE MASS UPPER EXTREMITY;  Right Index Finger Mass Excision;  Surgeon: Nate Allen DO;  Location: [facility] OR     LAPAROSCOPIC CHOLECYSTECTOMY      9/22/15,Cholecystectomy,Laparoscopic     S/P EXCISION OF MASS RIGHT INDEX FINGER Right 2018    and    Social History     Tobacco Use     Smoking status: Never Smoker     Smokeless tobacco: Never Used     Tobacco comment: Quit smoking: passive exposure   Substance Use Topics     Alcohol use: Yes     Alcohol/week: 0.0 oz     Comment: social      OBJECTIVE:  /80   Pulse 84   Temp 97.6  F (36.4  C) (Temporal)   Resp 16   Wt 99.3 kg (219 lb)   SpO2 97%   Breastfeeding? No   BMI 39.73 kg/m      EXAM:  Alert and cooperative, no distress.  Affect is appropriate.  Throat is not crowded.  Recent labs reviewed   Results for RENEE WILKS (MRN [id number]) as of 8/13/2019 16:35   Ref. Range 5/8/2019 10:54   Sodium Latest Ref Range: 134 - 144 mmol/L 140   Potassium Latest Ref Range: 3.5 - 5.1 mmol/L 3.7   Chloride Latest Ref Range: 98 - 107 mmol/L 108 (H)   Carbon Dioxide Latest Ref Range: 21 - 31 mmol/L 27   Urea Nitrogen Latest Ref Range: 7 - 25 mg/dL 11   Creatinine Latest Ref Range: 0.60 - 1.20 mg/dL 0.84   GFR Estimate Latest Ref Range: >60 mL/min/1.73_m2 81   GFR Estimate If Black Latest Ref Range: >60 mL/min/1.73_m2 >90   Calcium Latest Ref Range: 8.6 - 10.3 mg/dL 9.2   Anion Gap Latest Ref Range: 3 - 14 mmol/L 5   Albumin Latest Ref Range: 3.5 - 5.7 g/dL 4.4   Protein Total Latest Ref Range: 6.4 - 8.9 g/dL 7.4   Bilirubin Total Latest Ref Range: 0.3 - 1.0 mg/dL 0.5   Alkaline Phosphatase Latest Ref Range: 34 - 104 U/L 40   ALT Latest Ref Range: 7 - 52 U/L 12   AST Latest Ref Range: 13 - 39 U/L 14   T4 Free Latest Ref Range: 0.60 - 1.60 ng/dL 0.85   Thyrotropin Latest Ref Range: 0.34 - 5.60 IU/mL 2.44   Glucose Latest Ref Range: 70 - 105 mg/dL 98   WBC Latest Ref Range: 4.0 - 11.0 10e9/L 13.6 (H)   Hemoglobin Latest Ref Range: 11.7 - 15.7 g/dL 13.8   Hematocrit Latest Ref Range: 35.0 - 47.0 % 41.3   Platelet Count Latest Ref Range: 150 - 450 10e9/L 263   RBC Count Latest Ref Range: 3.8 - 5.2 10e12/L 4.56   MCV Latest Ref Range: 78 - 100 fl 91   MCH Latest Ref Range: 26.5 - 33.0 pg 30.3   MCHC Latest Ref  Range: 31.5 - 36.5 g/dL 33.4   RDW Latest Ref Range: 10.0 - 15.0 % 11.7   Diff Method Unknown Automated Method   % Neutrophils Latest Units: % 74.1   % Lymphocytes Latest Units: % 17.2   % Monocytes Latest Units: % 6.3   % Eosinophils Latest Units: % 1.6   % Basophils Latest Units: % 0.4   % Immature Granulocytes Latest Units: % 0.4   Absolute Neutrophil Latest Ref Range: 1.6 - 8.3 10e9/L 10.1 (H)   Absolute Lymphocytes Latest Ref Range: 0.8 - 5.3 10e9/L 2.3   Absolute Monocytes Latest Ref Range: 0.0 - 1.3 10e9/L 0.9   Absolute Eosinophils Latest Ref Range: 0.0 - 0.7 10e9/L 0.2   Absolute Basophils Latest Ref Range: 0.0 - 0.2 10e9/L 0.1   Abs Immature Granulocytes Latest Ref Range: 0 - 0.4 10e9/L 0.1       ASSESSMENT/Plan :    Ita was seen today for mood changes.    Diagnoses and all orders for this visit:    Daytime somnolence  -     SLEEP EVALUATION & MANAGEMENT REFERRAL - [facility] [phone] (Age 13 and up); Future    Vitamin D deficiency  -     Vitamin D Total [facility]; Future    Vitamin B12 deficiency (non anemic)  -     Vitamin B12; Future    Encounter for Depo-Provera contraception  -     medroxyPROGESTERone (DEPO-PROVERA) injection 150 mg      Was not clear that she has something like sleep apnea, she may have another type of sleep disorder.  It sounds as if she has a tendency to sleep walk and may have some type of a REM disturbance.  Certainly narcolepsy is a possibility.  Will check vitamin B12 and vitamin D levels to see if that might be contributing factor.    Discussed watching carbohydrates to see if changing her glycemic index might improve her constant hunger and might improve on her tiredness. Discussed diet, exercise and healthy lifestyle changes.     Depo-Provera is given today.    A total of 25 minutes was spent with the patient, greater than 50% of the time was spent in counseling/discussion of the aforementioned concerns.     Will Reynoso,  MD

## 2019-08-14 ASSESSMENT — ASTHMA QUESTIONNAIRES: ACT_TOTALSCORE: 24

## 2019-08-14 ASSESSMENT — ANXIETY QUESTIONNAIRES: GAD7 TOTAL SCORE: 3

## 2019-09-12 DIAGNOSIS — E55.9 VITAMIN D DEFICIENCY: ICD-10-CM

## 2019-09-16 RX ORDER — METHOCARBAMOL 750 MG/1
TABLET ORAL
Qty: 8 CAPSULE | OUTPATIENT
Start: 2019-09-16

## 2019-09-16 NOTE — TELEPHONE ENCOUNTER
"Zo G[facility] sent Rx request for the following:      D3-50 78896 units capsule      Last Prescription Date:   8/15/19  Last Fill Qty/Refills:         16, R-0    Last Office Visit:              8/13/19   Future Office visit:           None noted    Refill request refused.  Message to pt from PCP regarding vitamin D lab -  \"I sent a prescription for high dose Vitamin D for 8 weeks, then you should take 5000 units daily thereafter\".  Rx states \"Take 1 capsule (50,000 Units) by mouth twice a week Monday and Thursday - Oral\"  This should be finished around 10/16/19.    Unable to complete prescription refill per RNMedication Refill Policy.................... Lidia Lorenzo RN ....................  9/16/2019   1:17 PM                        "

## 2019-09-28 ENCOUNTER — HEALTH MAINTENANCE LETTER (OUTPATIENT)
Age: 29
End: 2019-09-28

## 2019-10-10 ENCOUNTER — ALLIED HEALTH/NURSE VISIT (OUTPATIENT)
Dept: FAMILY MEDICINE | Facility: OTHER | Age: 29
End: 2019-10-10
Payer: COMMERCIAL

## 2019-10-10 DIAGNOSIS — Z23 NEED FOR PROPHYLACTIC VACCINATION AND INOCULATION AGAINST INFLUENZA: Primary | ICD-10-CM

## 2019-10-10 DIAGNOSIS — E55.9 VITAMIN D DEFICIENCY: Primary | ICD-10-CM

## 2019-10-10 PROCEDURE — 90686 IIV4 VACC NO PRSV 0.5 ML IM: CPT

## 2019-10-10 PROCEDURE — 90471 IMMUNIZATION ADMIN: CPT

## 2019-10-10 RX ORDER — CHOLECALCIFEROL (VITAMIN D3) 50 MCG
1 TABLET ORAL DAILY
Qty: 100 TABLET | Refills: 11 | Status: SHIPPED | OUTPATIENT
Start: 2019-10-10 | End: 2020-08-07

## 2019-11-18 ENCOUNTER — OFFICE VISIT (OUTPATIENT)
Dept: PULMONOLOGY | Facility: OTHER | Age: 29
End: 2019-11-18
Attending: FAMILY MEDICINE
Payer: COMMERCIAL

## 2019-11-18 VITALS
OXYGEN SATURATION: 98 % | HEART RATE: 84 BPM | WEIGHT: 234.6 LBS | BODY MASS INDEX: 43.17 KG/M2 | HEIGHT: 62 IN | DIASTOLIC BLOOD PRESSURE: 90 MMHG | TEMPERATURE: 98.6 F | SYSTOLIC BLOOD PRESSURE: 112 MMHG | RESPIRATION RATE: 16 BRPM

## 2019-11-18 DIAGNOSIS — R40.0 DAYTIME SOMNOLENCE: ICD-10-CM

## 2019-11-18 PROCEDURE — G0463 HOSPITAL OUTPT CLINIC VISIT: HCPCS

## 2019-11-18 ASSESSMENT — MIFFLIN-ST. JEOR: SCORE: 1751.36

## 2019-11-18 ASSESSMENT — PAIN SCALES - GENERAL: PAINLEVEL: NO PAIN (0)

## 2019-11-18 NOTE — PROGRESS NOTES
Sleep Medicine Note  Ita Poole  November 18, 2019  [id number]    Chief Complaint: dayime tiredness    History of Present Illness: Ita Poole is a 29 year old female presenting for above complaint. She has a history of daytime tiredness and snoring.  Her significant other says that she moans and talks in her sleep.  Snores loudly at times.  She goes to bed at 8 PM and watches TV in bed.  She will fall asleep and awaken between 6:30 AM and 7 AM on mornings when she does not work.  On mornings when she works she gets up at 4 AM.  During the daytime she feels sleepy and scores herself 9 on the [place] sleepiness score.  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  She has a history of anxiety and depression and is on medications for this.  She has a history of asthma.  She lives with her significant other and their 2 children.  Her children sleepwalk but are young.  She is familiar with sleep apnea.  Her father had bad sleep apnea.  He was treated with CPAP.  She has never had a sleep evaluation.    Does have occasional restless limb symptoms.  She is a non-smoker.  She drinks alcohol on a rare basis.    Past Medical History:  Past Medical History:   Diagnosis Date     Allergic rhinitis     No Comments Provided     Celiac disease     No Comments Provided     Mass of right index finger at the PIP joint 2/19/2018     S/P excision of mass right index finger 2/28/2018     Uncomplicated asthma     No Comments Provided       Medications:  Current Outpatient Medications   Medication     albuterol (PROAIR HFA/PROVENTIL HFA/VENTOLIN HFA) 108 (90 BASE) MCG/ACT Inhaler     cyanocobalamin (VITAMIN B-12) 1000 MCG tablet     cyclobenzaprine (FLEXERIL) 10 MG tablet     EPI E-Z PEN LYNETTE 1:1000  IJ     ibuprofen (ADVIL/MOTRIN) 800 MG tablet     ipratropium - albuterol 0.5 mg/2.5 mg/3 mL (DUONEB) 0.5-2.5 (3) MG/3ML neb solution     medroxyPROGESTERone (DEPO-PROVERA) 150 MG/ML  "injection     omeprazole (PRILOSEC) 20 MG DR capsule     risperiDONE (RISPERDAL) 0.5 MG tablet     sertraline (ZOLOFT) 100 MG tablet     triamcinolone (KENALOG) 0.1 % external ointment     vitamin D3 (CHOLECALCIFEROL) 2000 units (50 mcg) tablet     vitamin D3 (CHOLECALCIFEROL) 47767 units capsule     Current Facility-Administered Medications   Medication     medroxyPROGESTERone (DEPO-PROVERA) injection 150 mg       Physical Exam:  BP (!) 112/90 (BP Location: Right arm, Patient Position: Sitting, Cuff Size: Adult Large)   Pulse 84   Temp 98.6  F (37  C)   Resp 16   Ht 5' 2.25\" (1.581 m)   Wt 234 lb 9.6 oz (106.4 kg)   SpO2 98%   Breastfeeding No   BMI 42.56 kg/m    Neck circumference 14 inches, pharynx is without erythema, dentition is intact, malampatti class II.  Tonsillar tissue is noted.  Lungs are clear no wheeze, rhonchi, crackles, or focal dullness.  Cardiovascular exam S1-S2 regular rhythm and rate no murmur or rub    Assessment and Plan:  29 year old female presenting for poor sleep with morbid obesity snoring and daytime tiredness.  The diagnosis, pathophysiology, and treatment of obstructive sleep apnea as discussed in detail.  My recommendation is for a nocturnal polysomnogram split-night study.  This was ordered.  We discussed ordering positive pressure therapy if significant sleep apnea was shown to be present.    CC: [employer]      "

## 2019-11-18 NOTE — NURSING NOTE
Patient presents to the clinic for daytime sleepiness.   Medication Reconciliation Completed.    Yousif Lee LPN  11/18/2019 10:23 AM

## 2019-12-12 ENCOUNTER — ALLIED HEALTH/NURSE VISIT (OUTPATIENT)
Dept: FAMILY MEDICINE | Facility: OTHER | Age: 29
End: 2019-12-12
Attending: FAMILY MEDICINE
Payer: COMMERCIAL

## 2019-12-12 DIAGNOSIS — Z30.42 ENCOUNTER FOR DEPO-PROVERA CONTRACEPTION: Primary | ICD-10-CM

## 2019-12-12 LAB — HCG UR QL: NEGATIVE

## 2019-12-12 PROCEDURE — 96372 THER/PROPH/DIAG INJ SC/IM: CPT

## 2019-12-12 PROCEDURE — 81025 URINE PREGNANCY TEST: CPT | Mod: ZL | Performed by: FAMILY MEDICINE

## 2019-12-12 PROCEDURE — 25000128 H RX IP 250 OP 636: Performed by: OBSTETRICS & GYNECOLOGY

## 2019-12-12 RX ADMIN — MEDROXYPROGESTERONE ACETATE 150 MG: 150 INJECTION, SUSPENSION INTRAMUSCULAR at 13:50

## 2019-12-12 NOTE — PROGRESS NOTES
Verified patient's first and last name, and . Patient stated reason for visit today is to receive Depo. Patient denied any concerns with previous injections. Weight assessed: 237.6#. BP assessed: 138/90.    Patient requests ongoing injections of Depo-Provera  Date of last DMPA:  2019. Late in receiving next depo provera in series. HCG urine ordered. Patient brought to lab. HCG Urine: Negative.     The following questions asked by nurse:   Adverse reaction to last shot?  no  Heavy bleeding or more than 14 days bleeding sincelast shot?  no  Wants to continue contraception for another 3 months, knowing that fertility may not return for up to 18 months?  yes  Recent migraine headaches?  no  Breast problems since last shot?  no  Problems with excessive hair loss, acne or facial hair?  no    Depo Provera injection prepared and administered IM as ordered. Administration of medication documented in MAR (see MAR for further information regarding dose, lot #, NDC #, expiration date). Next Depo Provera injection in series due  - 2020. Patient provided appointment reminder card.       Luann MONIQUEN, RN on 2019 at 1:51 PM

## 2019-12-18 ENCOUNTER — THERAPY VISIT (OUTPATIENT)
Dept: SLEEP MEDICINE | Facility: OTHER | Age: 29
End: 2019-12-18
Attending: FAMILY MEDICINE
Payer: COMMERCIAL

## 2019-12-18 DIAGNOSIS — R06.83 HABITUAL SNORING: Primary | ICD-10-CM

## 2019-12-18 DIAGNOSIS — E66.9 OBESITY: ICD-10-CM

## 2019-12-18 DIAGNOSIS — F32.A DEPRESSION: ICD-10-CM

## 2019-12-18 PROCEDURE — 95810 POLYSOM 6/> YRS 4/> PARAM: CPT | Mod: TC

## 2020-01-09 ENCOUNTER — OFFICE VISIT (OUTPATIENT)
Dept: FAMILY MEDICINE | Facility: OTHER | Age: 30
End: 2020-01-09
Attending: FAMILY MEDICINE
Payer: COMMERCIAL

## 2020-01-09 VITALS
TEMPERATURE: 98.6 F | OXYGEN SATURATION: 98 % | RESPIRATION RATE: 18 BRPM | SYSTOLIC BLOOD PRESSURE: 144 MMHG | HEART RATE: 100 BPM | BODY MASS INDEX: 42.96 KG/M2 | DIASTOLIC BLOOD PRESSURE: 92 MMHG | WEIGHT: 236.8 LBS

## 2020-01-09 DIAGNOSIS — G56.01 CARPAL TUNNEL SYNDROME OF RIGHT WRIST: Primary | ICD-10-CM

## 2020-01-09 DIAGNOSIS — M62.838 MUSCLE SPASM: ICD-10-CM

## 2020-01-09 PROCEDURE — 99213 OFFICE O/P EST LOW 20 MIN: CPT | Performed by: FAMILY MEDICINE

## 2020-01-09 PROCEDURE — G0463 HOSPITAL OUTPT CLINIC VISIT: HCPCS

## 2020-01-09 ASSESSMENT — PAIN SCALES - GENERAL: PAINLEVEL: MODERATE PAIN (4)

## 2020-01-09 NOTE — NURSING NOTE
"Chief Complaint   Patient presents with     Numbness     in right hand     She had a fall on 12/18/19 and since than her right wrist has been going numb on her. She is also having shooting pains in her wrist area. She was not seen for this yet no X-rays have been done. When there is pain at rest it throbs. When there is pain with moving it is a shooting pain.     Initial There were no vitals taken for this visit. Estimated body mass index is 42.56 kg/m  as calculated from the following:    Height as of 11/18/19: 1.581 m (5' 2.25\").    Weight as of 11/18/19: 106.4 kg (234 lb 9.6 oz).    Medication Reconciliation: complete      Dmitry Jiménez LPN  "

## 2020-01-09 NOTE — PROGRESS NOTES
"Nursing Notes:   Dmitry Jiménez LPN  2020  5:58 PM  Signed  Chief Complaint   Patient presents with     Numbness     in right hand     She had a fall on 19 and since than her right wrist has been going numb on her. She is also having shooting pains in her wrist area. She was not seen for this yet no X-rays have been done. When there is pain at rest it throbs. When there is pain with moving it is a shooting pain.     Initial There were no vitals taken for this visit. Estimated body mass index is 42.56 kg/m  as calculated from the following:    Height as of 19: 1.581 m (5' 2.25\").    Weight as of 19: 106.4 kg (234 lb 9.6 oz).    Medication Reconciliation: complete      Dmitry Jiménez LPN      SUBJECTIVE:  Ita Poole  is a 29 year old female who comes in today because of a fall that she had on .  She fell forward on the ice directly landing and striking the volar aspect of her right wrist on the pavement.  Rest of her bumps and bruises healed up fine but she has had ongoing issues with discomfort in the volar aspect of her wrist and into her hand.  Her right wrist has been going numb since then and having shooting pains.  It throbs at rest and shoots more when she moves it.    Past Medical, Family, and Social History reviewed and updated as noted below.   ROS is negative except as noted above       Allergies   Allergen Reactions     Amoxicillin-Pot Clavulanate Hives     Augmentin Diarrhea     Yeast Infection in colon     Azithromycin Nausea     Azithromycin      Can't take because it contains wheat     Codeine      constipation     Gluten      Gluten Meal      Other reaction(s): Constipation  Celiac disease, abdominal pain   ,   Family History   Problem Relation Age of Onset     Asthma Mother         Asthma     Allergy (Severe) Mother         Allergies     Asthma Father         Asthma     Heart Disease Father 43        Heart Disease, of MI     Diabetes Father         " Diabetes     Asthma Maternal Grandmother         Asthma     Diabetes Paternal Grandfather         Diabetes   ,   Current Outpatient Medications   Medication     albuterol (PROAIR HFA/PROVENTIL HFA/VENTOLIN HFA) 108 (90 BASE) MCG/ACT Inhaler     cyclobenzaprine (FLEXERIL) 10 MG tablet     EPI E-Z PEN LYNETTE 1:1000  IJ     ibuprofen (ADVIL/MOTRIN) 800 MG tablet     ipratropium - albuterol 0.5 mg/2.5 mg/3 mL (DUONEB) 0.5-2.5 (3) MG/3ML neb solution     medroxyPROGESTERone (DEPO-PROVERA) 150 MG/ML injection     omeprazole (PRILOSEC) 20 MG DR capsule     risperiDONE (RISPERDAL) 0.5 MG tablet     sertraline (ZOLOFT) 100 MG tablet     triamcinolone (KENALOG) 0.1 % external ointment     vitamin D3 (CHOLECALCIFEROL) 2000 units (50 mcg) tablet     Current Facility-Administered Medications   Medication     medroxyPROGESTERone (DEPO-PROVERA) injection 150 mg   ,   Past Medical History:   Diagnosis Date     Allergic rhinitis     No Comments Provided     Celiac disease     No Comments Provided     Mass of right index finger at the PIP joint 2018     S/P excision of mass right index finger 2018     Uncomplicated asthma     No Comments Provided   ,   Patient Active Problem List    Diagnosis Date Noted     S/P excision of mass right index finger 2018     Priority: Medium     Cold thyroid nodule 2017     Priority: Medium     Obesity 2017     Priority: Medium     Irregular menses 2016     Priority: Medium     H/O  section 2014     Priority: Medium     Major depressive disorder, recurrent episode, moderate (H) 2013     Priority: Medium     Overview:   Dx added per office visit on 12 with Dr. Minnie Baltazar RN, Clinical , [facility], 2013 5:02 PM       Vitamin D deficiency 2007     Priority: Medium     Problem list name updated by automated process. Provider to review       Angioedema 2006     Priority:  Medium     Problem list name updated by automated process. Provider to review       Celiac disease 2006     Priority: Medium     Abdominal pain, generalized 2006     Priority: Medium     C.diff, admitted for obs. X 24 hrs. Flagyl started as outpt. Laura Moffett MD         Depressive disorder, not elsewhere classified 10/28/2005     Priority: Medium     Followed at [facility], meds by Marnie Marsh RN       Moderate persistent asthma 2005     Priority: Medium     aap completed         Allergic rhinitis 2005     Priority: Medium     Problem list name updated by automated process. Provider to review       Migraine 2005     Priority: Medium     Problem list name updated by automated process. Provider to review     ,   Past Surgical History:   Procedure Laterality Date      SECTION      ,4/10/14, Section     COLONOSCOPY      age 10     ESOPHAGOSCOPY, GASTROSCOPY, DUODENOSCOPY (EGD), COMBINED      [id number],ESOPHAGOGASTRODUODENOSCOPY,age 10, small bowel bx: celiac disease     EXCISE MASS UPPER EXTREMITY Right 2018    Procedure: EXCISE MASS UPPER EXTREMITY;  Right Index Finger Mass Excision;  Surgeon: Nate Allen DO;  Location: [facility] OR     LAPAROSCOPIC CHOLECYSTECTOMY      9/22/15,Cholecystectomy,Laparoscopic     S/P EXCISION OF MASS RIGHT INDEX FINGER Right 2018    and   Social History     Tobacco Use     Smoking status: Never Smoker     Smokeless tobacco: Never Used     Tobacco comment: Quit smoking: passive exposure   Substance Use Topics     Alcohol use: Yes     Alcohol/week: 0.0 standard drinks     Comment: social      OBJECTIVE:  BP (!) 144/92   Pulse 100   Temp 98.6  F (37  C) (Tympanic)   Resp 18   Wt 107.4 kg (236 lb 12.8 oz)   SpO2 98%   BMI 42.96 kg/m     EXAM:  Alert cooperative, no distress.  She has no snuffbox tenderness or bony tenderness about the wrist and she has full range of motion.  Tinel sign is negative but Phalen sign is  positive.  She has no muscle wasting and reasonable  strength.  ASSESSMENT/Plan :    Ita was seen today for numbness.    Diagnoses and all orders for this visit:    Carpal tunnel syndrome of right wrist      It appears that she has a posttraumatic carpal tunnel syndrome of her right wrist.  Discussed ice anti-inflammatories and wearing a wrist splint for several weeks.  If her symptoms do not jeremy, would then consider moving ahead with either occupational therapy or EMG and surgical referral.  She will keep in touch with her progress.    Will Reynoso MD

## 2020-01-10 RX ORDER — CYCLOBENZAPRINE HCL 10 MG
TABLET ORAL
Qty: 30 TABLET | Refills: 5 | Status: SHIPPED | OUTPATIENT
Start: 2020-01-10 | End: 2021-01-18

## 2020-01-10 NOTE — TELEPHONE ENCOUNTER
[facility] #[id number]  sent Rx request for the following:        Last Office Visit:              1/19/2020  Future Office visit:           none     Disp Refills Start End CLOVIS   cyclobenzaprine (FLEXERIL) 10 MG tablet 30 tablet 5 2/13/2019  No   Sig: TAKE 1 TABLET BY MOUTH 3 TIMES DAILY IF NEEDED FOR        Routing refill request to provider for review/approval because:  Drug not on the [facility], [facility] or [facility] refill protocol or controlled substance      Note from Pharmacy 1/9/2020:We are filling last refill today and the patientis requesting authorization to refill once thatsupply has been used. Thank you!    Pt just filled this prescription and is requesting a new current refill. No specific notes addressing flexeril in last visit note 1/19/2020. Since patient has enough of the medication to get her till Monday will route this to PCP who will be here Monday 1/13/19.    Jennifer Cummings RN .............. 1/10/2020  10:29 AM

## 2020-02-05 ENCOUNTER — OFFICE VISIT (OUTPATIENT)
Dept: FAMILY MEDICINE | Facility: OTHER | Age: 30
End: 2020-02-05
Attending: FAMILY MEDICINE
Payer: COMMERCIAL

## 2020-02-05 VITALS
WEIGHT: 244 LBS | RESPIRATION RATE: 16 BRPM | TEMPERATURE: 98.6 F | HEART RATE: 98 BPM | OXYGEN SATURATION: 98 % | DIASTOLIC BLOOD PRESSURE: 86 MMHG | SYSTOLIC BLOOD PRESSURE: 134 MMHG | BODY MASS INDEX: 44.27 KG/M2

## 2020-02-05 DIAGNOSIS — J35.1 TONSILLAR HYPERTROPHY: Primary | ICD-10-CM

## 2020-02-05 DIAGNOSIS — K21.9 GASTROESOPHAGEAL REFLUX DISEASE WITHOUT ESOPHAGITIS: ICD-10-CM

## 2020-02-05 DIAGNOSIS — R06.83 SNORING: ICD-10-CM

## 2020-02-05 DIAGNOSIS — J30.0 VASOMOTOR RHINITIS: ICD-10-CM

## 2020-02-05 PROCEDURE — G0463 HOSPITAL OUTPT CLINIC VISIT: HCPCS

## 2020-02-05 PROCEDURE — 99213 OFFICE O/P EST LOW 20 MIN: CPT | Performed by: FAMILY MEDICINE

## 2020-02-05 RX ORDER — FLUTICASONE PROPIONATE 50 MCG
2 SPRAY, SUSPENSION (ML) NASAL DAILY
Qty: 18 ML | Refills: 11 | Status: SHIPPED | OUTPATIENT
Start: 2020-02-05 | End: 2022-06-07

## 2020-02-05 ASSESSMENT — PATIENT HEALTH QUESTIONNAIRE - PHQ9: SUM OF ALL RESPONSES TO PHQ QUESTIONS 1-9: 4

## 2020-02-05 ASSESSMENT — PAIN SCALES - GENERAL: PAINLEVEL: NO PAIN (0)

## 2020-02-05 NOTE — LETTER
My Depression Action Plan  Name: Ita Poole   Date of Birth 1990  Date: 2/5/2020    My doctor: Will Reynoso   My clinic: [facility]  [address]  [place] [id number] [phone]          GREEN    ZONE   Good Control    What it looks like:     Things are going generally well. You have normal ups and downs. You may even feel depressed from time to time, but bad moods usually last less than a day.   What you need to do:  1. Continue to care for yourself (see self care plan)  2. Check your depression survival kit and update it as needed  3. Follow your physician s recommendations including any medication.  4. Do not stop taking medication unless you consult with your physician first.           YELLOW         ZONE Getting Worse    What it looks like:     Depression is starting to interfere with your life.     It may be hard to get out of bed; you may be starting to isolate yourself from others.    Symptoms of depression are starting to last most all day and this has happened for several days.     You may have suicidal thoughts but they are not constant.   What you need to do:     1. Call your care team. Your response to treatment will improve if you keep your care team informed of your progress. Yellow periods are signs an adjustment may need to be made.     2. Continue your self-care.  Just get dressed and ready for the day.  Don't give yourself time to talk yourself out of it.    3. Talk to someone in your support network.    4. Open up your Depression Self-Care Plan/Wellness Kit.           RED    ZONE Medical Alert - Get Help    What it looks like:     Depression is seriously interfering with your life.     You may experience these or other symptoms: You can t get out of bed most days, can t work or engage in other necessary activities, you have trouble taking care of basic hygiene, or basic responsibilities, thoughts of suicide or death that will not go  away, self-injurious behavior.     What you need to do:  1. Call your care team and request a same-day appointment. If they are not available (weekends or after hours) call your local crisis line, emergency room or 911.            Depression Self-Care Plan / Wellness Kit    Self-Care for Depression  Here s the deal. Your body and mind are really not as separate as most people think.  What you do and think affects how you feel and how you feel influences what you do and think. This means if you do things that people who feel good do, it will help you feel better.  Sometimes this is all it takes.  There is also a place for medication and therapy depending on how severe your depression is, so be sure to consult with your medical provider and/ or Behavioral Health Consultant if your symptoms are worsening or not improving.     In order to better manage my stress, I will:    Exercise  Get some form of exercise, every day. This will help reduce pain and release endorphins, the  feel good  chemicals in your brain. This is almost as good as taking antidepressants!  This is not the same as joining a gym and then never going! (they count on that by the way ) It can be as simple as just going for a walk or doing some gardening, anything that will get you moving.      Hygiene   Maintain good hygiene (get out of bed in the morning, make your bed, brush your teeth, take a shower, and get dressed like you were going to work, even if you are unemployed).  If your clothes don't fit try to get ones that do.    Diet  Strive to eat foods that are good for me, drink plenty of water, and avoid excessive sugar, caffeine, alcohol, and other mood-altering substances.  Some foods that are helpful in depression are: complex carbohydrates, B vitamins, flaxseed, fish or fish oil, fresh fruits and vegetables.    Psychotherapy  Agree to participate in Individual Therapy (if recommended).    Medication  If prescribed medications, I agree to take  them.  Missing doses can result in serious side effects.  I understand that drinking alcohol, or other illicit drug use, may cause potential side effects.  I will not stop my medication abruptly without first discussing it with my provider.    Staying Connected With Others  Stay in touch with my friends, family members, and my primary care provider/team.    Use your imagination  Be creative.  We all have a creative side; it doesn t matter if it s oil painting, sand castles, or mud pies! This will also kick up the endorphins.    Witness Beauty  (AKA stop and smell the roses) Take a look outside, even in mid-winter. Notice colors, textures. Watch the squirrels and birds.     Service to others  Be of service to others.  There is always someone else in need.  By helping others we can  get out of ourselves  and remember the really important things.  This also provides opportunities for practicing all the other parts of the program.    Humor  Laugh and be silly!  Adjust your TV habits for less news and crime-drama and more comedy.    Control your stress  Try breathing deep, massage therapy, biofeedback, and meditation. Find time to relax each day.     Crisis Text Line  http://www.crisistextline.org    The Crisis Text Line serves anyone, in any type of crisis, providing access to free, 24/7 support and information via the medium people already use and trust:    Here's how it works:  1.  Text 980-989 from anywhere in the [place], anytime, about any type of crisis.  2.  A live, trained Crisis Counselor receives the text and responds quickly.  3.  The volunteer Crisis Counselor will help you move from a 'hot moment to a cool moment'.    My support system    Clinic Contact:  Phone number:    Contact 1:  Phone number:    Contact 2:  Phone number:    [employer]/:  Phone number:    Therapist:  Phone number:    Local crisis center:    Phone number:    Other community support:  Phone number:

## 2020-02-05 NOTE — LETTER
My Asthma Action Plan    Name: Ita Poole   YOB: 1990  Date: 2/5/2020   My doctor: Will Reynoso MD   My clinic: [facility]        My Control Medicine: None  My Rescue Medicine: Albuterol (Proair/Ventolin/Proventil HFA) 2-4 puffs EVERY 4 HOURS as needed. Use a spacer if recommended by your provider.  My Oral Steroid Medicine: none My Asthma Severity:   Moderate Persistent  Know your asthma triggers: upper respiratory infections               GREEN ZONE   Good Control    I feel good    No cough or wheeze    Can work, sleep and play without asthma symptoms       Take your asthma control medicine every day.     1. If exercise triggers your asthma, take your rescue medication    15 minutes before exercise or sports, and    During exercise if you have asthma symptoms  2. Spacer to use with inhaler: If you have a spacer, make sure to use it with your inhaler             YELLOW ZONE Getting Worse  I have ANY of these:    I do not feel good    Cough or wheeze    Chest feels tight    Wake up at night   1. Keep taking your Green Zone medications  2. Start taking your rescue medicine:    every 20 minutes for up to 1 hour. Then every 4 hours for 24-48 hours.  3. If you stay in the Yellow Zone for more than 12-24 hours, contact your doctor.  4. If you do not return to the Green Zone in 12-24 hours or you get worse, start taking your oral steroid medicine if prescribed by your provider.           RED ZONE Medical Alert - Get Help  I have ANY of these:    I feel awful    Medicine is not helping    Breathing getting harder    Trouble walking or talking    Nose opens wide to breathe       1. Take your rescue medicine NOW  2. If your provider has prescribed an oral steroid medicine, start taking it NOW  3. Call your doctor NOW  4. If you are still in the Red Zone after 20 minutes and you have not reached your doctor:    Take your rescue medicine again and    Call 911 or go to the emergency  room right away    See your regular doctor within 2 weeks of an Emergency Room or Urgent Care visit for follow-up treatment.          Annual Reminders:  Meet with Asthma Educator,  Flu Shot in the Fall, consider Pneumonia Vaccination for patients with asthma (aged 19 and older).    Pharmacy:    [id number] - [place], [place] - [address]  [facility] - [place] - [address]  [facility] - [place], [place] - [address].    Electronically signed by Will Reynoso MD   Date: 02/05/20                      Asthma Triggers  How To Control Things That Make Your Asthma Worse    Triggers are things that make your asthma worse.  Look at the list below to help you find your triggers and what you can do about them.  You can help prevent asthma flare-ups by staying away from your triggers.      Trigger                                                          What you can do   Cigarette Smoke  Tobacco smoke can make asthma worse. Do not allow smoking in your home, car or around you.  Be sure no one smokes at a child s day care or school.  If you smoke, ask your health care provider for ways to help you quit.  Ask family members to quit too.  Ask your health care provider for a referral to Quit Plan to help you quit smoking, or call 4-457-361-PLAN.     Colds, Flu, Bronchitis  These are common triggers of asthma. Wash your hands often.  Don t touch your eyes, nose or mouth.  Get a flu shot every year.     Dust Mites  These are tiny bugs that live in cloth or carpet. They are too small to see. Wash sheets and blankets in hot water every week.   Encase pillows and mattress in dust mite proof covers.  Avoid having carpet if you can. If you have carpet, vacuum weekly.   Use a dust mask and HEPA vacuum.   Pollen and Outdoor Mold  Some people are allergic to trees, grass, or weed pollen, or molds. Try to keep your windows closed.  Limit time  out doors when pollen count is high.   Ask you health care provider about taking medicine during allergy season.     Animal Dander  Some people are allergic to skin flakes, urine or saliva from pets with fur or feathers. Keep pets with fur or feathers out of your home.    If you can t keep the pet outdoors, then keep the pet out of your bedroom.  Keep the bedroom door closed.  Keep pets off cloth furniture and away from stuffed toys.     Mice, Rats, and Cockroaches   Some people are allergic to the waste from these pests.   Cover food and garbage.  Clean up spills and food crumbs.  Store grease in the refrigerator.   Keep food out of the bedroom.   Indoor Mold  This can be a trigger if your home has high moisture. Fix leaking faucets, pipes, or other sources of water.   Clean moldy surfaces.  Dehumidify basement if it is damp and smelly.   Smoke, Strong Odors, and Sprays  These can reduce air quality. Stay away from strong odors and sprays, such as perfume, powder, hair spray, paints, smoke incense, paint, cleaning products, candles and new carpet.   Exercise or Sports  Some people with asthma have this trigger. Be active!  Ask your doctor about taking medicine before sports or exercise to prevent symptoms.    Warm up for 5-10 minutes before and after sports or exercise.     Other Triggers of Asthma  Cold air:  Cover your nose and mouth with a scarf.  Sometimes laughing or crying can be a trigger.  Some medicines and food can trigger asthma.

## 2020-02-05 NOTE — NURSING NOTE
"Chief Complaint   Patient presents with     Throat Problem     large tonsils and snoring   She had a sleep study and it said she had mild sleep apnea. She feels it is because her tonsils are large. She would like to talk about this.  Marilu Jacobs LPN..................2/5/2020   2:57 PM      Initial /86   Pulse 98   Temp 98.6  F (37  C) (Temporal)   Resp 16   Wt 110.7 kg (244 lb)   SpO2 98%   Breastfeeding No   BMI 44.27 kg/m   Estimated body mass index is 44.27 kg/m  as calculated from the following:    Height as of 11/18/19: 1.581 m (5' 2.25\").    Weight as of this encounter: 110.7 kg (244 lb).  Medication Reconciliation: complete    Marilu Jacobs LPN  "

## 2020-02-05 NOTE — PROGRESS NOTES
"Nursing Notes:   Marilu Jacobs LPN  2/5/2020  3:10 PM  Signed  Chief Complaint   Patient presents with     Throat Problem     large tonsils and snoring   She had a sleep study and it said she had mild sleep apnea. She feels it is because her tonsils are large. She would like to talk about this.  Marilu Jacobs LPN..................2/5/2020   2:57 PM      Initial /86   Pulse 98   Temp 98.6  F (37  C) (Temporal)   Resp 16   Wt 110.7 kg (244 lb)   SpO2 98%   Breastfeeding No   BMI 44.27 kg/m    Estimated body mass index is 44.27 kg/m  as calculated from the following:    Height as of 11/18/19: 1.581 m (5' 2.25\").    Weight as of this encounter: 110.7 kg (244 lb).  Medication Reconciliation: complete    Marilu Jacobs LPN    SUBJECTIVE:  Ita Poole  is a 29 year old female who comes in today because of \"throat issues\".  She had a thyroid nodule about 3 years ago that we ultrasounded and it was 3.5 cm in greatest dimension in the right lobe of the thyroid.  This was a cold nodule on thyroid scan and ultrasound guided fine-needle aspiration revealed a colloid nodule.    I saw her about a month ago for carpal tunnel syndrome of the right wrist and we put her in a wrist splint and anti-inflammatories.We discussed occupational therapy or EMG and surgical referral.    She had sleep medicine referral and sleep study and while she has sleep issues, she did not have teo severe sleep apnea but they felt that she might benefit from auto titrate CPAP unit at low settings. She got it and it has not really helped.  She is having a lot of post nasal drainage.      Past Medical, Family, and Social History reviewed and updated as noted below.   ROS is negative except as noted above       Allergies   Allergen Reactions     Amoxicillin-Pot Clavulanate Hives     Augmentin Diarrhea     Yeast Infection in colon     Azithromycin Nausea     Azithromycin      Can't take because it contains wheat     Codeine      " constipation     Gluten      Gluten Meal      Other reaction(s): Constipation  Celiac disease, abdominal pain   ,   Family History   Problem Relation Age of Onset     Asthma Mother         Asthma     Allergy (Severe) Mother         Allergies     Asthma Father         Asthma     Heart Disease Father 43        Heart Disease, of MI     Diabetes Father         Diabetes     Asthma Maternal Grandmother         Asthma     Diabetes Paternal Grandfather         Diabetes   ,   Current Outpatient Medications   Medication     albuterol (PROAIR HFA/PROVENTIL HFA/VENTOLIN HFA) 108 (90 BASE) MCG/ACT Inhaler     cyclobenzaprine (FLEXERIL) 10 MG tablet     EPI E-Z PEN LYNETTE 1:1000  IJ     fluticasone (FLONASE) 50 MCG/ACT nasal spray     ibuprofen (ADVIL/MOTRIN) 800 MG tablet     ipratropium - albuterol 0.5 mg/2.5 mg/3 mL (DUONEB) 0.5-2.5 (3) MG/3ML neb solution     medroxyPROGESTERone (DEPO-PROVERA) 150 MG/ML injection     omeprazole (PRILOSEC) 20 MG DR capsule     risperiDONE (RISPERDAL) 0.5 MG tablet     sertraline (ZOLOFT) 100 MG tablet     triamcinolone (KENALOG) 0.1 % external ointment     vitamin D3 (CHOLECALCIFEROL) 2000 units (50 mcg) tablet     Current Facility-Administered Medications   Medication     medroxyPROGESTERone (DEPO-PROVERA) injection 150 mg   ,   Past Medical History:   Diagnosis Date     Allergic rhinitis     No Comments Provided     Celiac disease     No Comments Provided     Mass of right index finger at the PIP joint 2018     S/P excision of mass right index finger 2018     Uncomplicated asthma     No Comments Provided   ,   Patient Active Problem List    Diagnosis Date Noted     S/P excision of mass right index finger 2018     Priority: Medium     Cold thyroid nodule 2017     Priority: Medium     Obesity 2017     Priority: Medium     Irregular menses 2016     Priority: Medium     H/O  section 2014     Priority: Medium     Major depressive disorder,  recurrent episode, moderate (H) 2013     Priority: Medium     Overview:   Dx added per office visit on 12 with Dr. Burch.  Fátima Baltazar RN, Clinical , [facility], 2013 5:02 PM       Vitamin D deficiency 2007     Priority: Medium     Problem list name updated by automated process. Provider to review       Angioedema 2006     Priority: Medium     Problem list name updated by automated process. Provider to review       Celiac disease 2006     Priority: Medium     Abdominal pain, generalized 2006     Priority: Medium     C.diff, admitted for obs. X 24 hrs. Flagyl started as outpt. Laura Moffett MD         Depressive disorder, not elsewhere classified 10/28/2005     Priority: Medium     Followed at [facility], meds by Marnie Marsh RN       Moderate persistent asthma 2005     Priority: Medium     aap completed         Allergic rhinitis 2005     Priority: Medium     Problem list name updated by automated process. Provider to review       Migraine 2005     Priority: Medium     Problem list name updated by automated process. Provider to review     ,   Past Surgical History:   Procedure Laterality Date      SECTION      ,4/10/14, Section     COLONOSCOPY      age 10     ESOPHAGOSCOPY, GASTROSCOPY, DUODENOSCOPY (EGD), COMBINED      [id number],ESOPHAGOGASTRODUODENOSCOPY,age 10, small bowel bx: celiac disease     EXCISE MASS UPPER EXTREMITY Right 2018    Procedure: EXCISE MASS UPPER EXTREMITY;  Right Index Finger Mass Excision;  Surgeon: Nate Allen DO;  Location: [facility] OR     LAPAROSCOPIC CHOLECYSTECTOMY      9/22/15,Cholecystectomy,Laparoscopic     S/P EXCISION OF MASS RIGHT INDEX FINGER Right 2018    and   Social History     Tobacco Use     Smoking status: Never Smoker     Smokeless tobacco: Never Used     Tobacco comment: Quit smoking: passive exposure   Substance Use Topics      Alcohol use: Yes     Alcohol/week: 0.0 standard drinks     Comment: social      OBJECTIVE:  /86   Pulse 98   Temp 98.6  F (37  C) (Temporal)   Resp 16   Wt 110.7 kg (244 lb)   SpO2 98%   Breastfeeding No   BMI 44.27 kg/m     EXAM:  Alert cooperative, no distress.  Nose is clear.  Sinuses are nontender to palpation and transilluminate well.  TMs are clear.  Throat is clear but she does have hypertrophic cryptic tonsils.  Fairly crowded in the posterior pharynx.  Neck is supple without palpable adenopathy or thyromegaly.  No palpable thyroid nodules.  Lungs are clear, no rales rhonchi or wheezes are heard  ASSESSMENT/Plan :    Ita was seen today for throat problem.    Diagnoses and all orders for this visit:    Tonsillar hypertrophy  -     OTOLARYNGOLOGY REFERRAL    Gastroesophageal reflux disease without esophagitis  -     omeprazole (PRILOSEC) 20 MG DR capsule; Take 1 capsule (20 mg) by mouth daily    Vasomotor rhinitis  -     fluticasone (FLONASE) 50 MCG/ACT nasal spray; Spray 2 sprays into both nostrils daily    Snoring  -     OTOLARYNGOLOGY REFERRAL      Trial of Flonase to see if that will cut down on her postnasal drainage.  Renewed omeprazole.  Referred to Dr. Asencio for consultation and opinion as far as whether she might benefit from tonsillectomy.    A total of 15 minutes was spent with the patient, greater than 50% of the time was spent in counseling/discussion of the aforementioned concerns.     Will Reynoso MD

## 2020-02-06 ASSESSMENT — ASTHMA QUESTIONNAIRES: ACT_TOTALSCORE: 25

## 2020-02-24 DIAGNOSIS — S30.0XXA CONTUSION, BUTTOCK, INITIAL ENCOUNTER: ICD-10-CM

## 2020-02-24 DIAGNOSIS — M79.18 RIGHT BUTTOCK PAIN: ICD-10-CM

## 2020-02-25 RX ORDER — IBUPROFEN 800 MG/1
TABLET, FILM COATED ORAL
Qty: 90 TABLET | Refills: 11 | Status: SHIPPED | OUTPATIENT
Start: 2020-02-25 | End: 2021-03-22

## 2020-02-25 NOTE — TELEPHONE ENCOUNTER
" Disp Refills Start End CLOVIS   ibuprofen (ADVIL/MOTRIN) 800 MG tablet 90 tablet 11 2/13/2019  No   Sig: TAKE 1 TABLET BY MOUTH 3 TIMES DAILY IF NEEDED FOR PAIN.       LOV: 2/5/2020  Future Office visit:  No future appointment scheduled at this time.     Routing refill request to provider for review/approval because:  Failed protocol    Requested Prescriptions   Pending Prescriptions Disp Refills     ibuprofen (ADVIL/MOTRIN) 800 MG tablet [Pharmacy Med Name: IBUPROFEN 800MG TABLET] 90 tablet 11     Sig: TAKE 1 TABLET BY MOUTH 3 TIMES DAILY IF NEEDED FOR PAIN.       NSAID Medications Failed - 2/24/2020 11:58 AM        Failed - Normal CBC on file in past 12 months     Recent Labs   Lab Test 05/08/19  1054  10/18/16  1815   WBC 13.6*   < >  --    GICHWBC  --   --  10.0   RBC 4.56   < >  --    GICHRBC  --   --  4.37   HGB 13.8   < > 13.3   HCT 41.3   < > 38.6      < > 305    < > = values in this interval not displayed.                 Passed - Blood pressure under 140/90 in past 12 months     BP Readings from Last 3 Encounters:   02/05/20 134/86   01/09/20 (!) 144/92   11/18/19 (!) 112/90                 Passed - Normal ALT on file in past 12 months     Recent Labs   Lab Test 05/08/19  1054 10/18/16  1832   ALT 12  --    GICHALT  --  12             Passed - Normal AST on file in past 12 months     Recent Labs   Lab Test 05/08/19  1054 10/18/16  1832   AST 14  --    GICHAST  --  14             Passed - Recent (12 mo) or future (30 days) visit within the authorizing provider's specialty     Patient has had an office visit with the authorizing provider or a provider within the authorizing providers department within the previous 12 mos or has a future within next 30 days. See \"Patient Info\" tab in inbasket, or \"Choose Columns\" in Meds & Orders section of the refill encounter.              Passed - Patient is age 6-64 years        Passed - Medication is active on med list        Passed - No active pregnancy on record "        Passed - Normal serum creatinine on file in past 12 months     Recent Labs   Lab Test 05/08/19  1054   CR 0.84             Passed - No positive pregnancy test in past 12 months      Unable to complete prescription refill per RN Medication Refill Policy.................... Deloris Wallis RN ....................  2/25/2020   4:12 PM

## 2020-03-10 ENCOUNTER — OFFICE VISIT (OUTPATIENT)
Dept: OTOLARYNGOLOGY | Facility: OTHER | Age: 30
End: 2020-03-10
Attending: OTOLARYNGOLOGY
Payer: COMMERCIAL

## 2020-03-10 DIAGNOSIS — R06.83 SNORING: Primary | ICD-10-CM

## 2020-03-10 PROCEDURE — G0463 HOSPITAL OUTPT CLINIC VISIT: HCPCS

## 2020-03-17 NOTE — PROGRESS NOTES
document embedded image  Patient Name: Ita Poole    Address: [facility] [address]     YOB: 1990    [place] [address]    MR Number: [id number]    Phone: [phone]  PCP: Will Reynoso MD            Appointment Date: 03/10/20   Visit Provider: Jameson Asencio MD    cc: Will Reynoso MD; ~    ENT Progress Note    Visit Reasons: Tonsillar Hypertrophy/Snoring    HPI  History of Present Illness  Chief complaint:  Tonsillar hypertrophy    History  The patient is a 30-year-old female who has been recently diagnosed with sleep disordered breathing although she did not have true obstructive sleep apnea on sleep study..  She was started on his home CPAP trial but discontinued after couple of weeks.  She has always had significantly hypertrophied tonsils since the time she was a child.  She is here today requesting tonsil surgery to help with snoring and apnea.    Exam  Oral cavity oropharynx-the patient does have 3+ tonsillar hypertrophy.  She has fairly normal tongue base anatomy.  She does not have an excessively low-lying palate.  Neck-no masses or adenopathy  Indirect laryngoscopy-Clear hypopharynx and larynx  Nasal-no anterior obstruction or purulence  Head and neck integument-Clear  General-the patient appears well and in no distress  Neuro-there are no focal cranial nerve deficits    Home Medications     - Last Reconciled 20 by Sherry Townsend CMA    cholecalciferol (vitamin D3) 4,000 unit capsule  4,000 units PO DAILY  risperidone 0.5 mg tablet  mg PO  sertraline 100 mg tablet  150 mg PO DAILY    Allergies    wheat Adverse Reaction (Unverified 20 09:27)  Unknown    PFSH  PFSH:     Medical History (Reviewed 20 @ 09:28 by Sherry Townsend CMA)    Asthma  Depression  Headache    Surgical History (Reviewed 20 @ 09:28 by Sherry Townsend CMA)    History of surgical removal of ganglion cyst  Hx of  section  Hx of cholecystectomy    Family History (Reviewed 20 @  09:30 by Sherry Townsend CMA)  Other  Asthma  Cancer  Depression  Diabetes mellitus  Headache  Heart trouble  Hypertension  Kidney disease  Lung disease  Stroke  Thyroid disease       Social History (Reviewed 03/11/20 @ 09:29 by Sherry Townsend CMA)  Smoking Status:  Never smoker  second hand exposure:  Yes  alcohol intake:  current       A&P  Assessment & Plan  (1) Disrupted sleep-wake cycle:        Status: Acute        Code(s):  G47.20 - Circadian rhythm sleep disorder, unspecified type; F51.8 - Other sleep disorders not due to a substance or known physiological condition        The patient was encouraged to complete a full 3 month trial of CPAP use.  If this fails to help her with her rest then we can consider tonsillectomy UPPP.  (2) Snoring:        Status: Acute        Code(s):  R06.83 - Snoring      Jameson Asencio MD    03/10/20 1456    <Electronically signed by Jameson Asencio MD> 03/16/20 1243

## 2020-04-21 ENCOUNTER — MYC MEDICAL ADVICE (OUTPATIENT)
Dept: FAMILY MEDICINE | Facility: OTHER | Age: 30
End: 2020-04-21

## 2020-04-21 DIAGNOSIS — J45.21 MILD INTERMITTENT ASTHMA WITH ACUTE EXACERBATION: Primary | ICD-10-CM

## 2020-04-21 RX ORDER — PREDNISONE 10 MG/1
10 TABLET ORAL DAILY
Refills: 0 | Status: CANCELLED | OUTPATIENT
Start: 2020-04-21

## 2020-04-21 RX ORDER — PREDNISONE 20 MG/1
TABLET ORAL
Qty: 12 TABLET | Refills: 0 | Status: SHIPPED | OUTPATIENT
Start: 2020-04-21 | End: 2020-08-07

## 2020-04-21 NOTE — TELEPHONE ENCOUNTER
See [employer]t message. Patient requesting prednisone be sent to [facility].  Flor Lindo LPN on 4/21/2020 at 1:31 PM

## 2020-05-27 DIAGNOSIS — F33.1 MAJOR DEPRESSIVE DISORDER, RECURRENT EPISODE, MODERATE (H): ICD-10-CM

## 2020-05-27 RX ORDER — SERTRALINE HYDROCHLORIDE 100 MG/1
TABLET, FILM COATED ORAL
Qty: 135 TABLET | Refills: 1 | Status: SHIPPED | OUTPATIENT
Start: 2020-05-27 | End: 2020-08-07

## 2020-05-27 NOTE — TELEPHONE ENCOUNTER
[id number] sent Rx request for the following:      sertraline (ZOLOFT) 100 MG tablet  Sig: TAKE 1.5 TABLETS BY MOUTH ONCE DAILY.       Last Prescription Date:   5/1/2019  Last Fill Qty/Refills:         135, R-3    Last Office Visit:              2/5/2020   Future Office visit:           none      Prescription refilled per RN Medication Refill Policy.................... Antonio Buchanan RN ....................  5/27/2020   10:10 AM

## 2020-08-07 ENCOUNTER — VIRTUAL VISIT (OUTPATIENT)
Dept: FAMILY MEDICINE | Facility: OTHER | Age: 30
End: 2020-08-07
Attending: PHYSICIAN ASSISTANT
Payer: COMMERCIAL

## 2020-08-07 DIAGNOSIS — Z20.822 COVID-19 RULED OUT: Primary | ICD-10-CM

## 2020-08-07 DIAGNOSIS — Z20.822 COVID-19 RULED OUT: ICD-10-CM

## 2020-08-07 DIAGNOSIS — Z20.822 EXPOSURE TO COVID-19 VIRUS: Primary | ICD-10-CM

## 2020-08-07 PROCEDURE — 99212 OFFICE O/P EST SF 10 MIN: CPT | Mod: 95 | Performed by: PHYSICIAN ASSISTANT

## 2020-08-07 PROCEDURE — C9803 HOPD COVID-19 SPEC COLLECT: HCPCS

## 2020-08-07 PROCEDURE — U0003 INFECTIOUS AGENT DETECTION BY NUCLEIC ACID (DNA OR RNA); SEVERE ACUTE RESPIRATORY SYNDROME CORONAVIRUS 2 (SARS-COV-2) (CORONAVIRUS DISEASE [COVID-19]), AMPLIFIED PROBE TECHNIQUE, MAKING USE OF HIGH THROUGHPUT TECHNOLOGIES AS DESCRIBED BY CMS-2020-01-R: HCPCS | Mod: ZL | Performed by: PHYSICIAN ASSISTANT

## 2020-08-07 PROCEDURE — 99207 ZZC NO CHARGE NURSE ONLY: CPT

## 2020-08-07 NOTE — PATIENT INSTRUCTIONS
"Discharge Instructions for COVID-19 Patients  You have--or may have--COVID-19. Please follow the instructions listed below.   If you have a weakened immune system, discuss with your doctor any other actions you need to take.  How can I protect others?  If you have symptoms (fever, cough, body aches or trouble breathing):    Stay home and away from others (self-isolate) until:  ? At least 10 days have passed since your symptoms started. And   ? You've had no fever--and no medicine that reduces fever--for 3 full days (72 hours). And   ? Your other symptoms have resolved (gotten better).  If you don't show symptoms, but testing showed that you have COVID-19:    Stay home and away from others (self-isolate) until at least 10 days have passed since the date of your first positive COVID-19 test.  During this time    Stay in your own room, even for meals. Use your own bathroom if you can.    Stay away from others in your home. No hugging, kissing or shaking hands. No visitors.    Don't go to work, school or anywhere else.    Clean \"high touch\" surfaces often (doorknobs, counters, handles). Use household cleaning spray or wipes. You'll find a full list of  on the EPA website: www.epa.gov/pesticide-registration/list-n-disinfectants-use-against-sars-cov-2.    Cover your mouth and nose with a mask, tissue or wash cloth to avoid spreading germs.    Wash your hands and face often. Use soap and water.    Caregivers in these groups are at risk for severe illness due to COVID-19:  ? People 65 years and older  ? People who live in a nursing home or long-term care facility  ? People with chronic disease (lung, heart, cancer, diabetes, kidney, liver, immunologic)  ? People who have a weakened immune system, including those who:    Are in cancer treatment    Take medicine that weakens the immune system, such as corticosteroids    Had a bone marrow or organ transplant    Have an immune deficiency    Have poorly controlled HIV or " AIDS    Are obese (body mass index of 40 or higher)    Smoke regularly    Caregivers should wear gloves while washing dishes, handling laundry and cleaning bedrooms and bathrooms.    Use caution when washing and drying laundry: Don't shake dirty laundry and use the warmest water setting that you can.    For more tips on managing your health at home, go to www.cdc.gov/coronavirus/2019-ncov/downloads/10Things.pdf.  How can I take care of myself at home?  1. Get lots of rest. Drink extra fluids (unless a doctor has told you not to).  2. Take Tylenol (acetaminophen) for fever or pain. If you have liver or kidney problems, ask your family doctor if it's okay to take Tylenol.     Adults can take either:  ? 650 mg (two 325 mg pills) every 4 to 6 hours, or   ? 1,000 mg (two 500 mg pills) every 8 hours as needed.  ? Note: Don't take more than 3,000 mg in one day. Acetaminophen is found in many medicines (both prescribed and over-the-counter medicines). Read all labels to be sure you don't take too much.   For children, check the Tylenol bottle for the right dose. The dose is based on the child's age or weight.  3. If you have other health problems (like cancer, heart failure, an organ transplant or severe kidney disease): Call your specialty clinic if you don't feel better in the next 2 days.  4. Know when to call 911. Emergency warning signs include:  ? Trouble breathing or shortness of breath  ? Pain or pressure in the chest that doesn't go away  ? Feeling confused like you haven't felt before, or not being able to wake up  ? Bluish-colored lips or face  5. Your doctor may have prescribed a blood thinner medicine. Follow their instructions.  Where can I get more information?     [facility] - About COVID-19:   [email or web address]    CDC - What to Do If You're Sick: www.cdc.gov/coronavirus/2019-ncov/about/steps-when-sick.html    CDC - Ending Home Isolation:  www.cdc.gov/coronavirus/2019-ncov/hcp/disposition-in-home-patients.html    CDC - Caring for Someone: www.cdc.gov/coronavirus/2019-ncov/if-you-are-sick/care-for-someone.html    [facility] - Interim Guidance for Hospital Discharge to Home: www.[facility].[place]/diseases/coronavirus/hcp/hospdischarge.pdf    [facility] clinical trials (COVID-19 research studies): clinicalaffairs.[facility]-clinical-trials    Below are the COVID-19 hotlines at the [place] Department of Health ([facility]). Interpreters are available.  ? For health questions: Call 913-755-1032 or 1-954.954.7604 (7 a.m. to 7 p.m.)  ? For questions about schools and childcare: Call 515-681-8652 or 1-100.621.5487 (7 a.m. to 7 p.m.)    For informational purposes only. Not to replace the advice of your health care provider. Clinically reviewed by the Infection Prevention Team.Copyright   2020 [facility]. All rights reserved. [employer] [id number] - 06/20.

## 2020-08-07 NOTE — NURSING NOTE
Patient's mother tested positive for COVID. She has been around her mother.  Chitra Alvarez LPN ....................  8/7/2020   9:31 AM

## 2020-08-07 NOTE — PROGRESS NOTES
"Ita Poole is a 30 year old female who is being evaluated via a billable telephone visit.      The patient has been notified of following:     \"This telephone visit will be conducted via a call between you and your physician/provider. We have found that certain health care needs can be provided without the need for a physical exam.  This service lets us provide the care you need with a short phone conversation.  If a prescription is necessary we can send it directly to your pharmacy.  If lab work is needed we can place an order for that and you can then stop by our lab to have the test done at a later time.    Telephone visits are billed at different rates depending on your insurance coverage. During this emergency period, for some insurers they may be billed the same as an in-person visit.  Please reach out to your insurance provider with any questions.    If during the course of the call the physician/provider feels a telephone visit is not appropriate, you will not be charged for this service.\"    Patient has given verbal consent for Telephone visit?  Yes    What phone number would you like to be contacted at? [phone]    How would you like to obtain your AVS? Mail a copy    Subjective     Ita Poole is a 30 year old female who presents via phone visit today for the following health issues:    HPI    Patient is contacted via telephone for consideration of COVID-19 testing. Patient's mother works at [employer] and she tested positive for COVID. Patient and her son have been in prolonged close contact with her mother for several days the past few weeks. Ita has developed a slight cough but she feels it is related to her smoking. Has moderate asthma that has been well controlled with albuterol inhaler, Flonase, and duonebs. Denies fever/chills, sore throat, difficulties breathing, muscle or body aches, headache, GI symptoms, rash.       PAST MEDICAL HISTORY:   Past Medical History:   Diagnosis Date     " Allergic rhinitis     No Comments Provided     Celiac disease     No Comments Provided     Mass of right index finger at the PIP joint 2018     S/P excision of mass right index finger 2018     Uncomplicated asthma     No Comments Provided       PAST SURGICAL HISTORY:   Past Surgical History:   Procedure Laterality Date      SECTION      ,4/10/14, Section     COLONOSCOPY      age 10     ESOPHAGOSCOPY, GASTROSCOPY, DUODENOSCOPY (EGD), COMBINED      [id number],ESOPHAGOGASTRODUODENOSCOPY,age 10, small bowel bx: celiac disease     EXCISE MASS UPPER EXTREMITY Right 2018    Procedure: EXCISE MASS UPPER EXTREMITY;  Right Index Finger Mass Excision;  Surgeon: Nate Allen DO;  Location: [facility] OR     LAPAROSCOPIC CHOLECYSTECTOMY      9/22/15,Cholecystectomy,Laparoscopic     S/P EXCISION OF MASS RIGHT INDEX FINGER Right 2018       FAMILY HISTORY:   Family History   Problem Relation Age of Onset     Asthma Mother         Asthma     Allergy (Severe) Mother         Allergies     Asthma Father         Asthma     Heart Disease Father 43        Heart Disease, of MI     Diabetes Father         Diabetes     Asthma Maternal Grandmother         Asthma     Diabetes Paternal Grandfather         Diabetes       SOCIAL HISTORY:   Social History     Tobacco Use     Smoking status: Never Smoker     Smokeless tobacco: Never Used     Tobacco comment: Quit smoking: passive exposure   Substance Use Topics     Alcohol use: Yes     Alcohol/week: 0.0 standard drinks     Comment: social         Allergies   Allergen Reactions     Amoxicillin-Pot Clavulanate Hives     Augmentin Diarrhea     Yeast Infection in colon     Azithromycin Nausea     Azithromycin      Can't take because it contains wheat     Codeine      constipation     Gluten      Gluten Meal      Other reaction(s): Constipation  Celiac disease, abdominal pain     Current Outpatient Medications   Medication     albuterol (PROAIR HFA/PROVENTIL  HFA/VENTOLIN HFA) 108 (90 BASE) MCG/ACT Inhaler     cyclobenzaprine (FLEXERIL) 10 MG tablet     EPI E-Z PEN LYNETTE 1:1000  IJ     fluticasone (FLONASE) 50 MCG/ACT nasal spray     ibuprofen (ADVIL/MOTRIN) 800 MG tablet     ipratropium - albuterol 0.5 mg/2.5 mg/3 mL (DUONEB) 0.5-2.5 (3) MG/3ML neb solution     risperiDONE (RISPERDAL) 0.5 MG tablet     triamcinolone (KENALOG) 0.1 % external ointment     No current facility-administered medications for this visit.          Reviewed and updated as needed this visit by Provider         Review of Systems   Constitutional, HEENT, cardiovascular, pulmonary, gi and gu systems are negative, except as otherwise noted.       Objective   Reported vitals:  There were no vitals taken for this visit.   healthy, alert and no distress  PSYCH: Alert and oriented times 3; coherent speech, normal   rate and volume, able to articulate logical thoughts, able   to abstract reason, no tangential thoughts, no hallucinations   or delusions  Her affect is normal  RESP: No cough, no audible wheezing, able to talk in full sentences  Remainder of exam unable to be completed due to telephone visits            Assessment/Plan:  1. Exposure to COVID-19 virus    2. COVID-19 ruled out      Patient meets criteria for COVID-19 testing. They are informed to self quarantine until results are available. They have been provided information to complete curbside testing. Will notify with results. If positive, patient is to self-quarantine at home for 14 days. If symptoms worsen, they were told they need to have further re-evaluation. Encouraged symptomatic management.         Phone call duration:  5 minutes    Mariia Rojas PA-C

## 2020-08-08 LAB
SARS-COV-2 RNA SPEC QL NAA+PROBE: NOT DETECTED
SPECIMEN SOURCE: NORMAL

## 2020-08-18 ENCOUNTER — OFFICE VISIT (OUTPATIENT)
Dept: FAMILY MEDICINE | Facility: OTHER | Age: 30
End: 2020-08-18
Attending: FAMILY MEDICINE
Payer: COMMERCIAL

## 2020-08-18 VITALS
WEIGHT: 253 LBS | RESPIRATION RATE: 16 BRPM | BODY MASS INDEX: 44.83 KG/M2 | HEART RATE: 102 BPM | SYSTOLIC BLOOD PRESSURE: 134 MMHG | DIASTOLIC BLOOD PRESSURE: 88 MMHG | TEMPERATURE: 96.6 F | HEIGHT: 63 IN | OXYGEN SATURATION: 97 %

## 2020-08-18 DIAGNOSIS — M54.9 UPPER BACK PAIN: ICD-10-CM

## 2020-08-18 DIAGNOSIS — B37.2 YEAST DERMATITIS: ICD-10-CM

## 2020-08-18 DIAGNOSIS — N62 LARGE BREASTS: Primary | ICD-10-CM

## 2020-08-18 DIAGNOSIS — K13.0 CHEILITIS: ICD-10-CM

## 2020-08-18 PROCEDURE — 99213 OFFICE O/P EST LOW 20 MIN: CPT | Performed by: FAMILY MEDICINE

## 2020-08-18 PROCEDURE — G0463 HOSPITAL OUTPT CLINIC VISIT: HCPCS

## 2020-08-18 RX ORDER — ZOLPIDEM TARTRATE 5 MG/1
5 TABLET ORAL
COMMUNITY
End: 2021-10-24

## 2020-08-18 RX ORDER — KETOCONAZOLE 20 MG/G
CREAM TOPICAL DAILY
Qty: 30 G | Refills: 3 | Status: SHIPPED | OUTPATIENT
Start: 2020-08-18 | End: 2021-05-05

## 2020-08-18 ASSESSMENT — PATIENT HEALTH QUESTIONNAIRE - PHQ9: SUM OF ALL RESPONSES TO PHQ QUESTIONS 1-9: 4

## 2020-08-18 ASSESSMENT — MIFFLIN-ST. JEOR: SCORE: 1841.73

## 2020-08-18 ASSESSMENT — PAIN SCALES - GENERAL: PAINLEVEL: NO PAIN (0)

## 2020-08-18 NOTE — NURSING NOTE
"Chief Complaint   Patient presents with     Consult     talk about a breast reduction       Initial /88   Pulse 102   Temp 96.6  F (35.9  C) (Temporal)   Resp 16   Ht 1.6 m (5' 3\")   Wt 114.8 kg (253 lb)   LMP 08/12/2020   SpO2 97%   Breastfeeding No   BMI 44.82 kg/m   Estimated body mass index is 44.82 kg/m  as calculated from the following:    Height as of this encounter: 1.6 m (5' 3\").    Weight as of this encounter: 114.8 kg (253 lb).  Medication Reconciliation: complete    Marilu Jacobs LPN  "

## 2020-08-18 NOTE — PROGRESS NOTES
"Nursing Notes:   Marilu Jacobs LPN  8/18/2020 10:13 AM  Signed  Chief Complaint   Patient presents with     Consult     talk about a breast reduction       Initial /88   Pulse 102   Temp 96.6  F (35.9  C) (Temporal)   Resp 16   Ht 1.6 m (5' 3\")   Wt 114.8 kg (253 lb)   LMP 08/12/2020   SpO2 97%   Breastfeeding No   BMI 44.82 kg/m   Estimated body mass index is 44.82 kg/m  as calculated from the following:    Height as of this encounter: 1.6 m (5' 3\").    Weight as of this encounter: 114.8 kg (253 lb).  Medication Reconciliation: complete    Marilu Jacobs LPN    SUBJECTIVE:  Ita Poole  is a 30 year old female who comes in today to discuss the possibility of referral for breast reduction. She sees Dr. Owen and has upper and lower back pain. She will get monilial intertrigo. She is 42 J bra size and has to order them online.  She has trouble with digging of underwire.  She is done having children.  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 She has grooving of her straps from her bra. She has constant myofascial knots in her shoulders.     She has been going to [employer] now and has an 18 month membership.     She has a history of snoring and sleep apnea with tonsillar hypertrophy.  She saw Dr. Asencio who recommended a full 3-month trial of CPAP to see if it alleviates her symptoms and if not, she might be a candidate for tonsillectomy and UPP.    Still having trouble with peeling lips.  The topical steroid cream has not really been terribly helpful.  He is interested in referral to dermatology as we discussed previously.  She has not been to the dentist recently.    Past Medical, Family, and Social History reviewed and updated as noted below.   ROS is negative except as noted above       Allergies   Allergen Reactions     Amoxicillin-Pot Clavulanate Hives     Augmentin Diarrhea     Yeast Infection in colon     Azithromycin Nausea     Azithromycin      Can't take " because it contains wheat     Codeine      constipation     Gluten      Gluten Meal      Other reaction(s): Constipation  Celiac disease, abdominal pain   ,   Family History   Problem Relation Age of Onset     Asthma Mother         Asthma     Allergy (Severe) Mother         Allergies     Asthma Father         Asthma     Heart Disease Father 43        Heart Disease, of MI     Diabetes Father         Diabetes     Asthma Maternal Grandmother         Asthma     Diabetes Paternal Grandfather         Diabetes   ,   Current Outpatient Medications   Medication     albuterol (PROAIR HFA/PROVENTIL HFA/VENTOLIN HFA) 108 (90 BASE) MCG/ACT Inhaler     cyclobenzaprine (FLEXERIL) 10 MG tablet     EPI E-Z PEN LYNETTE 1:1000  IJ     fluticasone (FLONASE) 50 MCG/ACT nasal spray     ibuprofen (ADVIL/MOTRIN) 800 MG tablet     ipratropium - albuterol 0.5 mg/2.5 mg/3 mL (DUONEB) 0.5-2.5 (3) MG/3ML neb solution     ketoconazole (NIZORAL) 2 % external cream     risperiDONE (RISPERDAL) 0.5 MG tablet     triamcinolone (KENALOG) 0.1 % external ointment     zolpidem (AMBIEN) 5 MG tablet     No current facility-administered medications for this visit.    ,   Past Medical History:   Diagnosis Date     Allergic rhinitis     No Comments Provided     Celiac disease     No Comments Provided     Mass of right index finger at the PIP joint 2018     S/P excision of mass right index finger 2018     Uncomplicated asthma     No Comments Provided   ,   Patient Active Problem List    Diagnosis Date Noted     S/P excision of mass right index finger 2018     Priority: Medium     Cold thyroid nodule 2017     Priority: Medium     Obesity 2017     Priority: Medium     Irregular menses 2016     Priority: Medium     H/O  section 2014     Priority: Medium     Major depressive disorder, recurrent episode, moderate (H) 2013     Priority: Medium     Overview:   Dx added per office visit on 12 with   Minnie Baltazar RN, Clinical , [facility], 2013 5:02 PM       Vitamin D deficiency 2007     Priority: Medium     Problem list name updated by automated process. Provider to review       Angioedema 2006     Priority: Medium     Problem list name updated by automated process. Provider to review       Celiac disease 2006     Priority: Medium     Abdominal pain, generalized 2006     Priority: Medium     C.diff, admitted for obs. X 24 hrs. Flagyl started as outpt. Laura Moffett MD         Depressive disorder, not elsewhere classified 10/28/2005     Priority: Medium     Followed at [facility], meds by Marnie Marsh RN       Moderate persistent asthma 2005     Priority: Medium     aap completed         Allergic rhinitis 2005     Priority: Medium     Problem list name updated by automated process. Provider to review       Migraine 2005     Priority: Medium     Problem list name updated by automated process. Provider to review     ,   Past Surgical History:   Procedure Laterality Date      SECTION      ,4/10/14, Section     COLONOSCOPY      age 10     ESOPHAGOSCOPY, GASTROSCOPY, DUODENOSCOPY (EGD), COMBINED      [id number],ESOPHAGOGASTRODUODENOSCOPY,age 10, small bowel bx: celiac disease     EXCISE MASS UPPER EXTREMITY Right 2018    Procedure: EXCISE MASS UPPER EXTREMITY;  Right Index Finger Mass Excision;  Surgeon: Nate Allen DO;  Location: [facility] OR     LAPAROSCOPIC CHOLECYSTECTOMY      9/22/15,Cholecystectomy,Laparoscopic     S/P EXCISION OF MASS RIGHT INDEX FINGER Right 2018    and   Social History     Tobacco Use     Smoking status: Never Smoker     Smokeless tobacco: Never Used     Tobacco comment: Quit smoking: passive exposure   Substance Use Topics     Alcohol use: Yes     Alcohol/week: 0.0 standard drinks     Comment: social      OBJECTIVE:  /88   Pulse 102   Temp 96.6  " F (35.9  C) (Temporal)   Resp 16   Ht 1.6 m (5' 3\")   Wt 114.8 kg (253 lb)   LMP 08/12/2020   SpO2 97%   Breastfeeding No   BMI 44.82 kg/m     EXAM:  Alert and cooperative, no distress.  He has large pendulous breasts without redness or tenderness.  Currently, she has no rash.  She has no palpable breast masses or adenopathy.  Continues to have trouble with cheilitis.    ASSESSMENT/Plan :    Ita was seen today for consult.    Diagnoses and all orders for this visit:    Large breasts  -     PLASTIC SURGERY REFERRAL    Yeast dermatitis  -     ketoconazole (NIZORAL) 2 % external cream; Apply topically daily  -     PLASTIC SURGERY REFERRAL    Cheilitis  -     DERMATOLOGY REFERRAL    Upper back pain  -     PLASTIC SURGERY REFERRAL      Congratulated on her commitment to weight loss and improved fitness.  That is certainly crucial in her overall health.  Because of the fact that she has upper back pain with grooving of her bra straps and recurrent yeast dermatitis with her large breast size, she likely would be a candidate for consideration of breast reduction surgery.  Referred to plastic surgery for evaluation and consultation.    Trial of Nizoral cream both for her intertrigo and also could try it for her cheilitis as it may have a fungal component.  Dermatology referral was sent as well.    A total of 15 minutes was spent with the patient, greater than 50% of the time was spent in counseling/discussion of the aforementioned concerns.     Will Reynoso MD            "

## 2020-08-19 ASSESSMENT — ASTHMA QUESTIONNAIRES: ACT_TOTALSCORE: 25

## 2020-09-05 NOTE — INTERVAL H&P NOTE
History and Physical Update    The history and physical has been reviewed and the patient's right index finger has been examined.  There are no interim changes to the patient's history or physical condition.  She is ready to proceed with planned procedure.  She understands the risks and benefits and once again these where outlined.    Nate Allen DO  Orthopedic Surgeon    2/28/2018 9:15 AM        
normal...

## 2020-10-16 ENCOUNTER — TRANSFERRED RECORDS (OUTPATIENT)
Dept: HEALTH INFORMATION MANAGEMENT | Facility: OTHER | Age: 30
End: 2020-10-16

## 2020-10-21 ENCOUNTER — MYC REFILL (OUTPATIENT)
Dept: FAMILY MEDICINE | Facility: OTHER | Age: 30
End: 2020-10-21

## 2020-10-21 DIAGNOSIS — J30.0 VASOMOTOR RHINITIS: ICD-10-CM

## 2020-10-21 DIAGNOSIS — S30.0XXA CONTUSION, BUTTOCK, INITIAL ENCOUNTER: ICD-10-CM

## 2020-10-21 DIAGNOSIS — M79.18 RIGHT BUTTOCK PAIN: ICD-10-CM

## 2020-10-21 RX ORDER — IBUPROFEN 800 MG/1
TABLET, FILM COATED ORAL
Qty: 90 TABLET | Refills: 11 | Status: CANCELLED | OUTPATIENT
Start: 2020-10-21

## 2020-10-21 RX ORDER — FLUTICASONE PROPIONATE 50 MCG
2 SPRAY, SUSPENSION (ML) NASAL DAILY
Qty: 18 ML | Refills: 11 | Status: CANCELLED | OUTPATIENT
Start: 2020-10-21

## 2020-10-22 NOTE — TELEPHONE ENCOUNTER
ibuprofen (ADVIL/MOTRIN) 800 MG tablet 90 tablet 11 2/25/2020  No   Sig: TAKE 1 TABLET BY MOUTH 3 TIMES DAILY IF NEEDED FOR PAIN     fluticasone (FLONASE) 50 MCG/ACT nasal spray 18 mL 11 2/5/2020  --   Sig - Route: Spray 2 sprays into both nostrils daily - Both Nostrils     Refilled to [facility]  Contacted pharmacy and they have refill for patient and will refill    Dalila Townsend RN on 10/22/2020 at 3:34 PM

## 2020-11-20 ENCOUNTER — OFFICE VISIT (OUTPATIENT)
Dept: FAMILY MEDICINE | Facility: OTHER | Age: 30
End: 2020-11-20
Attending: FAMILY MEDICINE
Payer: COMMERCIAL

## 2020-11-20 VITALS
BODY MASS INDEX: 43.52 KG/M2 | HEIGHT: 63 IN | RESPIRATION RATE: 16 BRPM | WEIGHT: 245.6 LBS | SYSTOLIC BLOOD PRESSURE: 138 MMHG | DIASTOLIC BLOOD PRESSURE: 68 MMHG | OXYGEN SATURATION: 97 % | TEMPERATURE: 98 F | HEART RATE: 104 BPM

## 2020-11-20 DIAGNOSIS — Z01.818 PREOP GENERAL PHYSICAL EXAM: Primary | ICD-10-CM

## 2020-11-20 DIAGNOSIS — N62 LARGE BREASTS: ICD-10-CM

## 2020-11-20 DIAGNOSIS — J45.40 MODERATE PERSISTENT ASTHMA WITHOUT COMPLICATION: ICD-10-CM

## 2020-11-20 DIAGNOSIS — K90.0 CELIAC DISEASE: ICD-10-CM

## 2020-11-20 DIAGNOSIS — E55.9 VITAMIN D DEFICIENCY: ICD-10-CM

## 2020-11-20 DIAGNOSIS — F33.1 MAJOR DEPRESSIVE DISORDER, RECURRENT EPISODE, MODERATE (H): ICD-10-CM

## 2020-11-20 DIAGNOSIS — E66.813 CLASS 3 SEVERE OBESITY DUE TO EXCESS CALORIES WITHOUT SERIOUS COMORBIDITY WITH BODY MASS INDEX (BMI) OF 40.0 TO 44.9 IN ADULT (H): ICD-10-CM

## 2020-11-20 DIAGNOSIS — E66.01 CLASS 3 SEVERE OBESITY DUE TO EXCESS CALORIES WITHOUT SERIOUS COMORBIDITY WITH BODY MASS INDEX (BMI) OF 40.0 TO 44.9 IN ADULT (H): ICD-10-CM

## 2020-11-20 DIAGNOSIS — E04.1 COLD THYROID NODULE: ICD-10-CM

## 2020-11-20 DIAGNOSIS — Z23 NEEDS FLU SHOT: ICD-10-CM

## 2020-11-20 DIAGNOSIS — G47.33 OSA (OBSTRUCTIVE SLEEP APNEA): ICD-10-CM

## 2020-11-20 LAB
ALBUMIN SERPL-MCNC: 4.4 G/DL (ref 3.5–5.7)
ALBUMIN UR-MCNC: NEGATIVE MG/DL
ALP SERPL-CCNC: 56 U/L (ref 34–104)
ALT SERPL W P-5'-P-CCNC: 16 U/L (ref 7–52)
ANION GAP SERPL CALCULATED.3IONS-SCNC: 7 MMOL/L (ref 3–14)
APPEARANCE UR: CLEAR
AST SERPL W P-5'-P-CCNC: 15 U/L (ref 13–39)
BACTERIA #/AREA URNS HPF: ABNORMAL /HPF
BASOPHILS # BLD AUTO: 0.1 10E9/L (ref 0–0.2)
BASOPHILS NFR BLD AUTO: 0.8 %
BILIRUB SERPL-MCNC: 0.3 MG/DL (ref 0.3–1)
BILIRUB UR QL STRIP: NEGATIVE
BUN SERPL-MCNC: 10 MG/DL (ref 7–25)
CALCIUM SERPL-MCNC: 9.6 MG/DL (ref 8.6–10.3)
CHLORIDE SERPL-SCNC: 106 MMOL/L (ref 98–107)
CO2 SERPL-SCNC: 25 MMOL/L (ref 21–31)
COLOR UR AUTO: YELLOW
CREAT SERPL-MCNC: 0.96 MG/DL (ref 0.6–1.2)
DEPRECATED CALCIDIOL+CALCIFEROL SERPL-MC: >100 NG/ML
DIFFERENTIAL METHOD BLD: NORMAL
EOSINOPHIL # BLD AUTO: 0.1 10E9/L (ref 0–0.7)
EOSINOPHIL NFR BLD AUTO: 1.1 %
ERYTHROCYTE [DISTWIDTH] IN BLOOD BY AUTOMATED COUNT: 11.9 % (ref 10–15)
GFR SERPL CREATININE-BSD FRML MDRD: 69 ML/MIN/{1.73_M2}
GLUCOSE SERPL-MCNC: 97 MG/DL (ref 70–105)
GLUCOSE UR STRIP-MCNC: NEGATIVE MG/DL
HCG UR QL: NEGATIVE
HCT VFR BLD AUTO: 42.8 % (ref 35–47)
HGB BLD-MCNC: 14.5 G/DL (ref 11.7–15.7)
HGB UR QL STRIP: ABNORMAL
IMM GRANULOCYTES # BLD: 0.1 10E9/L (ref 0–0.4)
IMM GRANULOCYTES NFR BLD: 0.6 %
KETONES UR STRIP-MCNC: NEGATIVE MG/DL
LEUKOCYTE ESTERASE UR QL STRIP: NEGATIVE
LYMPHOCYTES # BLD AUTO: 2.1 10E9/L (ref 0.8–5.3)
LYMPHOCYTES NFR BLD AUTO: 27.1 %
MCH RBC QN AUTO: 29.6 PG (ref 26.5–33)
MCHC RBC AUTO-ENTMCNC: 33.9 G/DL (ref 31.5–36.5)
MCV RBC AUTO: 87 FL (ref 78–100)
MONOCYTES # BLD AUTO: 0.7 10E9/L (ref 0–1.3)
MONOCYTES NFR BLD AUTO: 8.9 %
MUCOUS THREADS #/AREA URNS LPF: PRESENT /LPF
NEUTROPHILS # BLD AUTO: 4.8 10E9/L (ref 1.6–8.3)
NEUTROPHILS NFR BLD AUTO: 61.5 %
NITRATE UR QL: NEGATIVE
PH UR STRIP: 5 PH (ref 5–7)
PLATELET # BLD AUTO: 247 10E9/L (ref 150–450)
POTASSIUM SERPL-SCNC: 3.9 MMOL/L (ref 3.5–5.1)
PROT SERPL-MCNC: 7.9 G/DL (ref 6.4–8.9)
RBC # BLD AUTO: 4.9 10E12/L (ref 3.8–5.2)
RBC #/AREA URNS AUTO: 49 /HPF (ref 0–2)
SODIUM SERPL-SCNC: 138 MMOL/L (ref 134–144)
SOURCE: ABNORMAL
SP GR UR STRIP: 1.01 (ref 1–1.03)
T4 FREE SERPL-MCNC: 1.06 NG/DL (ref 0.6–1.6)
TSH SERPL DL<=0.05 MIU/L-ACNC: 1.85 IU/ML (ref 0.34–5.6)
UROBILINOGEN UR STRIP-MCNC: NORMAL MG/DL (ref 0–2)
WBC # BLD AUTO: 7.8 10E9/L (ref 4–11)
WBC #/AREA URNS AUTO: 2 /HPF (ref 0–5)

## 2020-11-20 PROCEDURE — G0463 HOSPITAL OUTPT CLINIC VISIT: HCPCS | Mod: 25

## 2020-11-20 PROCEDURE — 81001 URINALYSIS AUTO W/SCOPE: CPT | Mod: ZL | Performed by: FAMILY MEDICINE

## 2020-11-20 PROCEDURE — 80053 COMPREHEN METABOLIC PANEL: CPT | Mod: ZL | Performed by: FAMILY MEDICINE

## 2020-11-20 PROCEDURE — G0463 HOSPITAL OUTPT CLINIC VISIT: HCPCS

## 2020-11-20 PROCEDURE — 84439 ASSAY OF FREE THYROXINE: CPT | Mod: ZL | Performed by: FAMILY MEDICINE

## 2020-11-20 PROCEDURE — 99214 OFFICE O/P EST MOD 30 MIN: CPT | Performed by: FAMILY MEDICINE

## 2020-11-20 PROCEDURE — 82306 VITAMIN D 25 HYDROXY: CPT | Mod: ZL | Performed by: FAMILY MEDICINE

## 2020-11-20 PROCEDURE — 84443 ASSAY THYROID STIM HORMONE: CPT | Mod: ZL | Performed by: FAMILY MEDICINE

## 2020-11-20 PROCEDURE — 36415 COLL VENOUS BLD VENIPUNCTURE: CPT | Mod: ZL | Performed by: FAMILY MEDICINE

## 2020-11-20 PROCEDURE — 81025 URINE PREGNANCY TEST: CPT | Mod: ZL | Performed by: FAMILY MEDICINE

## 2020-11-20 PROCEDURE — 85025 COMPLETE CBC W/AUTO DIFF WBC: CPT | Mod: ZL | Performed by: FAMILY MEDICINE

## 2020-11-20 PROCEDURE — G0008 ADMIN INFLUENZA VIRUS VAC: HCPCS

## 2020-11-20 RX ORDER — BUPROPION HYDROCHLORIDE 100 MG/1
100 TABLET, EXTENDED RELEASE ORAL EVERY MORNING
COMMUNITY
Start: 2020-11-11 | End: 2023-03-14 | Stop reason: DRUGHIGH

## 2020-11-20 ASSESSMENT — MIFFLIN-ST. JEOR: SCORE: 1800.22

## 2020-11-20 ASSESSMENT — PATIENT HEALTH QUESTIONNAIRE - PHQ9: SUM OF ALL RESPONSES TO PHQ QUESTIONS 1-9: 3

## 2020-11-20 ASSESSMENT — PAIN SCALES - GENERAL: PAINLEVEL: NO PAIN (0)

## 2020-11-20 NOTE — NURSING NOTE
Patient is here for a pre-op exam.     Patient's last menstrual period was 11/19/2020 (exact date).  Medication Reconciliation: complete    Kirti Andersen LPN  11/20/2020 10:24 AM

## 2020-11-20 NOTE — PROGRESS NOTES
[facility] AND [facility]  [address]  [place] [address]  Phone: [phone]  Fax: [phone]  Primary Provider: Vahe Reynoso  Pre-op Performing Provider: VAHE REYNOSO    PREOPERATIVE EVALUATION:  Today's date: 11/20/2020    Ita Poole is a 30 year old female who presents for a preoperative evaluation.    Surgical Information:  Surgery/Procedure: Breast Reduction   Surgery Location: [facility]   Surgeon: Dr Jordan  Surgery Date: 12/7/20  Time of Surgery: TBD  Where patient plans to recover: At home with family  Fax number for surgical facility:     Type of Anesthesia Anticipated: General    Subjective     HPI related to upcoming procedure: painful shoulders, grooving, rashes from large breast tissue.     She is approved for medical marijuana.  She has a Covid test scheduled for December 2 here.     Preop Questions 11/20/2020   1. Have you ever had a heart attack or stroke? No   2. Have you ever had surgery on your heart or blood vessels, such as a stent placement, a coronary artery bypass, or surgery on an artery in your head, neck, heart, or legs? No   3. Do you have chest pain with activity? No   4. Do you have a history of  heart failure? No   5. Do you currently have a cold, bronchitis or symptoms of other infection? No   6. Do you have a cough, shortness of breath, or wheezing? No   7. Do you or anyone in your family have previous history of blood clots? No   8. Do you or does anyone in your family have a serious bleeding problem such as prolonged bleeding following surgeries or cuts? No   9. Have you ever had problems with anemia or been told to take iron pills? No   10. Have you had any abnormal blood loss such as black, tarry or bloody stools, or abnormal vaginal bleeding? No   11. Have you ever had a blood transfusion? No   12. Are you willing to have a blood transfusion if it is medically needed before, during, or after your surgery? Yes   13. Have  you or any of your relatives ever had problems with anesthesia? No   14. Do you have sleep apnea, excessive snoring or daytime drowsiness? YES - mild sleep apnea but didn't tolerate CPAP.   14a. Do you have a CPAP machine? No   15. Do you have any artifical heart valves or other implanted medical devices like a pacemaker, defibrillator, or continuous glucose monitor? No   16. Do you have artificial joints? No   17. Are you allergic to latex? No   18. Is there any chance that you may be pregnant? No       Health Care Directive:  Patient does not have a Health Care Directive or Living Will:     Preoperative Review of :   reviewed - controlled substances reflected in medication list.          Review of Systems  ROS is negative except as noted above       Patient Active Problem List    Diagnosis Date Noted     S/P excision of mass right index finger 2018     Priority: Medium     Cold thyroid nodule 2017     Priority: Medium     Obesity 2017     Priority: Medium     Irregular menses 2016     Priority: Medium     H/O  section 2014     Priority: Medium     Major depressive disorder, recurrent episode, moderate (H) 2013     Priority: Medium     Overview:   Dx added per office visit on 12 with Dr. Burch.  Fátima Baltazar RN, Clinical , [facility], 2013 5:02 PM       Vitamin D deficiency 2007     Priority: Medium     Problem list name updated by automated process. Provider to review       Angioedema 2006     Priority: Medium     Problem list name updated by automated process. Provider to review       Celiac disease 2006     Priority: Medium     Abdominal pain, generalized 2006     Priority: Medium     C.diff, admitted for obs. X 24 hrs. Flagyl started as outpt. Laura Moffett MD         Depressive disorder, not elsewhere classified 10/28/2005     Priority: Medium     Followed at [facility], meds  by Marnie Marsh RN       Moderate persistent asthma 2005     Priority: Medium     aap completed         Allergic rhinitis 2005     Priority: Medium     Problem list name updated by automated process. Provider to review       Migraine 2005     Priority: Medium     Problem list name updated by automated process. Provider to review        Past Medical History:   Diagnosis Date     Allergic rhinitis     No Comments Provided     Celiac disease     No Comments Provided     Mass of right index finger at the PIP joint 2018     S/P excision of mass right index finger 2018     Uncomplicated asthma     No Comments Provided     Past Surgical History:   Procedure Laterality Date      SECTION      ,4/10/14, Section     COLONOSCOPY      age 10     ESOPHAGOSCOPY, GASTROSCOPY, DUODENOSCOPY (EGD), COMBINED      [id number],ESOPHAGOGASTRODUODENOSCOPY,age 10, small bowel bx: celiac disease     EXCISE MASS UPPER EXTREMITY Right 2018    Procedure: EXCISE MASS UPPER EXTREMITY;  Right Index Finger Mass Excision;  Surgeon: Nate Allen DO;  Location: [facility] OR     LAPAROSCOPIC CHOLECYSTECTOMY      9/22/15,Cholecystectomy,Laparoscopic     S/P EXCISION OF MASS RIGHT INDEX FINGER Right 2018     Current Outpatient Medications   Medication Sig Dispense Refill     albuterol (PROAIR HFA/PROVENTIL HFA/VENTOLIN HFA) 108 (90 BASE) MCG/ACT Inhaler Inhale 2 puffs into the lungs every 4 hours as needed for wheezing       buPROPion (WELLBUTRIN SR) 100 MG 12 hr tablet Take 100 mg by mouth every morning       cyclobenzaprine (FLEXERIL) 10 MG tablet TAKE 1 TABLET BY MOUTH 3 TIMES DAILY IF NEEDED FOR MUSCLE SPASM. 30 tablet 5     EPI E-Z PEN LYNETTE 1:1000  IJ 1 TIME ONLY 1 0     fluticasone (FLONASE) 50 MCG/ACT nasal spray Spray 2 sprays into both nostrils daily 18 mL 11     ibuprofen (ADVIL/MOTRIN) 800 MG tablet TAKE 1 TABLET BY MOUTH 3 TIMES DAILY IF NEEDED FOR PAIN. 90 tablet 11     ketoconazole  "(NIZORAL) 2 % external cream Apply topically daily 30 g 3     medical cannabis (Patient's own supply) See Admin Instructions (The purpose of this order is to document that the patient reports taking medical cannabis.  This is not a prescription, and is not used to certify that the patient has a qualifying medical condition.)       risperiDONE (RISPERDAL) 0.5 MG tablet TAKE 1 TABLET (0.5 MG) BY MOUTH TWICE A DAY AT BEDTIME AND AS NEEDED 180 tablet 3     zolpidem (AMBIEN) 5 MG tablet Take 5 mg by mouth nightly as needed for sleep       omeprazole (PRILOSEC) 20 MG DR capsule TAKE 1 CAPSULE (20 MG) BY MOUTH DAILY         Allergies   Allergen Reactions     Amoxicillin-Pot Clavulanate Hives     Augmentin Diarrhea     Yeast Infection in colon     Azithromycin Nausea     Azithromycin      Can't take because it contains wheat     Codeine      constipation     Gluten      Gluten Meal      Other reaction(s): Constipation  Celiac disease, abdominal pain        Social History     Tobacco Use     Smoking status: Never Smoker     Smokeless tobacco: Never Used     Tobacco comment: Quit smoking: passive exposure   Substance Use Topics     Alcohol use: Yes     Alcohol/week: 0.0 standard drinks     Comment: social      Family History   Problem Relation Age of Onset     Asthma Mother         Asthma     Allergy (Severe) Mother         Allergies     Asthma Father         Asthma     Heart Disease Father 43        Heart Disease, of MI     Diabetes Father         Diabetes     Asthma Maternal Grandmother         Asthma     Diabetes Paternal Grandfather         Diabetes     History   Drug Use     Types: Marijuana     Comment: medical          Objective     /68 (BP Location: Right arm, Patient Position: Sitting, Cuff Size: Adult Large)   Pulse 104   Temp 98  F (36.7  C) (Tympanic)   Resp 16   Ht 1.588 m (5' 2.5\")   Wt 111.4 kg (245 lb 9.6 oz)   LMP 2020 (Exact Date)   SpO2 97%   Breastfeeding No   BMI 44.20 kg/m  "     Physical Exam  General Appearance: Pleasant, alert, appropriate appearance for age. No acute distress  Head Exam: Normal. Normocephalic, atraumatic.  Eye Exam: PERRLA, EOMI, conjunctivae, sclerae normal.  Ear Exam: Normal TM's bilaterally. Normal auditory canals and external ears. Non-tender.  Nose Exam: Normal external nose, mucus membranes, and septum.  OroPharynx Exam:  Dental hygiene adequate. Normal buccal mucosa. Normal pharynx.  Neck Exam:  Supple, no masses or nodes. No bruits  Thyroid Exam: No nodules or enlargement.  Chest/Respiratory Exam: Normal chest wall and respirations. Clear to auscultation.  Cardiovascular Exam: Regular rate and rhythm. S1, S2, no murmur, click, gallop, or rubs.  Gastrointestinal Exam: Soft, non-tender, no masses or organomegaly..  Lymphatic Exam: Non-palpable nodes in neck,clavicular, axillary, or inguinal regions.  Musculoskeletal Exam: Back is straight and non-tender, full ROM of upper and lower extremities.  Foot Exam: Left and right foot: good pedal pulses  Skin: no rash or abnormalities  Neurologic Exam:  normal gross motor, tone coordination and no tremor.  Psychiatric Exam: Alert and oriented - appropriate affect.     Recent Labs   Lab Test 05/08/19  1054   HGB 13.8         POTASSIUM 3.7   CR 0.84        Diagnostics:  Recent Results (from the past 24 hour(s))   Comprehensive metabolic panel    Collection Time: 11/20/20 10:57 AM   Result Value Ref Range    Sodium 138 134 - 144 mmol/L    Potassium 3.9 3.5 - 5.1 mmol/L    Chloride 106 98 - 107 mmol/L    Carbon Dioxide 25 21 - 31 mmol/L    Anion Gap 7 3 - 14 mmol/L    Glucose 97 70 - 105 mg/dL    Urea Nitrogen 10 7 - 25 mg/dL    Creatinine 0.96 0.60 - 1.20 mg/dL    GFR Estimate 69 >60 mL/min/[1.73_m2]    GFR Estimate If Black 83 >60 mL/min/[1.73_m2]    Calcium 9.6 8.6 - 10.3 mg/dL    Bilirubin Total 0.3 0.3 - 1.0 mg/dL    Albumin 4.4 3.5 - 5.7 g/dL    Protein Total 7.9 6.4 - 8.9 g/dL    Alkaline Phosphatase 56  34 - 104 U/L    ALT 16 7 - 52 U/L    AST 15 13 - 39 U/L   CBC with platelets differential    Collection Time: 11/20/20 10:57 AM   Result Value Ref Range    WBC 7.8 4.0 - 11.0 10e9/L    RBC Count 4.90 3.8 - 5.2 10e12/L    Hemoglobin 14.5 11.7 - 15.7 g/dL    Hematocrit 42.8 35.0 - 47.0 %    MCV 87 78 - 100 fl    MCH 29.6 26.5 - 33.0 pg    MCHC 33.9 31.5 - 36.5 g/dL    RDW 11.9 10.0 - 15.0 %    Platelet Count 247 150 - 450 10e9/L    Diff Method Automated Method     % Neutrophils 61.5 %    % Lymphocytes 27.1 %    % Monocytes 8.9 %    % Eosinophils 1.1 %    % Basophils 0.8 %    % Immature Granulocytes 0.6 %    Absolute Neutrophil 4.8 1.6 - 8.3 10e9/L    Absolute Lymphocytes 2.1 0.8 - 5.3 10e9/L    Absolute Monocytes 0.7 0.0 - 1.3 10e9/L    Absolute Eosinophils 0.1 0.0 - 0.7 10e9/L    Absolute Basophils 0.1 0.0 - 0.2 10e9/L    Abs Immature Granulocytes 0.1 0 - 0.4 10e9/L   Pregnancy, Urine (HCG)    Collection Time: 11/20/20 11:03 AM   Result Value Ref Range    HCG Qual Urine Negative NEG^Negative   UA reflex to Microscopic    Collection Time: 11/20/20 11:03 AM   Result Value Ref Range    Color Urine Yellow     Appearance Urine Clear     Glucose Urine Negative NEG^Negative mg/dL    Bilirubin Urine Negative NEG^Negative    Ketones Urine Negative NEG^Negative mg/dL    Specific Gravity Urine 1.006 1.003 - 1.035    Blood Urine Large (A) NEG^Negative    pH Urine 5.0 5.0 - 7.0 pH    Protein Albumin Urine Negative NEG^Negative mg/dL    Urobilinogen mg/dL Normal 0.0 - 2.0 mg/dL    Nitrite Urine Negative NEG^Negative    Leukocyte Esterase Urine Negative NEG^Negative    Source Midstream Urine     RBC Urine 49 (H) 0 - 2 /HPF    WBC Urine 2 0 - 5 /HPF    Bacteria Urine Few (A) NEG^Negative /HPF    Mucous Urine Present (A) NEG^Negative /LPF      No EKG required, no history of coronary heart disease, significant arrhythmia, peripheral arterial disease or other structural heart disease.    Revised Cardiac Risk Index (RCRI):  The patient  has the following serious cardiovascular risks for perioperative complications:   - No serious cardiac risks = 0 points     RCRI Interpretation: 0 points: Class I (very low risk - 0.4% complication rate)           Assessment & Plan   The proposed surgical procedure is considered LOW risk.    Ita was seen today for pre-op exam.    Diagnoses and all orders for this visit:    Preop general physical exam  -     CBC with platelets differential; Future  -     Comprehensive metabolic panel; Future  -     Pregnancy, Urine (HCG)  -     UA reflex to Microscopic; Future  -     Comprehensive metabolic panel  -     CBC with platelets differential  -     UA reflex to Microscopic    Large breasts    Major depressive disorder, recurrent episode, moderate (H)  -     CBC with platelets differential; Future  -     Comprehensive metabolic panel; Future  -     Comprehensive metabolic panel  -     CBC with platelets differential    Moderate persistent asthma without complication    Celiac disease    Vitamin D deficiency  -     Vitamin D Total GH; Future  -     Vitamin D Total [facility]    Cold thyroid nodule, negative work up.   -     Thyrotropin GH; Future  -     T4 free; Future  -     T4 free  -     Thyrotropin [facility]    Needs flu shot  -     GH-IMM- FLU VAC PRESRV FREE QUAD SPLIT VIR > 6 MONTHS IM    RAISA (obstructive sleep apnea)    Class 3 severe obesity due to excess calories without serious comorbidity with body mass index (BMI) of 40.0 to 44.9 in adult (H)       Has mild sleep apnea but does not use CPAP.       Risks and Recommendations:  The patient has the following additional risks and recommendations for perioperative complications:   - Morbid obesity (BMI >40)    Medication Instructions:  Patient is to take all scheduled medications on the day of surgery EXCEPT for modifications listed below:   NSAIDs.     RECOMMENDATION:  APPROVAL GIVEN to proceed with proposed procedure, without further diagnostic evaluation.    Signed  Electronically by: Will Reynoso MD    Copy of this evaluation report is provided to requesting physician.    Preop Count includes the [facility] Preop Guidelines    Revised Cardiac Risk Index

## 2020-12-02 ENCOUNTER — ALLIED HEALTH/NURSE VISIT (OUTPATIENT)
Dept: FAMILY MEDICINE | Facility: OTHER | Age: 30
End: 2020-12-02
Payer: COMMERCIAL

## 2020-12-02 DIAGNOSIS — Z20.822 COVID-19 RULED OUT: Primary | ICD-10-CM

## 2020-12-02 LAB
SARS-COV-2 RNA SPEC QL NAA+PROBE: NORMAL
SPECIMEN SOURCE: NORMAL

## 2020-12-02 PROCEDURE — 99207 PR NO CHARGE NURSE ONLY: CPT

## 2020-12-02 PROCEDURE — C9803 HOPD COVID-19 SPEC COLLECT: HCPCS

## 2020-12-02 PROCEDURE — U0003 INFECTIOUS AGENT DETECTION BY NUCLEIC ACID (DNA OR RNA); SEVERE ACUTE RESPIRATORY SYNDROME CORONAVIRUS 2 (SARS-COV-2) (CORONAVIRUS DISEASE [COVID-19]), AMPLIFIED PROBE TECHNIQUE, MAKING USE OF HIGH THROUGHPUT TECHNOLOGIES AS DESCRIBED BY CMS-2020-01-R: HCPCS | Mod: ZL

## 2020-12-02 NOTE — PROGRESS NOTES
Patient swabbed for COVID-19 testing.  Mary Mercado LPN on 12/2/2020 at 10:09 AM    Surgery at [facility] on 12-07-20.

## 2020-12-03 LAB
LABORATORY COMMENT REPORT: NORMAL
SARS-COV-2 RNA SPEC QL NAA+PROBE: NEGATIVE
SPECIMEN SOURCE: NORMAL

## 2020-12-30 ENCOUNTER — ALLIED HEALTH/NURSE VISIT (OUTPATIENT)
Dept: FAMILY MEDICINE | Facility: OTHER | Age: 30
End: 2020-12-30
Attending: FAMILY MEDICINE
Payer: COMMERCIAL

## 2020-12-30 DIAGNOSIS — J02.9 SORE THROAT: Primary | ICD-10-CM

## 2020-12-30 LAB
SARS-COV-2 RNA SPEC QL NAA+PROBE: NORMAL
SPECIMEN SOURCE: NORMAL

## 2020-12-30 PROCEDURE — C9803 HOPD COVID-19 SPEC COLLECT: HCPCS

## 2020-12-30 PROCEDURE — U0003 INFECTIOUS AGENT DETECTION BY NUCLEIC ACID (DNA OR RNA); SEVERE ACUTE RESPIRATORY SYNDROME CORONAVIRUS 2 (SARS-COV-2) (CORONAVIRUS DISEASE [COVID-19]), AMPLIFIED PROBE TECHNIQUE, MAKING USE OF HIGH THROUGHPUT TECHNOLOGIES AS DESCRIBED BY CMS-2020-01-R: HCPCS | Mod: ZL | Performed by: FAMILY MEDICINE

## 2021-01-09 ENCOUNTER — HEALTH MAINTENANCE LETTER (OUTPATIENT)
Age: 31
End: 2021-01-09

## 2021-01-17 DIAGNOSIS — M62.838 MUSCLE SPASM: ICD-10-CM

## 2021-01-18 RX ORDER — CYCLOBENZAPRINE HCL 10 MG
TABLET ORAL
Qty: 30 TABLET | Refills: 3 | Status: SHIPPED | OUTPATIENT
Start: 2021-01-18 | End: 2021-07-06

## 2021-01-18 NOTE — TELEPHONE ENCOUNTER
[id number] ([id number] of [facility] sent Rx request for the following:      Requested Prescriptions   Pending Prescriptions Disp Refills     cyclobenzaprine (FLEXERIL) 10 MG tablet [Pharmacy Med Name: CYCLOBENZAPRINE 10MG TABLET] 30 tablet 5     Sig: TAKE 1 TABLET BY MOUTH 3 TIMES DAILY IF NEEDED FOR MUSCLE SPASM.       There is no refill protocol information for this order          Last Prescription Date:   1/10/2020  Last Fill Qty/Refills:         30, R-5    Last Office Visit:              11/20/2020   Future Office visit:           None noted     Routing refill request to provider for review/approval because:  Drug not on the [facility], [facility] or [facility] refill protocol or controlled substance.    Unable to complete prescription refill per RN Medication Refill Policy.................... Gabriela Alexander RN ....................  1/18/2021   1:08 PM

## 2021-03-14 DIAGNOSIS — K21.9 GASTROESOPHAGEAL REFLUX DISEASE WITHOUT ESOPHAGITIS: Primary | ICD-10-CM

## 2021-03-15 NOTE — TELEPHONE ENCOUNTER
omeprazole (PRILOSEC) 20 MG DR capsule     Sig: TAKE 1 CAPSULE (20 MG) BY MOUTH DAILY           Last filled Date:  10/28/20  Last Fill Quantity: 38,    Last Office Visit: 11/20/2020  Future Office visit:       Routing refill request to provider for review/approval because:  Medication is reported/historical, unable to refill per protocol. Peggy Valencia RN on 3/15/2021 at 11:01 AM

## 2021-03-17 ENCOUNTER — OFFICE VISIT (OUTPATIENT)
Dept: FAMILY MEDICINE | Facility: OTHER | Age: 31
End: 2021-03-17
Attending: PHYSICIAN ASSISTANT
Payer: COMMERCIAL

## 2021-03-17 VITALS
DIASTOLIC BLOOD PRESSURE: 72 MMHG | OXYGEN SATURATION: 98 % | TEMPERATURE: 97.2 F | HEIGHT: 64 IN | SYSTOLIC BLOOD PRESSURE: 130 MMHG | BODY MASS INDEX: 42.58 KG/M2 | WEIGHT: 249.4 LBS | HEART RATE: 87 BPM | RESPIRATION RATE: 18 BRPM

## 2021-03-17 DIAGNOSIS — R11.2 NAUSEA AND VOMITING, INTRACTABILITY OF VOMITING NOT SPECIFIED, UNSPECIFIED VOMITING TYPE: Primary | ICD-10-CM

## 2021-03-17 LAB
ALBUMIN SERPL-MCNC: 4.3 G/DL (ref 3.5–5.7)
ALP SERPL-CCNC: 49 U/L (ref 34–104)
ALT SERPL W P-5'-P-CCNC: 18 U/L (ref 7–52)
ANION GAP SERPL CALCULATED.3IONS-SCNC: 7 MMOL/L (ref 3–14)
AST SERPL W P-5'-P-CCNC: 17 U/L (ref 13–39)
BILIRUB SERPL-MCNC: 0.2 MG/DL (ref 0.3–1)
BUN SERPL-MCNC: 14 MG/DL (ref 7–25)
CALCIUM SERPL-MCNC: 9.2 MG/DL (ref 8.6–10.3)
CHLORIDE SERPL-SCNC: 105 MMOL/L (ref 98–107)
CO2 SERPL-SCNC: 26 MMOL/L (ref 21–31)
CREAT SERPL-MCNC: 0.99 MG/DL (ref 0.6–1.2)
GFR SERPL CREATININE-BSD FRML MDRD: 66 ML/MIN/{1.73_M2}
GLUCOSE SERPL-MCNC: 129 MG/DL (ref 70–105)
HCG UR QL: NEGATIVE
POTASSIUM SERPL-SCNC: 3.7 MMOL/L (ref 3.5–5.1)
PROT SERPL-MCNC: 7.4 G/DL (ref 6.4–8.9)
SODIUM SERPL-SCNC: 138 MMOL/L (ref 134–144)

## 2021-03-17 PROCEDURE — 80053 COMPREHEN METABOLIC PANEL: CPT | Mod: ZL | Performed by: NURSE PRACTITIONER

## 2021-03-17 PROCEDURE — 36415 COLL VENOUS BLD VENIPUNCTURE: CPT | Mod: ZL | Performed by: NURSE PRACTITIONER

## 2021-03-17 PROCEDURE — 81025 URINE PREGNANCY TEST: CPT | Mod: ZL | Performed by: NURSE PRACTITIONER

## 2021-03-17 PROCEDURE — 99213 OFFICE O/P EST LOW 20 MIN: CPT | Performed by: NURSE PRACTITIONER

## 2021-03-17 PROCEDURE — G0463 HOSPITAL OUTPT CLINIC VISIT: HCPCS

## 2021-03-17 RX ORDER — ONDANSETRON 4 MG/1
4 TABLET, ORALLY DISINTEGRATING ORAL EVERY 8 HOURS PRN
Qty: 15 TABLET | Refills: 0 | Status: SHIPPED | OUTPATIENT
Start: 2021-03-17 | End: 2023-09-27

## 2021-03-17 ASSESSMENT — PAIN SCALES - GENERAL: PAINLEVEL: SEVERE PAIN (6)

## 2021-03-17 ASSESSMENT — MIFFLIN-ST. JEOR: SCORE: 1828.33

## 2021-03-17 NOTE — PROGRESS NOTES
HPI:    Ita Poole is a 31 year old female who presents to [facility] today for nausea and vomiting that has been persisting for the last month. Denies fever or chills. The patient states that she only vomits in the morning and states that the emesis is bile in appearance. Denies any abdominal pain. Denies any blood in her stool or emesis. She states that her gallbladder has been removed. The patient reports intermittent diarrhea. LMP was  lasted 3 days, but was more spotty than usual. She continues to take her omprazole every day and states that this has been providing relief. The patient denies dysuria, frequency or urgency. The patient reports she started having a headache intermittently yesterday. States that it is a stabbing headache to the right back of her head. Patient has a history of migraines. Reports taking ibuprofen for her headache, which has helped to relieve the pain. Patient denies having any changes in vision or blurry vision during today's visit.       Past Medical History:   Diagnosis Date     Allergic rhinitis     No Comments Provided     Celiac disease     No Comments Provided     Mass of right index finger at the PIP joint 2018     S/P excision of mass right index finger 2018     Uncomplicated asthma     No Comments Provided     Past Surgical History:   Procedure Laterality Date      SECTION      ,4/10/14, Section     COLONOSCOPY      age 10     ESOPHAGOSCOPY, GASTROSCOPY, DUODENOSCOPY (EGD), COMBINED      [id number],ESOPHAGOGASTRODUODENOSCOPY,age 10, small bowel bx: celiac disease     EXCISE MASS UPPER EXTREMITY Right 2018    Procedure: EXCISE MASS UPPER EXTREMITY;  Right Index Finger Mass Excision;  Surgeon: Nate Allen DO;  Location: [facility] OR     LAPAROSCOPIC CHOLECYSTECTOMY      9/22/15,Cholecystectomy,Laparoscopic     S/P EXCISION OF MASS RIGHT INDEX FINGER Right 2018     Social History     Tobacco Use     Smoking status: Never  Smoker     Smokeless tobacco: Never Used     Tobacco comment: Quit smoking: passive exposure   Substance Use Topics     Alcohol use: Yes     Alcohol/week: 0.0 standard drinks     Comment: social      Current Outpatient Medications   Medication Sig Dispense Refill     buPROPion (WELLBUTRIN SR) 100 MG 12 hr tablet Take 100 mg by mouth every morning       fluticasone (FLONASE) 50 MCG/ACT nasal spray Spray 2 sprays into both nostrils daily 18 mL 11     ibuprofen (ADVIL/MOTRIN) 800 MG tablet TAKE 1 TABLET BY MOUTH 3 TIMES DAILY IF NEEDED FOR PAIN. 90 tablet 11     medical cannabis (Patient's own supply) See Admin Instructions (The purpose of this order is to document that the patient reports taking medical cannabis.  This is not a prescription, and is not used to certify that the patient has a qualifying medical condition.)       omeprazole (PRILOSEC) 20 MG DR capsule TAKE 1 CAPSULE (20 MG) BY MOUTH DAILY 90 capsule 3     risperiDONE (RISPERDAL) 0.5 MG tablet TAKE 1 TABLET (0.5 MG) BY MOUTH TWICE A DAY AT BEDTIME AND AS NEEDED 180 tablet 3     zolpidem (AMBIEN) 5 MG tablet Take 5 mg by mouth nightly as needed for sleep       albuterol (PROAIR HFA/PROVENTIL HFA/VENTOLIN HFA) 108 (90 BASE) MCG/ACT Inhaler Inhale 2 puffs into the lungs every 4 hours as needed for wheezing       cyclobenzaprine (FLEXERIL) 10 MG tablet TAKE 1 TABLET BY MOUTH 3 TIMES DAILY IF NEEDED FOR MUSCLE SPASM. (Patient not taking: Reported on 3/17/2021) 30 tablet 3     EPI E-Z PEN LYNETTE 1:1000  IJ 1 TIME ONLY (Patient not taking: No sig reported) 1 0     ketoconazole (NIZORAL) 2 % external cream Apply topically daily (Patient not taking: Reported on 3/17/2021) 30 g 3     Allergies   Allergen Reactions     Amoxicillin-Pot Clavulanate Hives     Augmentin Diarrhea     Yeast Infection in colon     Azithromycin Nausea     Azithromycin      Can't take because it contains wheat     Codeine      constipation     Gluten      Gluten Meal      Other reaction(s):  "Constipation  Celiac disease, abdominal pain     Food Diarrhea     Other reaction(s): Constipation  Wheat, rye, barley         Past medical history, past surgical history, current medications and allergies reviewed and accurate to the best of my knowledge.        ROS:  Refer to HPI    /72   Pulse 87   Temp 97.2  F (36.2  C) (Tympanic)   Resp 18   Ht 1.613 m (5' 3.5\")   Wt 113.1 kg (249 lb 6.4 oz)   LMP 02/17/2021 (Within Days)   SpO2 98%   BMI 43.49 kg/m      EXAM:  General Appearance: Well appearing female, appropriate appearance for age. No acute distress  Respiratory: normal chest wall and respirations.  Normal effort.  Clear to auscultation bilaterally, no wheezing, crackles or rhonchi.  No increased work of breathing.  No cough appreciated.  Neurological exam: CN II-XII intact.   Cardiac: RRR with no murmurs  Abdomen: soft, nontender, no rigidity, no rebound tenderness or guarding, normal bowel sounds present      Dermatological: no rashes noted of exposed skin  Psychological: normal affect, alert, oriented, and pleasant.       Labs:  Results for orders placed or performed in visit on 03/17/21   Pregnancy, Urine (HCG)     Status: None   Result Value Ref Range    HCG Qual Urine Negative NEG^Negative   Comprehensive Metabolic Panel     Status: Abnormal   Result Value Ref Range    Sodium 138 134 - 144 mmol/L    Potassium 3.7 3.5 - 5.1 mmol/L    Chloride 105 98 - 107 mmol/L    Carbon Dioxide 26 21 - 31 mmol/L    Anion Gap 7 3 - 14 mmol/L    Glucose 129 (H) 70 - 105 mg/dL    Urea Nitrogen 14 7 - 25 mg/dL    Creatinine 0.99 0.60 - 1.20 mg/dL    GFR Estimate 66 >60 mL/min/[1.73_m2]    GFR Estimate If Black 80 >60 mL/min/[1.73_m2]    Calcium 9.2 8.6 - 10.3 mg/dL    Bilirubin Total 0.2 (L) 0.3 - 1.0 mg/dL    Albumin 4.3 3.5 - 5.7 g/dL    Protein Total 7.4 6.4 - 8.9 g/dL    Alkaline Phosphatase 49 34 - 104 U/L    ALT 18 7 - 52 U/L    AST 17 13 - 39 U/L             ASSESSMENT/PLAN:  1. Nausea and vomiting, " intractability of vomiting not specified, unspecified vomiting type    - Pregnancy, Urine (HCG)  - Comprehensive Metabolic Panel; Future  - Comprehensive Metabolic Panel  - ondansetron (ZOFRAN-ODT) 4 MG ODT tab; Take 1 tablet (4 mg) by mouth every 8 hours as needed for nausea  Dispense: 15 tablet; Refill: 0    Urine Hcg was negative    CMP results showed a blood glucose of 129, the patient was not fasting, and a bilirubin total just below normal limits.     Discussed with the patient that based on her symptoms and physical exam findings it is unclear of exactly what is causing her nausea and vomiting that only occurs in the mornings.     The patient was prescribed zofran every 8 hours PRN for her nausea.      The patient has a follow up appointment scheduled with her PCP next week. Instructed the patient to follow up sooner if her symptoms worsen.     Instructed the patient to try avoiding eating 1-2 hours before going to bed and try propping herself up with pillows at night.     May use over-the-counter Tylenol or ibuprofen PRN    Discussed warning signs/symptoms indicative of need to f/u    Follow up if symptoms persist or worsen or concerns      I explained my diagnostic considerations and recommendations to the patient, who voiced understanding and agreement with the treatment plan. All questions were answered. We discussed potential side effects of any prescribed or recommended therapies, as well as expectations for response to treatments.    Disclaimer:  This note consists of words and symbols derived from keyboarding, dictation, or using voice recognition software. As a result, there may be errors in the script that have gone undetected. Please consider this when interpreting information found in this note.

## 2021-03-17 NOTE — NURSING NOTE
"Chief Complaint   Patient presents with     Vomiting     Headache     Patient is here for nausea and vomiting that started a month ago. Patient states she only vomits in the morning. Patient developed a headache yesterday. Patient states she took 800mg ibuprofen this morning which did help with the headache. Patients LMP 2/17/21.    Initial /72   Pulse 87   Temp 97.2  F (36.2  C) (Tympanic)   Resp 18   Ht 1.613 m (5' 3.5\")   Wt 113.1 kg (249 lb 6.4 oz)   LMP 02/17/2021 (Within Days)   SpO2 98%   BMI 43.49 kg/m   Estimated body mass index is 43.49 kg/m  as calculated from the following:    Height as of this encounter: 1.613 m (5' 3.5\").    Weight as of this encounter: 113.1 kg (249 lb 6.4 oz).  Medication Reconciliation: complete    Rama Muñoz LPN  "

## 2021-03-21 DIAGNOSIS — M79.18 RIGHT BUTTOCK PAIN: ICD-10-CM

## 2021-03-21 DIAGNOSIS — S30.0XXA CONTUSION, BUTTOCK, INITIAL ENCOUNTER: ICD-10-CM

## 2021-03-22 RX ORDER — IBUPROFEN 800 MG/1
TABLET, FILM COATED ORAL
Qty: 90 TABLET | Refills: 7 | Status: SHIPPED | OUTPATIENT
Start: 2021-03-22 | End: 2022-07-11

## 2021-03-22 NOTE — TELEPHONE ENCOUNTER
"Requested Prescriptions   Pending Prescriptions Disp Refills     ibuprofen (ADVIL/MOTRIN) 800 MG tablet [Pharmacy Med Name: IBUPROFEN 800MG TABLET] 90 tablet 11     Sig: TAKE 1 TABLET BY MOUTH 3 TIMES DAILY IF NEEDED FOR PAIN.       NSAID Medications Passed - 3/21/2021 10:56 PM        Passed - Blood pressure under 140/90 in past 12 months     BP Readings from Last 3 Encounters:   03/17/21 130/72   11/20/20 138/68   08/18/20 134/88                 Passed - Normal ALT on file in past 12 months     Recent Labs   Lab Test 03/17/21  1344 10/18/16  1832 10/18/16  1832   ALT 18   < >  --    GICHALT  --   --  12    < > = values in this interval not displayed.             Passed - Normal AST on file in past 12 months     Recent Labs   Lab Test 03/17/21  1344 10/18/16  1832 10/18/16  1832   AST 17   < >  --    GICHAST  --   --  14    < > = values in this interval not displayed.             Passed - Recent (12 mo) or future (30 days) visit within the authorizing provider's specialty     Patient has had an office visit with the authorizing provider or a provider within the authorizing providers department within the previous 12 mos or has a future within next 30 days. See \"Patient Info\" tab in inbasket, or \"Choose Columns\" in Meds & Orders section of the refill encounter.              Passed - Patient is age 6-64 years        Passed - Normal CBC on file in past 12 months     Recent Labs   Lab Test 11/20/20  1057 10/18/16  1815 10/18/16  1815   WBC 7.8   < >  --    GICHWBC  --   --  10.0   RBC 4.90   < >  --    GICHRBC  --   --  4.37   HGB 14.5   < > 13.3   HCT 42.8   < > 38.6      < > 305    < > = values in this interval not displayed.                 Passed - Medication is active on med list        Passed - No active pregnancy on record        Passed - Normal serum creatinine on file in past 12 months     Recent Labs   Lab Test 03/17/21  1344   CR 0.99       Ok to refill medication if creatinine is low          Passed - " No positive pregnancy test in past 12 months           LOV 11/20/20 [place]  Prescription approved per [facility] Refill Protocol.  Brenda J. Goodell, RN on 3/22/2021 at 12:45 PM

## 2021-03-24 ENCOUNTER — OFFICE VISIT (OUTPATIENT)
Dept: FAMILY MEDICINE | Facility: OTHER | Age: 31
End: 2021-03-24
Attending: FAMILY MEDICINE
Payer: COMMERCIAL

## 2021-03-24 VITALS
WEIGHT: 247 LBS | SYSTOLIC BLOOD PRESSURE: 136 MMHG | BODY MASS INDEX: 43.07 KG/M2 | OXYGEN SATURATION: 98 % | DIASTOLIC BLOOD PRESSURE: 84 MMHG | HEART RATE: 128 BPM | RESPIRATION RATE: 18 BRPM | TEMPERATURE: 98.8 F

## 2021-03-24 DIAGNOSIS — M25.561 MECHANICAL KNEE PAIN, RIGHT: ICD-10-CM

## 2021-03-24 DIAGNOSIS — F41.9 ANXIETY: ICD-10-CM

## 2021-03-24 DIAGNOSIS — R11.2 NAUSEA AND VOMITING, INTRACTABILITY OF VOMITING NOT SPECIFIED, UNSPECIFIED VOMITING TYPE: Primary | ICD-10-CM

## 2021-03-24 DIAGNOSIS — F33.1 MAJOR DEPRESSIVE DISORDER, RECURRENT EPISODE, MODERATE (H): ICD-10-CM

## 2021-03-24 DIAGNOSIS — K90.0 CELIAC DISEASE: ICD-10-CM

## 2021-03-24 PROCEDURE — G0463 HOSPITAL OUTPT CLINIC VISIT: HCPCS

## 2021-03-24 PROCEDURE — 99213 OFFICE O/P EST LOW 20 MIN: CPT | Performed by: FAMILY MEDICINE

## 2021-03-24 ASSESSMENT — PAIN SCALES - GENERAL: PAINLEVEL: NO PAIN (0)

## 2021-03-24 NOTE — NURSING NOTE
"Chief Complaint   Patient presents with     Headache     Nausea     Pt presents with c/o ongoing headaches/nausea/vomiting. Thinks may be from stress.    Initial /84 (BP Location: Right arm, Patient Position: Sitting, Cuff Size: Adult Large)   Pulse 128   Temp 98.8  F (37.1  C) (Tympanic)   Resp 18   Wt 112 kg (247 lb)   SpO2 98%   BMI 43.07 kg/m   Estimated body mass index is 43.07 kg/m  as calculated from the following:    Height as of 3/17/21: 1.613 m (5' 3.5\").    Weight as of this encounter: 112 kg (247 lb).  Medication Reconciliation: complete    DENA JC, FABIÁNN  "

## 2021-03-24 NOTE — PROGRESS NOTES
"Nursing Notes:   Phyllis Grey LPN  3/24/2021  8:05 AM  Signed  Chief Complaint   Patient presents with     Headache     Nausea     Pt presents with c/o ongoing headaches/nausea/vomiting. Thinks may be from stress.    Initial /84 (BP Location: Right arm, Patient Position: Sitting, Cuff Size: Adult Large)   Pulse 128   Temp 98.8  F (37.1  C) (Tympanic)   Resp 18   Wt 112 kg (247 lb)   SpO2 98%   BMI 43.07 kg/m   Estimated body mass index is 43.07 kg/m  as calculated from the following:    Height as of 3/17/21: 1.613 m (5' 3.5\").    Weight as of this encounter: 112 kg (247 lb).  Medication Reconciliation: complete    DENA JC LPN      SUBJECTIVE:  Ita Poole  is a 31 year old female who comes in today because of ongoing nausea vomiting and headache.  She thinks it may be stress related.  She does have a history of celiac disease. She follows with psychiatry.  She does use medical cannabis.  Nothing is changed with her use and she has had no issues in the past with cyclic vomiting.  She is on Wellbutrin and Risperdal.  She takes Prilosec.  Her weight has been stable over the course of the last year.  She also has a history of migraine.     She was seen in rapid clinic a week ago.  That note is reviewed.  She had negative pregnancy test and metabolic panel.  She was given some Zofran and told to follow-up with me in a week.    PHQ 2/5/2020 8/18/2020 11/20/2020   PHQ-9 Total Score 4 4 3   Q9: Thoughts of better off dead/self-harm past 2 weeks Not at all Not at all Not at all     TIM-7 SCORE 8/13/2019   Total Score 3       She had the issues before leaving for [place]. She was fine on the trip. She is taking 2 classes and working full time.  She had recurrent symptoms. She works at [employer] and likes her job.  Her classes end in May. She is taking chemistry and computer and web design. She is writing a lot of papers. She is helping with [employer] too.  Since she has an insight to her stress, her symptoms " have abated significantly.    She had an MCL injury of her right knee in high school. Her right knee will catch at times.  Her knee doesn't swell.  It will often be fine for a while and then act up for a period of time.  It sounds as if she has mechanical issue with possibly an osteochondral loose body.    Past Medical, Family, and Social History reviewed and updated as noted below.   ROS is negative except as noted above       Allergies   Allergen Reactions     Amoxicillin-Pot Clavulanate Hives     Augmentin Diarrhea     Yeast Infection in colon     Azithromycin Nausea     Azithromycin      Can't take because it contains wheat     Codeine      constipation     Gluten      Gluten Meal      Other reaction(s): Constipation  Celiac disease, abdominal pain     Food Diarrhea     Other reaction(s): Constipation  Wheat, rye, barley   ,   Family History   Problem Relation Age of Onset     Asthma Mother         Asthma     Allergy (Severe) Mother         Allergies     Asthma Father         Asthma     Heart Disease Father 43        Heart Disease, of MI     Diabetes Father         Diabetes     Asthma Maternal Grandmother         Asthma     Diabetes Paternal Grandfather         Diabetes   ,   Current Outpatient Medications   Medication     albuterol (PROAIR HFA/PROVENTIL HFA/VENTOLIN HFA) 108 (90 BASE) MCG/ACT Inhaler     buPROPion (WELLBUTRIN SR) 100 MG 12 hr tablet     cyclobenzaprine (FLEXERIL) 10 MG tablet     EPI E-Z PEN LYNETTE 1:1000  IJ     fluticasone (FLONASE) 50 MCG/ACT nasal spray     ibuprofen (ADVIL/MOTRIN) 800 MG tablet     medical cannabis (Patient's own supply)     omeprazole (PRILOSEC) 20 MG DR capsule     ondansetron (ZOFRAN-ODT) 4 MG ODT tab     risperiDONE (RISPERDAL) 0.5 MG tablet     zolpidem (AMBIEN) 5 MG tablet     ketoconazole (NIZORAL) 2 % external cream     No current facility-administered medications for this visit.    ,   Past Medical History:   Diagnosis Date     Allergic rhinitis     No Comments  Provided     Celiac disease     No Comments Provided     Mass of right index finger at the PIP joint 2018     S/P excision of mass right index finger 2018     Uncomplicated asthma     No Comments Provided   ,   Patient Active Problem List    Diagnosis Date Noted     S/P excision of mass right index finger 2018     Priority: Medium     Cold thyroid nodule 2017     Priority: Medium     Obesity 2017     Priority: Medium     Irregular menses 2016     Priority: Medium     H/O  section 2014     Priority: Medium     Major depressive disorder, recurrent episode, moderate (H) 2013     Priority: Medium     Overview:   Dx added per office visit on 12 with Dr. Burch.  Fátima Baltazar RN, Clinical , [facility], 2013 5:02 PM       Vitamin D deficiency 2007     Priority: Medium     Problem list name updated by automated process. Provider to review       Angioedema 2006     Priority: Medium     Problem list name updated by automated process. Provider to review       Celiac disease 2006     Priority: Medium     Abdominal pain, generalized 2006     Priority: Medium     C.diff, admitted for obs. X 24 hrs. Flagyl started as outpt. Laura Moffett MD         Depressive disorder, not elsewhere classified 10/28/2005     Priority: Medium     Followed at [facility], meds by Marnie Marsh RN       Moderate persistent asthma 2005     Priority: Medium     aap completed         Allergic rhinitis 2005     Priority: Medium     Problem list name updated by automated process. Provider to review       Migraine 2005     Priority: Medium     Problem list name updated by automated process. Provider to review     ,   Past Surgical History:   Procedure Laterality Date      SECTION      ,4/10/14, Section     COLONOSCOPY      age 10     ESOPHAGOSCOPY, GASTROSCOPY, DUODENOSCOPY  (EGD), COMBINED      [id number],ESOPHAGOGASTRODUODENOSCOPY,age 10, small bowel bx: celiac disease     EXCISE MASS UPPER EXTREMITY Right 2/28/2018    Procedure: EXCISE MASS UPPER EXTREMITY;  Right Index Finger Mass Excision;  Surgeon: Nate Allen DO;  Location: [facility] OR     LAPAROSCOPIC CHOLECYSTECTOMY      9/22/15,Cholecystectomy,Laparoscopic     S/P EXCISION OF MASS RIGHT INDEX FINGER Right 02/28/2018    and   Social History     Tobacco Use     Smoking status: Never Smoker     Smokeless tobacco: Never Used     Tobacco comment: Quit smoking: passive exposure   Substance Use Topics     Alcohol use: Yes     Alcohol/week: 0.0 standard drinks     Comment: social      OBJECTIVE:  /84 (BP Location: Right arm, Patient Position: Sitting, Cuff Size: Adult Large)   Pulse 128   Temp 98.8  F (37.1  C) (Tympanic)   Resp 18   Wt 112 kg (247 lb)   SpO2 98%   BMI 43.07 kg/m     EXAM:  Alert cooperative, no distress.  Affect is broad range and appropriate.  Abdomen is soft nontender.  Knee is not swollen.  ASSESSMENT/Plan :    Ita was seen today for headache and nausea.    Diagnoses and all orders for this visit:    Nausea and vomiting, intractability of vomiting not specified, unspecified vomiting type    Celiac disease    Major depressive disorder, recurrent episode, moderate (H)    Mechanical knee pain, right    Anxiety      Support encouragement offered and lengthy discussion with regard to the insight to her stressors.  We will try and mitigate that and take some time for self-care.  She will continue to see her mental health provider for consideration of medication adjustment.    If her knee becomes an ongoing issue, would then consider orthopedic referral as she may have an osteochondral loose body.    A total of 25 minutes was spent with the patient, reviewing records, tests, ordering medications, tests or procedures and documenting clinical information in the EHR.    Will Reynoso MD

## 2021-04-23 ENCOUNTER — MYC MEDICAL ADVICE (OUTPATIENT)
Dept: FAMILY MEDICINE | Facility: OTHER | Age: 31
End: 2021-04-23

## 2021-04-23 DIAGNOSIS — M23.90 KNEE LOCKING, UNSPECIFIED LATERALITY: Primary | ICD-10-CM

## 2021-05-05 ENCOUNTER — OFFICE VISIT (OUTPATIENT)
Dept: FAMILY MEDICINE | Facility: OTHER | Age: 31
End: 2021-05-05
Attending: FAMILY MEDICINE
Payer: COMMERCIAL

## 2021-05-05 ENCOUNTER — HOSPITAL ENCOUNTER (OUTPATIENT)
Dept: GENERAL RADIOLOGY | Facility: OTHER | Age: 31
End: 2021-05-05
Attending: FAMILY MEDICINE
Payer: COMMERCIAL

## 2021-05-05 VITALS
HEIGHT: 64 IN | WEIGHT: 244.4 LBS | RESPIRATION RATE: 16 BRPM | BODY MASS INDEX: 41.73 KG/M2 | DIASTOLIC BLOOD PRESSURE: 86 MMHG | HEART RATE: 120 BPM | OXYGEN SATURATION: 98 % | TEMPERATURE: 97.1 F | SYSTOLIC BLOOD PRESSURE: 124 MMHG

## 2021-05-05 DIAGNOSIS — M22.2X1 PATELLOFEMORAL PAIN SYNDROME OF RIGHT KNEE: ICD-10-CM

## 2021-05-05 DIAGNOSIS — M23.91 LOCKING OF RIGHT KNEE: Primary | ICD-10-CM

## 2021-05-05 PROCEDURE — G0463 HOSPITAL OUTPT CLINIC VISIT: HCPCS | Mod: 25

## 2021-05-05 PROCEDURE — 73560 X-RAY EXAM OF KNEE 1 OR 2: CPT | Mod: LT

## 2021-05-05 PROCEDURE — G0463 HOSPITAL OUTPT CLINIC VISIT: HCPCS

## 2021-05-05 PROCEDURE — 99214 OFFICE O/P EST MOD 30 MIN: CPT | Performed by: FAMILY MEDICINE

## 2021-05-05 RX ORDER — LORAZEPAM 1 MG/1
1 TABLET ORAL EVERY 6 HOURS PRN
COMMUNITY

## 2021-05-05 ASSESSMENT — MIFFLIN-ST. JEOR: SCORE: 1805.65

## 2021-05-05 ASSESSMENT — PAIN SCALES - GENERAL: PAINLEVEL: SEVERE PAIN (6)

## 2021-05-05 NOTE — Clinical Note
Lauri Reynoso,  Thank you for sending this patient my way.  I saw her today for her right knee.  I have referred her to PT.  I will see her back in follow-up.  Please let me know if you have questions.  Thanks again.    Eddy

## 2021-05-05 NOTE — NURSING NOTE
"Chief Complaint   Patient presents with     Knee Problem     right knee locking has noticed for 4 months, pain 6/10, torn MCL in 2000 or 2001 without surgery     Patient presents to clinic today for right knee locking that she has noticed for 4 months. She states when her knee does lock that it locks for a couple of minutes then it loosens up. The locking can happen up to 4 times per week. She does mention a torn MCL when she was in 8th grade (2000 or 2001) that was not surgically repaired. Other than that no known knee injuries. Pain 6/10.       Initial /86 (BP Location: Right arm, Patient Position: Sitting, Cuff Size: Adult Large)   Pulse 120   Temp 97.1  F (36.2  C) (Tympanic)   Resp 16   Ht 1.613 m (5' 3.5\")   Wt 110.9 kg (244 lb 6.4 oz)   SpO2 98%   BMI 42.61 kg/m   Estimated body mass index is 42.61 kg/m  as calculated from the following:    Height as of this encounter: 1.613 m (5' 3.5\").    Weight as of this encounter: 110.9 kg (244 lb 6.4 oz).         Medication Reconciliation: Complete      Lucina Fox LPN   "

## 2021-05-05 NOTE — PROGRESS NOTES
"HPI:  31-year-old female coming in for evaluation of right knee locking.  Her symptoms have been going on for the last 4 months.  The symptoms came on without inciting event.  Patient reports that her knee will get locked in the extended position 4-5 times per week.  She has never had this happen before.  She notes associated pain and swelling during these episodes.  It will take 2-3 minutes for the locking to subside and she is able to flex her knee again.  She has a history of a torn MCL in the eighth grade without residual complications.    EXAM:  /86 (BP Location: Right arm, Patient Position: Sitting, Cuff Size: Adult Large)   Pulse 120   Temp 97.1  F (36.2  C) (Tympanic)   Resp 16   Ht 1.613 m (5' 3.5\")   Wt 110.9 kg (244 lb 6.4 oz)   SpO2 98%   BMI 42.61 kg/m    MUSCULOSKELETAL EXAM:  RIGHT KNEE  Inspection:  -No gross deformity  -No bruising or soft tissue swelling  -Scars:  None    Tenderness to palpation of the:  -Quadriceps musculature:  Negative  -Quadriceps tendon:  Negative  -Patella:  Negative  -Medial patellar facet: Positive  -Lateral patellar facet:  Negative  -Inferior pole of the patella:  Negative  -Patellar tendon:  Negative  -Tibial tuberosity:  Negative  -Medial joint line: Mild pain  -Medial collateral ligament: Mild pain  -Medial hamstring tendons:  Negative  -Medial femoral condyle:  Negative  -Medial tibial plateau:  Negative  -Pes anserine bursa:  Negative  -Lateral joint line:  Negative  -Distal IT band:  Negative  -Gerdy's tubercle:  Negative  -Lateral collateral ligament:  Negative  -Lateral hamstring tendons:  Negative  -Lateral femoral condyle:  Negative  -Lateral tibial plateau:  Negative  -Posterior lateral corner:  Negative  -Popliteal fossa:  Negative    Range of Motion:  -Passive flexion:  135  -Passive extension:  0    Strength:  -Extension:  5/5  -Flexion:  5/5    Special Tests:  -Effusion:  Absent  -Medial patellar glide: Positive  -Lateral patellar glide: " Positive  -Patellar apprehension:  Negative  -Medial Glendy's:  Negative  -Lateral Glendy's:  Negative  -Valgus stress: No pain or laxity  -Varus stress:  Negative  -Lachman test:  Negative  -Anterior drawer:  Negative  -Posterior drawer:  Negative    Other:  -Intact sensation to light touch distally.  -No signs of cyanosis. Normal skin temperature of the lower extremity.  -Foot/ankle:  No gross deformity. Full range of motion.  -Left knee:  No gross deformity. No palpable tenderness. Normal strength and ROM.      IMAGIN2021: Four view right knee x-ray  -No fracture, dislocation, or bony lesion.  Lateral view may represent shallow trochlear groove superiorly but the x-ray view is not well lined up for best assessment.    ASSESSMENT/PLAN:  Diagnoses and all orders for this visit:  Locking of right knee  -     XR Knee Standing 2v  Bilateral & 2v Right  -     Orthopedic & Spine  Referral  Patellofemoral pain syndrome of right knee  -     XR Knee Standing 2v  Bilateral & 2v Right    31-year-old female coming in with symptoms of locking of the right knee that seem to be involving the patella.  4 view right knee x-ray from  was personally reviewed in the office with the findings as demonstrated above by my interpretation.  Patient potentially has a shallow trochlear groove but this is hard to assess based on the imaging that was performed.  Given that her knee locks in extension I am concerned that she potentially is subluxing laterally, not allowing her kneecap to engage within the trochlea to allow for flexion.  We discussed treatment options to include physical therapy versus further work-up with advanced imaging.  At this time, with shared decision making, patient will proceed with physical therapy.  -Referral placed to physical therapy  -Handout given on exercises she can begin doing at home  -Demonstrated VMO exercise that the patient should do 1-2 times per day  -Follow-up in the office in  4-6 weeks  -With worsening pain patient can return sooner and we will reevaluate need for MRI as there are other etiologies for mechanical knee symptoms      Eddy Oneil MD  5/5/2021  7:50 AM    Total time spent with this patient was 38 minutes which included chart review, visualization and interpretation of images, time spent with the patient, and documentation.

## 2021-05-06 ASSESSMENT — ASTHMA QUESTIONNAIRES: ACT_TOTALSCORE: 25

## 2021-05-26 ENCOUNTER — HOSPITAL ENCOUNTER (OUTPATIENT)
Dept: PHYSICAL THERAPY | Facility: OTHER | Age: 31
Setting detail: THERAPIES SERIES
End: 2021-05-26
Attending: FAMILY MEDICINE
Payer: COMMERCIAL

## 2021-05-26 DIAGNOSIS — M22.2X1 PATELLOFEMORAL PAIN SYNDROME OF RIGHT KNEE: ICD-10-CM

## 2021-05-26 DIAGNOSIS — M23.91 LOCKING OF RIGHT KNEE: ICD-10-CM

## 2021-05-26 PROCEDURE — 97110 THERAPEUTIC EXERCISES: CPT | Mod: GP

## 2021-05-26 PROCEDURE — 97161 PT EVAL LOW COMPLEX 20 MIN: CPT | Mod: GP

## 2021-05-27 ENCOUNTER — TELEPHONE (OUTPATIENT)
Dept: FAMILY MEDICINE | Facility: OTHER | Age: 31
End: 2021-05-27

## 2021-05-27 NOTE — TELEPHONE ENCOUNTER
Received denial from [facility] for Omeprazole 20MG dr capsules.  I faxed the denial to the refill nurses

## 2021-05-28 ENCOUNTER — HOSPITAL ENCOUNTER (OUTPATIENT)
Dept: PHYSICAL THERAPY | Facility: OTHER | Age: 31
Setting detail: THERAPIES SERIES
End: 2021-05-28
Attending: FAMILY MEDICINE
Payer: COMMERCIAL

## 2021-05-28 PROCEDURE — 97110 THERAPEUTIC EXERCISES: CPT | Mod: GP

## 2021-05-29 NOTE — PROGRESS NOTES
05/26/21 1300   General Information   Type of Visit Initial OP Ortho PT Evaluation   Start of Care Date 05/26/21   Referring Physician Dr. Sanchez   Patient/Family Goals Statement decrease the locking of her knee   Orders Evaluate and Treat   Date of Order 05/05/21   General Information Comments Pt is a 31 year old female presenting with history of R knee issues. She has had some locking of her knee lately that started approximately in February. She had travelled to [place] and she had no instance of locking. She felt it was getting better and then returned to half it do it again. The incidence of locking has decreased from daily to now maybe once or twice a week. The most recent time was yesterday when she was walking down the stairs. It got stuck in flexion but most often gets stuck in extension.    Body Part(s)   Body Part(s) Knee   Presentation and Etiology   Impairments A. Pain;E. Decreased flexibility;M. Locking or catching;D. Decreased ROM   Functional Limitations perform activities of daily living;perform required work activities;perform desired leisure / sports activities   Symptom Location R knee   How/Where did it occur From insidious onset   Pain rating (0-10 point scale) Best (/10);Worst (/10)   Best (/10) 0   Worst (/10) 8   Pain quality A. Sharp;C. Aching   Frequency of pain/symptoms C. With activity   Pain/symptoms are: Worse during the day   Pain/symptoms eased by F. Certain positions;I. OTC medication(s)   Progression of symptoms since onset: Improved   Prior Level of Function   Prior Level of Function-Mobility independent   Prior Level of Function-ADLs independent   Current Level of Function   Patient role/employment history A. Employed   Living environment [place]   Current equipment-Gait/Locomotion None   Current equipment-ADL None   Fall Risk Screen   Fall screen completed by PT   Have you fallen 2 or more times in the past year? No   Have you fallen and had an injury in the past  year? No   Is patient a fall risk? No   Abuse Screen (yes response referral indicated)   Feels Unsafe at Home or Work/School no   Feels Threatened by Someone no   Does Anyone Try to Keep You From Having Contact with Others or Doing Things Outside Your Home? no   Physical Signs of Abuse Present no   Knee Objective Findings   Side (if bilateral, select both right and left) Right   Integumentary  unremarkable   Gait/Locomotion mildly antalgic   Knee/Hip Strength Comments weakness noted at hip and knee extension   Lachmans Test negative   Anterior Drawer Test negative   Posterior Drawer Test negative   Varus Stress Test negative   Valgus Stress Test negative   Glendy's Test clicking but no pain   Knee Special Test Comments Thessaly - clicking but no pain - Pt claims this feels just like what happens before it locks   Palpation medial knee tenderness along MCL and medial tibia   Right Knee Extension AROM -3   Right Knee Flexion AROM 130   Right Gastrocnemius Flexibility limited   Planned Therapy Interventions   Planned Therapy Interventions gait training;manual therapy;joint mobilization;ROM;strengthening;stretching   Planned Modality Interventions   Planned Modality Interventions Ultrasound;Electrical stimulation;Cryotherapy   Clinical Impression   Criteria for Skilled Therapeutic Interventions Met yes, treatment indicated   PT Diagnosis impaired mobility   Influenced by the following impairments weakness, pain   Functional limitations due to impairments limited activity tolerance   Clinical Presentation Stable/Uncomplicated   Clinical Decision Making (Complexity) Low complexity   Therapy Frequency other (see comments)  (1-2x/week)   Predicted Duration of Therapy Intervention (days/wks) 6 weeks   Risk & Benefits of therapy have been explained Yes   Patient, Family & other staff in agreement with plan of care Yes   Clinical Impression Comments Pt presents with functional R LE weakness that is likely contrinbuting to  the locking of her knee. She also does have clicking with meniscal testing, however, she does not have any pain with this. Pt would benefit from skilled therapy for strengthenging in order to improve her activity tolerance   ORTHO GOALS   PT Ortho Eval Goals 1;2;3;4   Ortho Goal 1   Goal Identifier Ambulation   Goal Description Pt will be able to ambulate without gait compensation at self-selected pace for 20 minutes in order to resume prior level of function   Target Date 07/10/21   Ortho Goal 2   Goal Identifier Stairs   Goal Description Pt will be able to reciprocally navigate stairs without loss of balance or pain   Target Date 07/10/21   Ortho Goal 3   Goal Identifier Locking   Goal Description Pt will report no locking of knee for 2 weeks in order to improve her overall levels of function and mobility   Target Date 07/10/21   Ortho Goal 4   Goal Identifier kneeling   Goal Description Pt will be able transition in and out of kneeling position without increasing pain above 3   Target Date 07/10/21   Total Evaluation Time   PT Malvin, Low Complexity Minutes (47514) 20

## 2021-06-09 ENCOUNTER — HOSPITAL ENCOUNTER (OUTPATIENT)
Dept: PHYSICAL THERAPY | Facility: OTHER | Age: 31
Setting detail: THERAPIES SERIES
End: 2021-06-09
Attending: FAMILY MEDICINE
Payer: COMMERCIAL

## 2021-06-09 PROCEDURE — 97110 THERAPEUTIC EXERCISES: CPT | Mod: GP

## 2021-06-11 ENCOUNTER — HOSPITAL ENCOUNTER (OUTPATIENT)
Dept: PHYSICAL THERAPY | Facility: OTHER | Age: 31
Setting detail: THERAPIES SERIES
End: 2021-06-11
Attending: FAMILY MEDICINE
Payer: COMMERCIAL

## 2021-06-11 PROCEDURE — 97110 THERAPEUTIC EXERCISES: CPT | Mod: GP

## 2021-07-02 DIAGNOSIS — M62.838 MUSCLE SPASM: ICD-10-CM

## 2021-07-05 NOTE — TELEPHONE ENCOUNTER
Serena Mahoney  sent Rx request for the following:     Requested Prescriptions   Pending Prescriptions Disp Refills     cyclobenzaprine (FLEXERIL) 10 MG tablet [Pharmacy Med Name: CYCLOBENZAPRINE 10MG TABLET] 30 tablet 3     Sig: TAKE 1 TABLET BY MOUTH 3 TIMES DAILY IF NEEDED FOR MUSCLE SPASM.     Last Prescription Date:   1/18/2021  Last Fill Qty/Refills:         30, R-3    Last Office Visit:              3/24/2021  Future Office visit:           none    Routing refill request to provider for review/approval because:  Drug not on the [facility] or [facility] refill protocol or controlled substance  Drug not on the [facility] refill protocol         There is no refill protocol information for this order        Unable to complete prescription refill per RN Medication Refill Policy.................... Gisella Beth RN ....................  7/5/2021   10:34 AM

## 2021-07-06 RX ORDER — CYCLOBENZAPRINE HCL 10 MG
TABLET ORAL
Qty: 30 TABLET | Refills: 3 | Status: SHIPPED | OUTPATIENT
Start: 2021-07-06 | End: 2022-06-07

## 2021-08-25 ENCOUNTER — TELEPHONE (OUTPATIENT)
Dept: OBGYN | Facility: OTHER | Age: 31
End: 2021-08-25

## 2021-08-25 NOTE — TELEPHONE ENCOUNTER
Pt would like to know if her breast reduction surgery would have anything to do with not having her menstrual cycle for a couple of months.  She states that she is not pregnant as she has taken pregnancy tests.  Please call.    Tate Heard on 8/25/2021 at 8:18 AM

## 2021-08-25 NOTE — TELEPHONE ENCOUNTER
Call received from patient. States she had breast reduction surgery in December 2020 and since has been getting her period every other month, not on birth control, now has not had a period for last 2 months, negative home pregnancy tests. No complaints of pain or discomfort.  Advised patient to be seen to have this further evaluated as she is concerned. Patient voiced understanding and states she will call back and make an appointment. No further questions or concerns at this time.   Mima Townsend RN on 8/25/2021 at 10:39 AM

## 2021-09-20 ENCOUNTER — OFFICE VISIT (OUTPATIENT)
Dept: OBGYN | Facility: OTHER | Age: 31
End: 2021-09-20
Attending: OBSTETRICS & GYNECOLOGY
Payer: COMMERCIAL

## 2021-09-20 VITALS
HEART RATE: 98 BPM | RESPIRATION RATE: 18 BRPM | WEIGHT: 242.4 LBS | SYSTOLIC BLOOD PRESSURE: 124 MMHG | DIASTOLIC BLOOD PRESSURE: 84 MMHG | BODY MASS INDEX: 42.27 KG/M2

## 2021-09-20 DIAGNOSIS — N92.6 IRREGULAR MENSES: Primary | ICD-10-CM

## 2021-09-20 DIAGNOSIS — L02.214 ABSCESS OF GROIN, LEFT: ICD-10-CM

## 2021-09-20 PROBLEM — E66.01 MORBID OBESITY (H): Status: ACTIVE | Noted: 2017-03-03

## 2021-09-20 PROCEDURE — G0463 HOSPITAL OUTPT CLINIC VISIT: HCPCS | Performed by: OBSTETRICS & GYNECOLOGY

## 2021-09-20 PROCEDURE — 99214 OFFICE O/P EST MOD 30 MIN: CPT | Performed by: OBSTETRICS & GYNECOLOGY

## 2021-09-20 RX ORDER — CHOLECALCIFEROL (VITAMIN D3) 50 MCG
1 TABLET ORAL DAILY
COMMUNITY
Start: 2021-08-31 | End: 2023-09-27

## 2021-09-20 RX ORDER — NORGESTIMATE AND ETHINYL ESTRADIOL 0.25-0.035
1 KIT ORAL DAILY
Qty: 84 TABLET | Refills: 3 | Status: SHIPPED | OUTPATIENT
Start: 2021-09-20 | End: 2022-05-09

## 2021-09-20 RX ORDER — LORAZEPAM 0.5 MG/1
TABLET ORAL
COMMUNITY
Start: 2021-09-09 | End: 2023-03-14 | Stop reason: DRUGHIGH

## 2021-09-20 ASSESSMENT — PAIN SCALES - GENERAL: PAINLEVEL: NO PAIN (0)

## 2021-09-20 NOTE — PROGRESS NOTES
CC: irregular menses  HPI:  Ita is a 31 year old female who presents for evaluation of irregular menses. She noted them getting irregular and coming every other month since her breast reduction surgery last December. Her partner had a vasectomy, which they use for birth control.  She did have another cycle last month. She has been in for this every few years, and has responded well to OCP's.    Patient's last menstrual period was 2021.      OB History    Para Term  AB Living   2 2 2 0 0 2   SAB TAB Ectopic Multiple Live Births   0 0 0 0 2      # Outcome Date GA Lbr Ilan/2nd Weight Sex Delivery Anes PTL Lv   2 Term 2014     CS-LTranv   SANTANA   1 Term 2012     CS-LTranv   SANTANA     Past Medical History:   Diagnosis Date     Allergic rhinitis     No Comments Provided     Celiac disease     No Comments Provided     Mass of right index finger at the PIP joint 2018     S/P excision of mass right index finger 2018     Uncomplicated asthma     No Comments Provided     Past Surgical History:   Procedure Laterality Date      SECTION      ,4/10/14, Section     COLONOSCOPY      age 10     ESOPHAGOSCOPY, GASTROSCOPY, DUODENOSCOPY (EGD), COMBINED      [id number],ESOPHAGOGASTRODUODENOSCOPY,age 10, small bowel bx: celiac disease     EXCISE MASS UPPER EXTREMITY Right 2018    Procedure: EXCISE MASS UPPER EXTREMITY;  Right Index Finger Mass Excision;  Surgeon: Nate Allen DO;  Location: [facility] OR     LAPAROSCOPIC CHOLECYSTECTOMY      9/22/15,Cholecystectomy,Laparoscopic     S/P EXCISION OF MASS RIGHT INDEX FINGER Right 2018     Social History     Socioeconomic History     Marital status: Single     Spouse name: Not on file     Number of children: Not on file     Years of education: Not on file     Highest education level: Not on file   Occupational History     Not on file   Tobacco Use     Smoking status: Never Smoker     Smokeless tobacco: Never Used     Tobacco comment:  Quit smoking: passive exposure   Vaping Use     Vaping Use: Never used   Substance and Sexual Activity     Alcohol use: Yes     Alcohol/week: 0.0 standard drinks     Comment: social      Drug use: Yes     Types: Marijuana     Comment: medical      Sexual activity: Yes     Partners: Male     Birth control/protection: None   Other Topics Concern     Parent/sibling w/ CABG, MI or angioplasty before 65F 55M? Not Asked   Social History Narrative     Not on file     Social Determinants of Health     Financial Resource Strain:      Difficulty of Paying Living Expenses:    Food Insecurity:      Worried About Running Out of Food in the Last Year:      Ran Out of Food in the Last Year:    Transportation Needs:      Lack of Transportation (Medical):      Lack of Transportation (Non-Medical):    Physical Activity:      Days of Exercise per Week:      Minutes of Exercise per Session:    Stress:      Feeling of Stress :    Social Connections:      Frequency of Communication with Friends and Family:      Frequency of Social Gatherings with Friends and Family:      Attends Restorationist Services:      Active Member of Clubs or Organizations:      Attends Club or Organization Meetings:      Marital Status:    Intimate Partner Violence:      Fear of Current or Ex-Partner:      Emotionally Abused:      Physically Abused:      Sexually Abused:      Family History   Problem Relation Age of Onset     Asthma Mother         Asthma     Allergy (Severe) Mother         Allergies     Asthma Father         Asthma     Heart Disease Father 43        Heart Disease, of MI     Diabetes Father         Diabetes     Asthma Maternal Grandmother         Asthma     Diabetes Paternal Grandfather         Diabetes       Current Outpatient Medications   Medication     albuterol (PROAIR HFA/PROVENTIL HFA/VENTOLIN HFA) 108 (90 BASE) MCG/ACT Inhaler     buPROPion (WELLBUTRIN SR) 100 MG 12 hr tablet     cyclobenzaprine (FLEXERIL) 10 MG tablet     EPI E-Z PEN LYNETTE  1:1000  IJ     fluticasone (FLONASE) 50 MCG/ACT nasal spray     ibuprofen (ADVIL/MOTRIN) 800 MG tablet     LORazepam (ATIVAN) 0.5 MG tablet     LORazepam (ATIVAN) 1 MG tablet     medical cannabis (Patient's own supply)     norgestimate-ethinyl estradiol (ORTHO-CYCLEN) 0.25-35 MG-MCG tablet     omeprazole (PRILOSEC) 20 MG DR capsule     ondansetron (ZOFRAN-ODT) 4 MG ODT tab     risperiDONE (RISPERDAL) 0.5 MG tablet     VITAMIN D3 50 MCG (2000 UT) tablet     zolpidem (AMBIEN) 5 MG tablet     No current facility-administered medications for this visit.     Allergies   Allergen Reactions     Amoxicillin-Pot Clavulanate Hives     Augmentin Diarrhea     Yeast Infection in colon     Azithromycin Nausea     Azithromycin      Can't take because it contains wheat     Codeine      constipation     Gluten      Gluten Meal      Other reaction(s): Constipation  Celiac disease, abdominal pain     Food Diarrhea     Other reaction(s): Constipation  Wheat, rye, barley     /84 (BP Location: Right arm, Patient Position: Sitting, Cuff Size: Adult Large)   Pulse 98   Resp 18   Wt 110 kg (242 lb 6.4 oz)   LMP 09/09/2021   BMI 42.27 kg/m      REVIEW OF SYSTEMS  Neg except as above  Notices a skin lesion in her left thigh, which seems to be resolving.    Exam:    Constitutional: healthy, alert, active and no distress  Pelvic: Small lesion on her left, medial thigh, which appears to be a healing follicular abscess. Chaperone was present.     Lab: No results found for any visits on 09/20/21.    ASSESSMENT/PLAN :  1. Irregular menses    2. Abscess of groin, left      (N92.6) Irregular menses  (primary encounter diagnosis)  Comment:   Plan: norgestimate-ethinyl estradiol (ORTHO-CYCLEN)         0.25-35 MG-MCG tablet  Daily as directed. Call if cycles have not normalized in a few months.            (L02.214) Abscess of groin, left  Comment:   Plan:     Recommend conservative management including tub soaks, hot packing. If it gets  larger or develops signs of spreading infection would reevaluate for need for drainage.    Real Flower MD FACOG  2:20 PM 9/20/2021

## 2021-09-20 NOTE — NURSING NOTE
"Chief Complaint   Patient presents with     Consult     amenorrhea   Patient presents to clinic to discuss amenorrhea/irreg periods. Had breast reduction surgery last December, cycles have been \"off\" since then. Was having a period every other month but missed two recently and just had a \"longer, heavier\" period than normal that just ended.    Initial /84 (BP Location: Right arm, Patient Position: Sitting, Cuff Size: Adult Large)   Pulse 98   Resp 18   Wt 110 kg (242 lb 6.4 oz)   LMP 09/09/2021   BMI 42.27 kg/m   Estimated body mass index is 42.27 kg/m  as calculated from the following:    Height as of 5/5/21: 1.613 m (5' 3.5\").    Weight as of this encounter: 110 kg (242 lb 6.4 oz).  Medication Reconciliation: complete    DENA JC, LPN  "

## 2021-10-11 ENCOUNTER — ALLIED HEALTH/NURSE VISIT (OUTPATIENT)
Dept: FAMILY MEDICINE | Facility: OTHER | Age: 31
End: 2021-10-11
Attending: FAMILY MEDICINE
Payer: COMMERCIAL

## 2021-10-11 DIAGNOSIS — R05.9 COUGH: Primary | ICD-10-CM

## 2021-10-11 DIAGNOSIS — R52 BODY ACHES: ICD-10-CM

## 2021-10-11 PROCEDURE — U0003 INFECTIOUS AGENT DETECTION BY NUCLEIC ACID (DNA OR RNA); SEVERE ACUTE RESPIRATORY SYNDROME CORONAVIRUS 2 (SARS-COV-2) (CORONAVIRUS DISEASE [COVID-19]), AMPLIFIED PROBE TECHNIQUE, MAKING USE OF HIGH THROUGHPUT TECHNOLOGIES AS DESCRIBED BY CMS-2020-01-R: HCPCS | Mod: ZL

## 2021-10-11 PROCEDURE — C9803 HOPD COVID-19 SPEC COLLECT: HCPCS

## 2021-10-12 LAB — SARS-COV-2 RNA RESP QL NAA+PROBE: POSITIVE

## 2021-10-18 ENCOUNTER — MYC MEDICAL ADVICE (OUTPATIENT)
Dept: FAMILY MEDICINE | Facility: OTHER | Age: 31
End: 2021-10-18

## 2021-10-20 ENCOUNTER — MYC MEDICAL ADVICE (OUTPATIENT)
Dept: FAMILY MEDICINE | Facility: OTHER | Age: 31
End: 2021-10-20

## 2021-10-20 DIAGNOSIS — J45.41 ACUTE EXACERBATION OF MODERATE PERSISTENT EXTRINSIC ASTHMA: Primary | ICD-10-CM

## 2021-10-20 RX ORDER — PREDNISONE 20 MG/1
TABLET ORAL
Qty: 18 TABLET | Refills: 0 | Status: SHIPPED | OUTPATIENT
Start: 2021-10-20 | End: 2022-07-06

## 2021-10-23 ENCOUNTER — HEALTH MAINTENANCE LETTER (OUTPATIENT)
Age: 31
End: 2021-10-23

## 2021-10-24 ENCOUNTER — HOSPITAL ENCOUNTER (OUTPATIENT)
Dept: GENERAL RADIOLOGY | Facility: OTHER | Age: 31
End: 2021-10-24
Attending: NURSE PRACTITIONER
Payer: COMMERCIAL

## 2021-10-24 ENCOUNTER — OFFICE VISIT (OUTPATIENT)
Dept: FAMILY MEDICINE | Facility: OTHER | Age: 31
End: 2021-10-24
Attending: NURSE PRACTITIONER
Payer: COMMERCIAL

## 2021-10-24 VITALS
RESPIRATION RATE: 18 BRPM | SYSTOLIC BLOOD PRESSURE: 134 MMHG | DIASTOLIC BLOOD PRESSURE: 88 MMHG | OXYGEN SATURATION: 99 % | WEIGHT: 241.4 LBS | HEART RATE: 98 BPM | TEMPERATURE: 97.4 F | BODY MASS INDEX: 42.09 KG/M2

## 2021-10-24 DIAGNOSIS — U07.1 PNEUMONIA DUE TO COVID-19 VIRUS: Primary | ICD-10-CM

## 2021-10-24 DIAGNOSIS — U07.1 INFECTION DUE TO 2019 NOVEL CORONAVIRUS: ICD-10-CM

## 2021-10-24 DIAGNOSIS — J12.82 PNEUMONIA DUE TO COVID-19 VIRUS: Primary | ICD-10-CM

## 2021-10-24 DIAGNOSIS — T36.95XA ANTIBIOTIC-INDUCED YEAST INFECTION: ICD-10-CM

## 2021-10-24 DIAGNOSIS — B37.9 ANTIBIOTIC-INDUCED YEAST INFECTION: ICD-10-CM

## 2021-10-24 PROCEDURE — G0463 HOSPITAL OUTPT CLINIC VISIT: HCPCS

## 2021-10-24 PROCEDURE — 71046 X-RAY EXAM CHEST 2 VIEWS: CPT

## 2021-10-24 PROCEDURE — 99214 OFFICE O/P EST MOD 30 MIN: CPT | Performed by: NURSE PRACTITIONER

## 2021-10-24 RX ORDER — CEFUROXIME AXETIL 500 MG/1
500 TABLET ORAL 2 TIMES DAILY
Qty: 14 TABLET | Refills: 0 | Status: SHIPPED | OUTPATIENT
Start: 2021-10-24 | End: 2021-10-31

## 2021-10-24 RX ORDER — DOXYCYCLINE 100 MG/1
100 CAPSULE ORAL 2 TIMES DAILY
Qty: 14 CAPSULE | Refills: 0 | Status: SHIPPED | OUTPATIENT
Start: 2021-10-24 | End: 2021-10-31

## 2021-10-24 RX ORDER — FLUCONAZOLE 150 MG/1
150 TABLET ORAL ONCE
Qty: 1 TABLET | Refills: 0 | Status: SHIPPED | OUTPATIENT
Start: 2021-10-24 | End: 2021-10-24

## 2021-10-24 RX ORDER — FLUTICASONE PROPIONATE 110 UG/1
1 AEROSOL, METERED RESPIRATORY (INHALATION) 2 TIMES DAILY
Qty: 12 G | Refills: 0 | Status: SHIPPED | OUTPATIENT
Start: 2021-10-24 | End: 2022-07-06

## 2021-10-24 RX ORDER — ZOLPIDEM TARTRATE 10 MG/1
10 TABLET ORAL DAILY
COMMUNITY
Start: 2021-10-11 | End: 2023-03-14 | Stop reason: DRUGHIGH

## 2021-10-24 ASSESSMENT — PAIN SCALES - GENERAL: PAINLEVEL: NO PAIN (0)

## 2021-10-24 NOTE — PROGRESS NOTES
ASSESSMENT/PLAN:     I have reviewed the nursing notes.  I have reviewed the findings, diagnosis, plan and need for follow up with the patient.      1. Infection due to 2019 novel coronavirus    - XR Chest 2 Views    2. Pneumonia due to COVID-19 virus    - doxycycline hyclate (VIBRAMYCIN) 100 MG capsule; Take 1 capsule (100 mg) by mouth 2 times daily for 7 days  Dispense: 14 capsule; Refill: 0    - cefuroxime (CEFTIN) 500 MG tablet; Take 1 tablet (500 mg) by mouth 2 times daily for 7 days  Dispense: 14 tablet; Refill: 0    - fluticasone (FLOVENT HFA) 110 MCG/ACT inhaler; Inhale 1 puff into the lungs 2 times daily  Dispense: 12 g; Refill: 0    CXR completed and reviewed, no noted infiltrate, radiologist over read:  There is some linear atelectasis or scarring in the midportion of the left lung    Covid positive on 10/11/21    covid vaccinated    Continue prednisone taper as previously prescribed    Symptomatic treatment - Encouraged fluids, honey, elevation, humidifier,lozenges, tea, soup, topical vapor rub, etc   May use over the counter cough, cold, or expectorant PRN  May use over-the-counter Tylenol or ibuprofen PRN    Discussed warning signs/symptoms indicative of need to f/u  Follow up if symptoms persist or worsen or concerns    3. Antibiotic-induced yeast infection    - fluconazole (DIFLUCAN) 150 MG tablet; Take 1 tablet (150 mg) by mouth once for 1 dose  Dispense: 1 tablet; Refill: 0      I explained my diagnostic considerations and recommendations to the patient, who voiced understanding and agreement with the treatment plan. All questions were answered. We discussed potential side effects of any prescribed or recommended therapies, as well as expectations for response to treatments.    Negra Arvizu NP  [facility]      SUBJECTIVE:   Ita Poole is a 31 year old female who presents to clinic today for the following health issues:  Worsening breathing    HPI  Tested positive for  covid on 10/11/21.  Fevers and chills x 2 days, none since.  Body aches initially, resolved.  Intermittent headaches, mild, resolved.  Nasal congestion and sinus pressure initially, resolved.    Cough, chest tightness, chest heaviness, shortness of breath, chest congestion all worsening since 10/11. Feeling worse the past few days.  Cough is tight.  Cough is occasionally productive, tastes bad.  Loss of taste, decreased appetite.  Energy decreased initially, now back to normal.     Started on prednisone on 10/20/21 - 60 x 3, 40 x 3, 20 x 3 then 20 x 3 without noted relief.  Using Albuterol inhaler 3 puffs morning and bedtime and using 5 to 6 times during the day.  States hx of asthma and pneumonia almost yearly.  covid vaccinated      Patient Active Problem List    Diagnosis Date Noted     S/P excision of mass right index finger 2018     Priority: Medium     Cold thyroid nodule 2017     Priority: Medium     Morbid obesity (H) 2017     Priority: Medium     Irregular menses 2016     Priority: Medium     H/O  section 2014     Priority: Medium     Major depressive disorder, recurrent episode, moderate (H) 2013     Priority: Medium     Overview:   Dx added per office visit on 12 with Dr. Minnie Baltazar RN, Clinical , [facility], 2013 5:02 PM    Formatting of this note might be different from the original.  Dx added per office visit on 12 with Dr. Minnie Baltazar RN, Clinical , [facility], 2013 5:02 PM       Vitamin D deficiency 2007     Priority: Medium     Problem list name updated by automated process. Provider to review       Angioedema 2006     Priority: Medium     Problem list name updated by automated process. Provider to review       Celiac disease 2006     Priority: Medium     Abdominal pain, generalized  2006     Priority: Medium     C.diff, admitted for obs. X 24 hrs. Flagyl started as outpt. Laura Moffett MD         Depressive disorder, not elsewhere classified 10/28/2005     Priority: Medium     Followed at [facility], meds by Marnie Marsh RN       Moderate persistent asthma 2005     Priority: Medium     aap completed         Allergic rhinitis 2005     Priority: Medium     Problem list name updated by automated process. Provider to review       Migraine 2005     Priority: Medium     Problem list name updated by automated process. Provider to review       Past Medical History:   Diagnosis Date     Allergic rhinitis     No Comments Provided     Celiac disease     No Comments Provided     Mass of right index finger at the PIP joint 2018     S/P excision of mass right index finger 2018     Uncomplicated asthma     No Comments Provided      Past Surgical History:   Procedure Laterality Date      SECTION      ,4/10/14, Section     COLONOSCOPY      age 10     ESOPHAGOSCOPY, GASTROSCOPY, DUODENOSCOPY (EGD), COMBINED      [id number],ESOPHAGOGASTRODUODENOSCOPY,age 10, small bowel bx: celiac disease     EXCISE MASS UPPER EXTREMITY Right 2018    Procedure: EXCISE MASS UPPER EXTREMITY;  Right Index Finger Mass Excision;  Surgeon: Nate Allen DO;  Location: [facility] OR     LAPAROSCOPIC CHOLECYSTECTOMY      9/22/15,Cholecystectomy,Laparoscopic     S/P EXCISION OF MASS RIGHT INDEX FINGER Right 2018     Family History   Problem Relation Age of Onset     Asthma Mother         Asthma     Allergy (Severe) Mother         Allergies     Asthma Father         Asthma     Heart Disease Father 43        Heart Disease, of MI     Diabetes Father         Diabetes     Asthma Maternal Grandmother         Asthma     Diabetes Paternal Grandfather         Diabetes     Social History     Social History Narrative     Not on file     Current Outpatient Medications   Medication Sig  Dispense Refill     albuterol (PROAIR HFA/PROVENTIL HFA/VENTOLIN HFA) 108 (90 BASE) MCG/ACT Inhaler Inhale 2 puffs into the lungs every 4 hours as needed for wheezing       buPROPion (WELLBUTRIN SR) 100 MG 12 hr tablet Take 100 mg by mouth every morning       cefuroxime (CEFTIN) 500 MG tablet Take 1 tablet (500 mg) by mouth 2 times daily for 7 days 14 tablet 0     cyclobenzaprine (FLEXERIL) 10 MG tablet TAKE 1 TABLET BY MOUTH 3 TIMES DAILY IF NEEDED FOR MUSCLE SPASM. 30 tablet 3     doxycycline hyclate (VIBRAMYCIN) 100 MG capsule Take 1 capsule (100 mg) by mouth 2 times daily for 7 days 14 capsule 0     EPI E-Z PEN LYNETTE 1:1000  IJ 1 TIME ONLY 1 0     fluconazole (DIFLUCAN) 150 MG tablet Take 1 tablet (150 mg) by mouth once for 1 dose 1 tablet 0     fluticasone (FLONASE) 50 MCG/ACT nasal spray Spray 2 sprays into both nostrils daily 18 mL 11     fluticasone (FLOVENT HFA) 110 MCG/ACT inhaler Inhale 1 puff into the lungs 2 times daily 12 g 0     ibuprofen (ADVIL/MOTRIN) 800 MG tablet TAKE 1 TABLET BY MOUTH 3 TIMES DAILY IF NEEDED FOR PAIN. 90 tablet 7     LORazepam (ATIVAN) 0.5 MG tablet TAKE 1 TABLET BY MOUTH 3 TIMES WEEKLY AS NEEDED FOR PANIC ATTACKS.       LORazepam (ATIVAN) 1 MG tablet Take 1 mg by mouth every 6 hours as needed for anxiety       medical cannabis (Patient's own supply) See Admin Instructions (The purpose of this order is to document that the patient reports taking medical cannabis.  This is not a prescription, and is not used to certify that the patient has a qualifying medical condition.)       norgestimate-ethinyl estradiol (ORTHO-CYCLEN) 0.25-35 MG-MCG tablet Take 1 tablet by mouth daily 84 tablet 3     omeprazole (PRILOSEC) 20 MG DR capsule TAKE 1 CAPSULE (20 MG) BY MOUTH DAILY 90 capsule 3     ondansetron (ZOFRAN-ODT) 4 MG ODT tab Take 1 tablet (4 mg) by mouth every 8 hours as needed for nausea 15 tablet 0     predniSONE (DELTASONE) 20 MG tablet 3 tablets daily for 3 days, 2 tablets daily for 3  days, 1 tablet daily for 3 days then stop.  Take with food. 18 tablet 0     risperiDONE (RISPERDAL) 0.5 MG tablet TAKE 1 TABLET (0.5 MG) BY MOUTH TWICE A DAY AT BEDTIME AND AS NEEDED 180 tablet 3     VITAMIN D3 50 MCG (2000 UT) tablet Take 1 tablet by mouth daily       zolpidem (AMBIEN) 10 MG tablet Take 10 mg by mouth daily       Allergies   Allergen Reactions     Amoxicillin-Pot Clavulanate Diarrhea     Augmentin Diarrhea     Yeast Infection in colon     Azithromycin Nausea     Azithromycin      Can't take because it contains wheat     Codeine      constipation     Gluten      Gluten Meal      Other reaction(s): Constipation  Celiac disease, abdominal pain     Food Diarrhea     Other reaction(s): Constipation  Wheat, rye, barley           OBJECTIVE:     /88 (BP Location: Right arm, Patient Position: Sitting, Cuff Size: Adult Large)   Pulse 98   Temp 97.4  F (36.3  C) (Tympanic)   Resp 18   Wt 109.5 kg (241 lb 6.4 oz)   LMP 10/13/2021   SpO2 99%   BMI 42.09 kg/m    Body mass index is 42.09 kg/m .     Physical Exam  General Appearance: Well appearing adult female, appropriate appearance for age. No acute distress  Orophayrnx:voice clear.    Nose:  No noted drainage or congestion   Neck: supple without adenopathy  Respiratory: normal chest wall and respirations.  Normal effort.  Diffuse course wheezing and rhonchi to auscultation bilaterally.  Frequent deep tight harsh barky cough appreciated.  Cardiac: RRR with no murmurs    Musculoskeletal:  Equal movement of bilateral upper extremities.  Equal movement of bilateral lower extremities.  Normal gait.  Psychological: normal affect, alert, oriented, and pleasant.       Imaging:  Results for orders placed or performed in visit on 10/24/21   XR Chest 2 Views     Status: None    Narrative    PROCEDURE:  XR CHEST 2 VW    HISTORY:  Infection due to 2019 novel coronavirus.     COMPARISON:  2019    FINDINGS:   The cardiac silhouette is normal in size. The  pulmonary vasculature is  normal.  There is some linear atelectasis or scarring in the  midportion of the left lung. No pleural effusion or pneumothorax.      Impression    IMPRESSION:  Atelectasis or scarring in the left lung      ESTRELLITA RASHID MD         SYSTEM ID:  [id number]

## 2021-10-24 NOTE — NURSING NOTE
"Chief Complaint   Patient presents with     Asthma     Patient presents to the clinic today for worsening asthma. Her symptoms started getting worst 10/11/21.  FOOD SECURITY SCREENING QUESTIONS  Hunger Vital Signs:  Within the past 12 months we worried whether our food would run out before we got money to buy more. Never  Within the past 12 months the food we bought just didn't last and we didn't have money to get more. Never  Yane Greco LPN 10/24/2021 11:41 AM      Initial /88 (BP Location: Right arm, Patient Position: Sitting, Cuff Size: Adult Large)   Pulse 98   Temp 97.4  F (36.3  C) (Tympanic)   Resp 18   Wt 109.5 kg (241 lb 6.4 oz)   LMP 10/13/2021   SpO2 99%   BMI 42.09 kg/m   Estimated body mass index is 42.09 kg/m  as calculated from the following:    Height as of 5/5/21: 1.613 m (5' 3.5\").    Weight as of this encounter: 109.5 kg (241 lb 6.4 oz).  Medication Reconciliation: complete    Yane Greco LPN  "

## 2021-11-11 NOTE — PROGRESS NOTES
[facility]          OUTPATIENT PHYSICAL THERAPY ORTHOPEDIC EVALUATION  PLAN OF TREATMENT FOR OUTPATIENT REHABILITATION  (COMPLETE FOR INITIAL CLAIMS ONLY)  Patient's Last Name, First Name, M.I.  YOB: 1990  Ita Poole    Provider s Name:  [facility]   Medical Record No.  [id number]   Start of Care Date:  05/26/21   Onset Date:      Type:     _X__PT   ___OT   ___SLP Medical Diagnosis:   Locking of R knee     PT Diagnosis:  impaired mobility   Visits from SOC:  1      _________________________________________________________________________________  Plan of Treatment/Functional Goals:  gait training,manual therapy,joint mobilization,ROM,strengthening,stretching     Ultrasound,Electrical stimulation,Cryotherapy     Goals  Goal Identifier: Ambulation  Goal Description: Pt will be able to ambulate without gait compensation at self-selected pace for 20 minutes in order to resume prior level of function  Target Date: 07/10/21    Goal Identifier: Stairs  Goal Description: Pt will be able to reciprocally navigate stairs without loss of balance or pain  Target Date: 07/10/21    Goal Identifier: Locking  Goal Description: Pt will report no locking of knee for 2 weeks in order to improve her overall levels of function and mobility  Target Date: 07/10/21    Goal Identifier: kneeling  Goal Description: Pt will be able transition in and out of kneeling position without increasing pain above 3  Target Date: 07/10/21    Therapy Frequency:  other (see comments) (1-2x/week)  Predicted Duration of Therapy Intervention:  6 weeks    Orville Gracia PT                 I CERTIFY THE NEED FOR THESE SERVICES FURNISHED UNDER        THIS PLAN OF TREATMENT AND WHILE UNDER MY CARE     (Physician co-signature of this document indicates review and certification of the therapy plan).                       Certification Date From:    5/26/21  Certification Date To:    6/11/21    Referring Provider:  Dr. Sanchez    Initial Assessment        See Epic Evaluation Start of Care Date: 05/26/21

## 2021-12-19 ENCOUNTER — NURSE TRIAGE (OUTPATIENT)
Dept: NURSING | Facility: CLINIC | Age: 31
End: 2021-12-19
Payer: COMMERCIAL

## 2021-12-19 NOTE — TELEPHONE ENCOUNTER
Patient calling with symptoms of cough and body aches. No fever. May have been exposed to Covid a work.    Patient had Covid back in September, so may still test positive from prior infection.    Per protocol, recommendations are for home care. Covid testing advised.  Care advice given. Advised patient to call back if they develop any new or worsening symptoms. Patient verbalizes understanding and agrees with plan of care.    Sarahi Michel RN  12/19/21 5:42 PM  [employer] Nurse Advisor    Reason for Disposition    [1] Symptoms of COVID-19 (e.g., cough, fever, SOB, or others) AND [2] within 14 days of EXPOSURE (close contact) with diagnosed or suspected COVID-19 patient    COVID-19 Testing, questions about    Additional Information    Negative: SEVERE difficulty breathing (e.g., struggling for each breath, speaks in single words)    Negative: Difficult to awaken or acting confused (e.g., disoriented, slurred speech)    Negative: Bluish (or gray) lips or face now    Negative: Shock suspected (e.g., cold/pale/clammy skin, too weak to stand, low BP, rapid pulse)    Negative: Sounds like a life-threatening emergency to the triager    Negative: [1] COVID-19 exposure AND [2] no symptoms    Negative: COVID-19 vaccine reaction suspected (e.g., fever, headache, muscle aches) occurring 1 to 3 days after getting vaccine    Negative: COVID-19 vaccine, questions about    Negative: [1] Lives with someone known to have influenza (flu test positive) AND [2] flu-like symptoms (e.g., cough, runny nose, sore throat, SOB; with or without fever)    Negative: [1] Adult with possible COVID-19 symptoms AND [2] triager concerned about severity of symptoms or other causes    Negative: COVID-19 and breastfeeding, questions about    Negative: SEVERE or constant chest pain or pressure (Exception: mild central chest pain, present only when coughing)    Negative: MODERATE difficulty breathing (e.g., speaks in phrases, SOB even at rest,  pulse 100-120)    Negative: [1] Headache AND [2] stiff neck (can't touch chin to chest)    Negative: MILD difficulty breathing (e.g., minimal/no SOB at rest, SOB with walking, pulse <100)    Negative: Chest pain or pressure    Negative: Patient sounds very sick or weak to the triager    Negative: Fever > 103 F (39.4 C)    Negative: [1] Fever > 101 F (38.3 C) AND [2] age > 60 years    Negative: [1] Fever > 100.0 F (37.8 C) AND [2] bedridden (e.g., nursing home patient, CVA, chronic illness, recovering from surgery)    Negative: HIGH RISK for severe COVID complications (e.g., age > 64 years, obesity with BMI > 25, pregnant, chronic lung disease or other chronic medical condition)  (Exception: Already seen by PCP and no new or worsening symptoms.)    Negative: [1] HIGH RISK patient AND [2] influenza is widespread in the community AND [3] ONE OR MORE respiratory symptoms: cough, sore throat, runny or stuffy nose    Negative: [1] HIGH RISK patient AND [2] influenza exposure within the last 7 days AND [3] ONE OR MORE respiratory symptoms: cough, sore throat, runny or stuffy nose    Negative: [1] COVID-19 infection suspected by caller or triager AND [2] mild symptoms (cough, fever, or others) AND [3] negative COVID-19 rapid test    Negative: Fever present > 3 days (72 hours)    Negative: [1] Fever returns after gone for over 24 hours AND [2] symptoms worse or not improved    Negative: [1] Continuous (nonstop) coughing interferes with work or school AND [2] no improvement using cough treatment per protocol    Negative: Cough present > 3 weeks    Negative: COVID-19 Home Isolation, questions about    Negative: [1] COVID-19 diagnosed AND [2] has mild nausea, vomiting or diarrhea    Negative: [1] COVID-19 diagnosed by doctor (or NP/PA) AND [2] mild symptoms (e.g., cough, fever, others) AND [3] no complications or SOB    Negative: [1] COVID-19 diagnosed by positive lab test AND [2] mild symptoms (e.g., cough, fever, others) AND  [3] no complications or SOB    Negative: [1] COVID-19 diagnosed by positive lab test AND [2] NO symptoms (e.g., cough, fever, others)    Protocols used: CORONAVIRUS (COVID-19) EXPOSURE-A- 8.25.2021, CORONAVIRUS (COVID-19) DIAGNOSED OR [id number] 8.25.2021    COVID 19 Nurse Triage Plan/Patient Instructions    Please be aware that novel coronavirus (COVID-19) may be circulating in the community. If you develop symptoms such as fever, cough, or SOB or if you have concerns about the presence of another infection including coronavirus (COVID-19), please contact your health care provider or visit [email or web address].     Disposition/Instructions    Home care recommended. Follow home care protocol based instructions.    Thank you for taking steps to prevent the spread of this virus.  o Limit your contact with others.  o Wear a simple mask to cover your cough.  o Wash your hands well and often.    Resources    [facility]: About COVID-19: [email or web address]    CDC: What to Do If You're Sick: www.cdc.gov/coronavirus/2019-ncov/about/steps-when-sick.html    CDC: Ending Home Isolation: www.cdc.gov/coronavirus/2019-ncov/hcp/disposition-in-home-patients.html     CDC: Caring for Someone: www.cdc.gov/coronavirus/2019-ncov/if-you-are-sick/care-for-someone.html     [facility]: Interim Guidance for Hospital Discharge to Home: www.health.[facility].[place]/diseases/coronavirus/hcp/hospdischarge.pdf    [facility] clinical trials (COVID-19 research studies): [email or web address]     Below are the COVID-19 hotlines at the [place] Department of Health ([facility]). Interpreters are available.   o For health questions: Call 440-315-3975 or 1-676.602.1748 (7 a.m. to 7 p.m.)  o For questions about schools and childcare: Call [phone] or 1-697.522.3433 (7 a.m. to 7 p.m.)

## 2021-12-20 ENCOUNTER — OFFICE VISIT (OUTPATIENT)
Dept: FAMILY MEDICINE | Facility: OTHER | Age: 31
End: 2021-12-20
Payer: COMMERCIAL

## 2021-12-20 VITALS
SYSTOLIC BLOOD PRESSURE: 146 MMHG | DIASTOLIC BLOOD PRESSURE: 102 MMHG | WEIGHT: 238.3 LBS | RESPIRATION RATE: 22 BRPM | HEART RATE: 118 BPM | BODY MASS INDEX: 42.22 KG/M2 | TEMPERATURE: 101.2 F | OXYGEN SATURATION: 97 % | HEIGHT: 63 IN

## 2021-12-20 DIAGNOSIS — J98.8 VIRAL RESPIRATORY ILLNESS: Primary | ICD-10-CM

## 2021-12-20 DIAGNOSIS — R52 PAINFUL COUGH: ICD-10-CM

## 2021-12-20 DIAGNOSIS — B97.89 VIRAL RESPIRATORY ILLNESS: Primary | ICD-10-CM

## 2021-12-20 DIAGNOSIS — R50.9 FEVER AND CHILLS: ICD-10-CM

## 2021-12-20 DIAGNOSIS — R05.8 PAINFUL COUGH: ICD-10-CM

## 2021-12-20 DIAGNOSIS — R06.02 SHORTNESS OF BREATH: ICD-10-CM

## 2021-12-20 PROCEDURE — 99213 OFFICE O/P EST LOW 20 MIN: CPT | Performed by: NURSE PRACTITIONER

## 2021-12-20 PROCEDURE — C9803 HOPD COVID-19 SPEC COLLECT: HCPCS | Performed by: NURSE PRACTITIONER

## 2021-12-20 PROCEDURE — U0003 INFECTIOUS AGENT DETECTION BY NUCLEIC ACID (DNA OR RNA); SEVERE ACUTE RESPIRATORY SYNDROME CORONAVIRUS 2 (SARS-COV-2) (CORONAVIRUS DISEASE [COVID-19]), AMPLIFIED PROBE TECHNIQUE, MAKING USE OF HIGH THROUGHPUT TECHNOLOGIES AS DESCRIBED BY CMS-2020-01-R: HCPCS | Mod: ZL | Performed by: NURSE PRACTITIONER

## 2021-12-20 PROCEDURE — G0463 HOSPITAL OUTPT CLINIC VISIT: HCPCS

## 2021-12-20 RX ORDER — BENZONATATE 100 MG/1
100 CAPSULE ORAL 3 TIMES DAILY PRN
Qty: 42 CAPSULE | Refills: 0 | Status: SHIPPED | OUTPATIENT
Start: 2021-12-20 | End: 2022-07-06

## 2021-12-20 ASSESSMENT — MIFFLIN-ST. JEOR: SCORE: 1770.05

## 2021-12-20 ASSESSMENT — PAIN SCALES - GENERAL: PAINLEVEL: EXTREME PAIN (8)

## 2021-12-20 NOTE — PROGRESS NOTES
ASSESSMENT/PLAN:     I have reviewed the nursing notes.  I have reviewed the findings, diagnosis, plan and need for follow up with the patient.      1. Fever and chills    - Symptomatic; Yes; 12/17/2021 COVID-19 Virus (Coronavirus) by PCR Nose    2. Painful cough    - Symptomatic; Yes; 12/17/2021 COVID-19 Virus (Coronavirus) by PCR Nose    - benzonatate (TESSALON) 100 MG capsule; Take 1 capsule (100 mg) by mouth 3 times daily as needed for cough  Dispense: 42 capsule; Refill: 0    3. Shortness of breath    - Symptomatic; Yes; 12/17/2021 COVID-19 Virus (Coronavirus) by PCR Nose    Hx of intermittent asthma   Continue Albuterol inhaler Q 4 hours PRN  Add Flovent inhaler BID until symptoms resolve     4. Viral respiratory illness    Discussed with patient that symptoms and exam are consistent with viral illness.    No clinical indications for antibiotic treatment at this time.    Covid PCR test pending  Covid vaccinated including booster  Influenza vaccinated     Symptomatic treatment - Encouraged fluids, salt water gargles, honey, elevation, humidifier, saline nasal spray, sinus rinse/netti pot, lozenges, tea, soup, smoothies, popsicles, topical vapor rub, rest, etc   May use over the counter cough or cold medication PRN  May use over-the-counter Tylenol or ibuprofen PRN    Discussed warning signs/symptoms indicative of need to f/u  Follow up if symptoms persist or worsen or concerns      I explained my diagnostic considerations and recommendations to the patient, who voiced understanding and agreement with the treatment plan. All questions were answered. We discussed potential side effects of any prescribed or recommended therapies, as well as expectations for response to treatments.    Negra Arvizu NP  [facility] [place]      SUBJECTIVE:   Ita Poole is a 31 year old female who presents to clinic today for the following health issues:  Fever and cough    HPI    Cough for the past 3 days.  Non  productive cough.   Cough is deep in chest.  No burning in chest.  Cough is frequent.  Cough is worse with laying down, becomes more persistent.  Pain in chest with coughing.  Chest heaviness.  No chest tightness.  Feeling short of breath with coughing and activity.  No wheezing.  Using Albuterol inhaler with some relief.  Generalized body aches started yesterday.  Woke up with chills and sweats followed by fever.  Chills persisting.  Fever started this morning low grade and then up to 102.  Vomited up bile and phlegm this morning after coughing.  No nausea.  Appetite decreased.  Pain in throat only with coughing.  Fatigued.  Slight headache.  Ear pain started this morning.  Stuffy nose started this morning.   No loss of taste or smell.     Hx of Covid 10/11/21. States she feels way worse this time around and would like to get tested for Covid in case she has a new strain.  Treated for possible Covid pneumonia on 10/24/21 due to worsening symptoms.  Covid vaccinated including booster - April, May, and 2021  Influenza vaccinated 21  Son with cough that is improving  Using Albuterol inhaler  She has Flovent inhaler from previous Covid infection (not currently using)  Taking Ibuprofen with improvement of body aches      Past Medical History:   Diagnosis Date     Allergic rhinitis     No Comments Provided     Celiac disease     No Comments Provided     Mass of right index finger at the PIP joint 2018     S/P excision of mass right index finger 2018     Uncomplicated asthma     No Comments Provided     Past Surgical History:   Procedure Laterality Date      SECTION      ,4/10/14, Section     COLONOSCOPY      age 10     ESOPHAGOSCOPY, GASTROSCOPY, DUODENOSCOPY (EGD), COMBINED      [id number],ESOPHAGOGASTRODUODENOSCOPY,age 10, small bowel bx: celiac disease     EXCISE MASS UPPER EXTREMITY Right 2018    Procedure: EXCISE MASS UPPER EXTREMITY;  Right Index Finger Mass Excision;   Surgeon: Nate Allen DO;  Location: [facility] OR     LAPAROSCOPIC CHOLECYSTECTOMY      9/22/15,Cholecystectomy,Laparoscopic     S/P EXCISION OF MASS RIGHT INDEX FINGER Right 02/28/2018     Social History     Tobacco Use     Smoking status: Never Smoker     Smokeless tobacco: Never Used     Tobacco comment: Quit smoking: passive exposure   Substance Use Topics     Alcohol use: Yes     Alcohol/week: 0.0 standard drinks     Comment: social      Current Outpatient Medications   Medication Sig Dispense Refill     albuterol (PROAIR HFA/PROVENTIL HFA/VENTOLIN HFA) 108 (90 BASE) MCG/ACT Inhaler Inhale 2 puffs into the lungs every 4 hours as needed for wheezing       buPROPion (WELLBUTRIN SR) 100 MG 12 hr tablet Take 100 mg by mouth every morning       cyclobenzaprine (FLEXERIL) 10 MG tablet TAKE 1 TABLET BY MOUTH 3 TIMES DAILY IF NEEDED FOR MUSCLE SPASM. 30 tablet 3     EPI E-Z PEN LYNETTE 1:1000  IJ 1 TIME ONLY 1 0     fluticasone (FLONASE) 50 MCG/ACT nasal spray Spray 2 sprays into both nostrils daily 18 mL 11     fluticasone (FLOVENT HFA) 110 MCG/ACT inhaler Inhale 1 puff into the lungs 2 times daily 12 g 0     ibuprofen (ADVIL/MOTRIN) 800 MG tablet TAKE 1 TABLET BY MOUTH 3 TIMES DAILY IF NEEDED FOR PAIN. 90 tablet 7     LORazepam (ATIVAN) 0.5 MG tablet TAKE 1 TABLET BY MOUTH 3 TIMES WEEKLY AS NEEDED FOR PANIC ATTACKS.       LORazepam (ATIVAN) 1 MG tablet Take 1 mg by mouth every 6 hours as needed for anxiety       medical cannabis (Patient's own supply) See Admin Instructions (The purpose of this order is to document that the patient reports taking medical cannabis.  This is not a prescription, and is not used to certify that the patient has a qualifying medical condition.)       norgestimate-ethinyl estradiol (ORTHO-CYCLEN) 0.25-35 MG-MCG tablet Take 1 tablet by mouth daily 84 tablet 3     omeprazole (PRILOSEC) 20 MG DR capsule TAKE 1 CAPSULE (20 MG) BY MOUTH DAILY 90 capsule 3     ondansetron (ZOFRAN-ODT) 4 MG ODT tab  "Take 1 tablet (4 mg) by mouth every 8 hours as needed for nausea 15 tablet 0     risperiDONE (RISPERDAL) 0.5 MG tablet TAKE 1 TABLET (0.5 MG) BY MOUTH TWICE A DAY AT BEDTIME AND AS NEEDED 180 tablet 3     VITAMIN D3 50 MCG (2000 UT) tablet Take 1 tablet by mouth daily       zolpidem (AMBIEN) 10 MG tablet Take 10 mg by mouth daily       predniSONE (DELTASONE) 20 MG tablet 3 tablets daily for 3 days, 2 tablets daily for 3 days, 1 tablet daily for 3 days then stop.  Take with food. (Patient not taking: Reported on 12/20/2021) 18 tablet 0     Allergies   Allergen Reactions     Amoxicillin-Pot Clavulanate Diarrhea     Augmentin Diarrhea     Yeast Infection in colon     Azithromycin Nausea     Azithromycin      Can't take because it contains wheat     Codeine      constipation     Gluten      Gluten Meal      Other reaction(s): Constipation  Celiac disease, abdominal pain     Food Diarrhea     Other reaction(s): Constipation  Wheat, rye, barley         Past medical history, past surgical history, current medications and allergies reviewed and accurate to the best of my knowledge.        OBJECTIVE:     BP (!) 146/102 (BP Location: Left arm)   Pulse 118   Temp (!) 101.2  F (38.4  C) (Tympanic)   Resp 22   Ht 1.6 m (5' 3\")   Wt 108.1 kg (238 lb 4.8 oz)   LMP 11/22/2021 (Approximate)   SpO2 97%   Breastfeeding No   BMI 42.21 kg/m    Body mass index is 42.21 kg/m .     Physical Exam  General Appearance: miserable and ill appearing adult female, appropriate appearance for age. No acute distress  Ears: Left TM intact, translucent with bony landmarks appreciated, no erythema, no effusion, no bulging, no purulence.  Right TM intact, translucent with bony landmarks appreciated, no erythema, no effusion, no bulging, no purulence.  Left auditory canal clear.  Right auditory canal clear.  Normal external ears, non tender.  Eyes: conjunctivae normal without erythema or irritation, corneas clear, no drainage or crusting, no " eyelid swelling, pupils equal   Orophayrnx: moist mucous membranes, posterior pharynx without erythema, tonsils without hypertrophy, tonsils without erythema, no tonsillar exudates, no oral lesions, no palate petechiae, no post nasal drip seen, no trismus, voice clear.    Sinuses:  No sinus tenderness upon palpation of the frontal or maxillary sinuses  Nose: nares with erythema, mild clear drainage   Neck: supple without adenopathy  Respiratory: normal chest wall and respirations.  Normal effort.  Harsh barky cough to auscultation bilaterally, no wheezing, crackles or rhonchi.  No increased work of breathing.  Frequent harsh barky cough appreciated.  Cardiac: RRR with no murmurs  Musculoskeletal:  Equal movement of bilateral upper extremities.  Equal movement of bilateral lower extremities.  Normal gait.    Psychological: normal affect, alert, oriented, and pleasant.       Labs:  Covid PCR test pending

## 2021-12-20 NOTE — NURSING NOTE
Pt presents to clinic today for fever, cough and sore ears.     FOOD SECURITY SCREENING QUESTIONS:    The next two questions are to help us understand your food security.  If you are feeling you need any assistance in this area, we have resources available to support you today.    Hunger Vital Signs:  Within the past 12 months we worried whether our food would run out before we got money to buy more. Never  Within the past 12 months the food we bought just didn't last and we didn't have money to get more. Never    Medication Reconciliation: complete  Italo Ruiz LPN,LPN on 12/20/2021 at 10:35 AM   2

## 2021-12-21 LAB — SARS-COV-2 RNA RESP QL NAA+PROBE: NEGATIVE

## 2022-02-12 ENCOUNTER — HEALTH MAINTENANCE LETTER (OUTPATIENT)
Age: 32
End: 2022-02-12

## 2022-02-28 ENCOUNTER — OFFICE VISIT (OUTPATIENT)
Dept: FAMILY MEDICINE | Facility: OTHER | Age: 32
End: 2022-02-28
Attending: NURSE PRACTITIONER
Payer: COMMERCIAL

## 2022-02-28 ENCOUNTER — MYC MEDICAL ADVICE (OUTPATIENT)
Dept: FAMILY MEDICINE | Facility: OTHER | Age: 32
End: 2022-02-28

## 2022-02-28 VITALS
BODY MASS INDEX: 41.89 KG/M2 | OXYGEN SATURATION: 96 % | TEMPERATURE: 98.2 F | SYSTOLIC BLOOD PRESSURE: 126 MMHG | DIASTOLIC BLOOD PRESSURE: 64 MMHG | WEIGHT: 236.4 LBS | HEART RATE: 106 BPM | HEIGHT: 63 IN | RESPIRATION RATE: 20 BRPM

## 2022-02-28 DIAGNOSIS — J06.9 VIRAL URI WITH COUGH: ICD-10-CM

## 2022-02-28 DIAGNOSIS — J45.40 MODERATE PERSISTENT ASTHMA WITHOUT COMPLICATION: Primary | ICD-10-CM

## 2022-02-28 DIAGNOSIS — J45.21 MILD INTERMITTENT ASTHMA WITH (ACUTE) EXACERBATION: Primary | ICD-10-CM

## 2022-02-28 PROCEDURE — G0463 HOSPITAL OUTPT CLINIC VISIT: HCPCS

## 2022-02-28 PROCEDURE — 99213 OFFICE O/P EST LOW 20 MIN: CPT | Performed by: NURSE PRACTITIONER

## 2022-02-28 ASSESSMENT — PAIN SCALES - GENERAL: PAINLEVEL: MODERATE PAIN (4)

## 2022-02-28 NOTE — LETTER
[facility]  [address]  [place] [address]  Phone: [phone]  Fax: [phone]    February 28, 2022        Ita Poole  [address] [place] [id number]          To whom it may concern:    RE: Ita Poole    Patient was seen and treated today at our clinic and missed work due to cough.    Please contact me for questions or concerns.      Sincerely,        Negra Arvizu NP

## 2022-02-28 NOTE — NURSING NOTE
"Chief Complaint   Patient presents with     Cough     Nasal Congestion     Patient is here for a cough, congestion and body aches that started Saturday.    Initial /64   Pulse 106   Temp 98.2  F (36.8  C) (Tympanic)   Resp 20   Ht 1.6 m (5' 3\")   Wt 107.2 kg (236 lb 6.4 oz)   SpO2 96%   BMI 41.88 kg/m   Estimated body mass index is 41.88 kg/m  as calculated from the following:    Height as of this encounter: 1.6 m (5' 3\").    Weight as of this encounter: 107.2 kg (236 lb 6.4 oz).  Medication Reconciliation: complete      Rama Muñoz LPN  "

## 2022-02-28 NOTE — PROGRESS NOTES
ASSESSMENT/PLAN:     I have reviewed the nursing notes.  I have reviewed the findings, diagnosis, plan and need for follow up with the patient.    1. Mild intermittent asthma with (acute) exacerbation    Restart Flovent inhaler BID PRN  Restart Albuterol inhaler BID PRN    Discussed warning signs/symptoms indicative of need to f/u  Follow up if symptoms persist or worsen or concerns    2. Viral URI with cough    Discussed with patient that symptoms and exam are consistent with viral illness.    No clinical indications for antibiotic treatment at this time.    Patient reports negative rapid covid test at home this morning.  Patient declines any lab testing today    Patient has Tessalon Pearles and may use as prescribed TID PRN    Symptomatic treatment - Encouraged fluids, salt water gargles, honey, elevation, humidifier, saline nasal spray, sinus rinse/netti pot, lozenges, tea, soup, smoothies, popsicles, topical vapor rub, rest, etc     May use over-the-counter Tylenol or ibuprofen PRN    Discussed warning signs/symptoms indicative of need to f/u  Follow up if symptoms persist or worsen or concerns      I explained my diagnostic considerations and recommendations to the patient, who voiced understanding and agreement with the treatment plan. All questions were answered. We discussed potential side effects of any prescribed or recommended therapies, as well as expectations for response to treatments.    Negra Arvizu NP  [facility]      SUBJECTIVE:   Ita Poole is a 31 year old female who presents to clinic today for the following health issues:  Cough    HPI  Symptoms started 2 days ago including cough, chest congestion, chest tightness, chest heaviness, nasal drainage/congestion, and body aches.  No shortness of breath.  Cough is non productive.  No headaches.  Mild ear discomfort on left side.  Vomiting x 1 (4 days ago).  Appetite decreased, low.  Drinking fluids well especially water.   No diarrhea.  Energy fair, about medium.  No chills.  No fevers.    Using Albuterol inhaler twice yesterday.    She has a Flovent inhaler - she will start using now.  She has Flonase if needed.   She has Tessalon pearls if needed.  No cough or cold medications.     No known exposures  Both her children has same URI symptoms        Past Medical History:   Diagnosis Date     Allergic rhinitis     No Comments Provided     Celiac disease     No Comments Provided     Mass of right index finger at the PIP joint 2018     S/P excision of mass right index finger 2018     Uncomplicated asthma     No Comments Provided     Past Surgical History:   Procedure Laterality Date      SECTION      ,4/10/14, Section     COLONOSCOPY      age 10     ESOPHAGOSCOPY, GASTROSCOPY, DUODENOSCOPY (EGD), COMBINED      [id number],ESOPHAGOGASTRODUODENOSCOPY,age 10, small bowel bx: celiac disease     EXCISE MASS UPPER EXTREMITY Right 2018    Procedure: EXCISE MASS UPPER EXTREMITY;  Right Index Finger Mass Excision;  Surgeon: Nate Allen DO;  Location: [facility] OR     LAPAROSCOPIC CHOLECYSTECTOMY      9/22/15,Cholecystectomy,Laparoscopic     S/P EXCISION OF MASS RIGHT INDEX FINGER Right 2018     Social History     Tobacco Use     Smoking status: Never Smoker     Smokeless tobacco: Never Used     Tobacco comment: Quit smoking: passive exposure   Substance Use Topics     Alcohol use: Yes     Alcohol/week: 0.0 standard drinks     Comment: social      Current Outpatient Medications   Medication Sig Dispense Refill     albuterol (PROAIR HFA/PROVENTIL HFA/VENTOLIN HFA) 108 (90 BASE) MCG/ACT Inhaler Inhale 2 puffs into the lungs every 4 hours as needed for wheezing       buPROPion (WELLBUTRIN SR) 100 MG 12 hr tablet Take 100 mg by mouth every morning       cyclobenzaprine (FLEXERIL) 10 MG tablet TAKE 1 TABLET BY MOUTH 3 TIMES DAILY IF NEEDED FOR MUSCLE SPASM. 30 tablet 3     fluticasone (FLONASE) 50 MCG/ACT nasal spray  Spray 2 sprays into both nostrils daily 18 mL 11     fluticasone (FLOVENT HFA) 110 MCG/ACT inhaler Inhale 1 puff into the lungs 2 times daily 12 g 0     LORazepam (ATIVAN) 0.5 MG tablet TAKE 1 TABLET BY MOUTH 3 TIMES WEEKLY AS NEEDED FOR PANIC ATTACKS.       LORazepam (ATIVAN) 1 MG tablet Take 1 mg by mouth every 6 hours as needed for anxiety       medical cannabis (Patient's own supply) See Admin Instructions (The purpose of this order is to document that the patient reports taking medical cannabis.  This is not a prescription, and is not used to certify that the patient has a qualifying medical condition.)       norgestimate-ethinyl estradiol (ORTHO-CYCLEN) 0.25-35 MG-MCG tablet Take 1 tablet by mouth daily 84 tablet 3     risperiDONE (RISPERDAL) 0.5 MG tablet TAKE 1 TABLET (0.5 MG) BY MOUTH TWICE A DAY AT BEDTIME AND AS NEEDED 180 tablet 3     VITAMIN D3 50 MCG (2000 UT) tablet Take 1 tablet by mouth daily       zolpidem (AMBIEN) 10 MG tablet Take 10 mg by mouth daily       benzonatate (TESSALON) 100 MG capsule Take 1 capsule (100 mg) by mouth 3 times daily as needed for cough (Patient not taking: Reported on 2/28/2022) 42 capsule 0     EPI E-Z PEN LYNETTE 1:1000  IJ 1 TIME ONLY (Patient not taking: Reported on 2/28/2022) 1 0     ibuprofen (ADVIL/MOTRIN) 800 MG tablet TAKE 1 TABLET BY MOUTH 3 TIMES DAILY IF NEEDED FOR PAIN. (Patient not taking: Reported on 2/28/2022) 90 tablet 7     omeprazole (PRILOSEC) 20 MG DR capsule TAKE 1 CAPSULE (20 MG) BY MOUTH DAILY (Patient not taking: Reported on 2/28/2022) 90 capsule 3     ondansetron (ZOFRAN-ODT) 4 MG ODT tab Take 1 tablet (4 mg) by mouth every 8 hours as needed for nausea (Patient not taking: Reported on 2/28/2022) 15 tablet 0     predniSONE (DELTASONE) 20 MG tablet 3 tablets daily for 3 days, 2 tablets daily for 3 days, 1 tablet daily for 3 days then stop.  Take with food. (Patient not taking: Reported on 12/20/2021) 18 tablet 0     Allergies   Allergen Reactions      "Amoxicillin-Pot Clavulanate Diarrhea     Augmentin Diarrhea     Yeast Infection in colon     Azithromycin Nausea     Azithromycin      Can't take because it contains wheat     Codeine      constipation     Gluten      Gluten Meal      Other reaction(s): Constipation  Celiac disease, abdominal pain     Food Diarrhea     Other reaction(s): Constipation  Wheat, rye, barley         Past medical history, past surgical history, current medications and allergies reviewed and accurate to the best of my knowledge.        OBJECTIVE:     /64   Pulse 106   Temp 98.2  F (36.8  C) (Tympanic)   Resp 20   Ht 1.6 m (5' 3\")   Wt 107.2 kg (236 lb 6.4 oz)   SpO2 96%   BMI 41.88 kg/m    Body mass index is 41.88 kg/m .     Physical Exam  General Appearance: Mildly appearing miserable adult female, non toxic appearance, appropriate appearance for age. No acute distress  Ears: Left TM intact with bony landmarks appreciated, no erythema, no effusion, no bulging, no purulence.  Right TM intact with bony landmarks appreciated, no erythema, no effusion, no bulging, no purulence.  Left auditory canal clear without drainage or bleeding.  Right auditory canal clear without drainage or bleeding.  Normal external ears, non tender.  Eyes: conjunctivae normal without erythema or irritation, corneas clear, no drainage or crusting, no eyelid swelling, pupils equal   Orophayrnx: moist mucous membranes, pharynx without erythema, tonsils without hypertrophy, tonsils without erythema, no tonsillar exudates, no oral lesions, no palate petechiae, no post nasal drip seen, no trismus, voice clear.    Nose:  Mild drainage and congestion   Neck: supple without adenopathy  Respiratory: normal chest wall and respirations.  Normal effort.  Diffuse wheezing to auscultation bilaterally.  No increased work of breathing.  Occasional congested cough appreciated.  Cardiac: RRR with no murmurs  Musculoskeletal:  Equal movement of bilateral upper extremities.  " Equal movement of bilateral lower extremities.  Normal gait.    Psychological: normal affect, alert, oriented, and pleasant.

## 2022-03-02 ENCOUNTER — TELEPHONE (OUTPATIENT)
Dept: FAMILY MEDICINE | Facility: OTHER | Age: 32
End: 2022-03-02
Payer: COMMERCIAL

## 2022-03-02 DIAGNOSIS — J22 ACUTE RESPIRATORY INFECTION: Primary | ICD-10-CM

## 2022-03-02 DIAGNOSIS — J45.41 MODERATE PERSISTENT ASTHMA WITH ACUTE EXACERBATION: ICD-10-CM

## 2022-03-02 RX ORDER — PREDNISONE 20 MG/1
TABLET ORAL
Qty: 13 TABLET | Refills: 0 | Status: SHIPPED | OUTPATIENT
Start: 2022-03-02 | End: 2022-03-10

## 2022-03-02 RX ORDER — DOXYCYCLINE 100 MG/1
100 CAPSULE ORAL 2 TIMES DAILY
Qty: 14 CAPSULE | Refills: 0 | Status: SHIPPED | OUTPATIENT
Start: 2022-03-02 | End: 2022-03-09

## 2022-03-02 NOTE — TELEPHONE ENCOUNTER
Patient was seen in [facility] on 2/28/22.    Patient with worsening cough and now fevers the past 3 days.  Patient is requesting antibiotic and prednisone.  Patient with multiple antibiotic intolerances and allergies.  Patient with asthma not responding to inhalers.  Rx for Doxycycline and Prednisone sent to [facility].  Follow up if any worsening or concerns.  Negra Arvizu NP on 3/2/2022 at 12:20 PM      
Reason for call: Medication or medication refill    Name of medication requested: Antiobiotic      What pharmacy do you use? [facility]    Preferred method for responding to this message: Telephone Call    Phone number patient can be reached at: Cell number on file:    Telephone Information:   Mobile [phone]       If we cannot reach you directly, may we leave a detailed response at the number you provided? Yes    
[phone]

## 2022-05-08 DIAGNOSIS — N92.6 IRREGULAR MENSES: ICD-10-CM

## 2022-05-09 RX ORDER — NORGESTIMATE AND ETHINYL ESTRADIOL 0.25-0.035
KIT ORAL
Qty: 84 TABLET | Refills: 0 | Status: SHIPPED | OUTPATIENT
Start: 2022-05-09 | End: 2022-07-11

## 2022-05-09 NOTE — TELEPHONE ENCOUNTER
"Requested Prescriptions   Pending Prescriptions Disp Refills     MONO-LINYAH 0.25-35 MG-MCG tablet [Pharmacy Med Name: MONO-LINYAH 28-DAY TABLET] 84 tablet 0     Sig: TAKE 1 TABLET BY MOUTH DAILY       Contraceptives Protocol Passed - 5/9/2022  8:21 AM        Passed - Patient is not a current smoker if age is 35 or older        Passed - Recent (12 mo) or future (30 days) visit within the authorizing provider's specialty     Patient has had an office visit with the authorizing provider or a provider within the authorizing providers department within the previous 12 mos or has a future within next 30 days. See \"Patient Info\" tab in inbasket, or \"Choose Columns\" in Meds & Orders section of the refill encounter.              Passed - Medication is active on med list        Passed - No active pregnancy on record        Passed - No positive pregnancy test in past 12 months             Teresa Mehta RN on 5/9/2022 at 8:58 AM         "

## 2022-05-22 ENCOUNTER — MYC REFILL (OUTPATIENT)
Dept: FAMILY MEDICINE | Facility: OTHER | Age: 32
End: 2022-05-22
Payer: COMMERCIAL

## 2022-05-22 DIAGNOSIS — R22.1 SWELLING, MASS, OR LUMP IN HEAD AND NECK: ICD-10-CM

## 2022-05-22 DIAGNOSIS — R22.0 SWELLING, MASS, OR LUMP IN HEAD AND NECK: ICD-10-CM

## 2022-05-22 DIAGNOSIS — Z91.038 HYMENOPTERA ALLERGY: Primary | ICD-10-CM

## 2022-05-24 RX ORDER — EPINEPHRINE 0.3 MG/.3ML
0.3 INJECTION SUBCUTANEOUS PRN
Qty: 2 EACH | Refills: 11 | Status: SHIPPED | OUTPATIENT
Start: 2022-05-24 | End: 2023-10-31

## 2022-05-24 NOTE — TELEPHONE ENCOUNTER
"Requested Prescriptions   Pending Prescriptions Disp Refills     EPINEPHrine HCl (Anaphylaxis) (EPI E-Z PEN LYNETTE 1:1000  IJ) 1 Device 0     Sig: Inject as directed once for 1 dose       Anaphylaxis Kits Protocol Passed - 5/24/2022 12:07 PM        Passed - Recent (12 mo) or future (30 days) visit witin the authorizing provider's specialty     Patient has had an office visit with the authorizing provider or a provider within the authorizing providers department within the previous 12 mos or has a future within next 30 days. See \"Patient Info\" tab in inbasket, or \"Choose Columns\" in Meds & Orders section of the refill encounter.              Passed - Patient is age 5 or older        Passed - Medication is active on med list               LOV: 3/24/2021  Future Office visit: No future appointment scheduled at this time.      Routing refill request to provider for review/approval.  Per Quality report patient is due for: Preventive care visit, Hep C, pap, Asthma action plan, Asthma control test, PHQ-9.      PHQ-9 and ACT sent via [facility].    Routed to provider for review and consideration. Deloris Wallis RN  ....................  5/24/2022   1:20 PM        "

## 2022-05-25 NOTE — TELEPHONE ENCOUNTER
After patient got dressed and ready to go, patient walked to the bathroom and back. Patient got very dizzy and sweaty. Patient became hypotensive. Was given water and is in bed resting. Will retry orthostatic vitals in a half hour.    TCB to schedule annual visit.    Bernadine Steward on 5/25/2022 at 12:15 PM

## 2022-06-06 DIAGNOSIS — M62.838 MUSCLE SPASM: ICD-10-CM

## 2022-06-06 DIAGNOSIS — J30.0 VASOMOTOR RHINITIS: ICD-10-CM

## 2022-06-07 RX ORDER — CYCLOBENZAPRINE HCL 10 MG
TABLET ORAL
Qty: 30 TABLET | Refills: 2 | Status: SHIPPED | OUTPATIENT
Start: 2022-06-07 | End: 2022-08-09

## 2022-06-07 RX ORDER — FLUTICASONE PROPIONATE 50 MCG
SPRAY, SUSPENSION (ML) NASAL
Qty: 48 G | Refills: 11 | Status: SHIPPED | OUTPATIENT
Start: 2022-06-07 | End: 2022-07-06

## 2022-06-07 NOTE — TELEPHONE ENCOUNTER
[id number] ([place] [place] sent Rx request for the following:      Requested Prescriptions   Pending Prescriptions Disp Refills     fluticasone (FLONASE) 50 MCG/ACT nasal spray [Pharmacy Med Name: FLUTICASONE 50MCG/ACT SPRAY]  11     Sig: INSTILL 2 SPRAYS INTO BOTH NOSTRILS DAILY   Last Prescription Date:   2/5/20  Last Fill Qty/Refills:         18 ml, R-11         cyclobenzaprine (FLEXERIL) 10 MG tablet [Pharmacy Med Name: CYCLOBENZAPRINE 10MG TABLET] 30 tablet 3     Sig: TAKE 1 TABLET BY MOUTH 3 TIMES DAILY IF NEEDED FOR MUSCLE SPASM.   Last Prescription Date:   7/6/21  Last Fill Qty/Refills:         30, R-3      Last Office Visit:              3/24/21   Future Office visit:             Next 5 appointments (look out 90 days)    Jul 06, 2022  1:00 PM  PHYSICAL with Will WILLIS MD  [facility] ([facility] ) [address]  [place] [address]  [phone]        Lidia Fernandez RN .............. 6/7/2022  3:30 PM

## 2022-07-06 ENCOUNTER — OFFICE VISIT (OUTPATIENT)
Dept: FAMILY MEDICINE | Facility: OTHER | Age: 32
End: 2022-07-06
Attending: FAMILY MEDICINE
Payer: COMMERCIAL

## 2022-07-06 VITALS
OXYGEN SATURATION: 98 % | TEMPERATURE: 97.5 F | SYSTOLIC BLOOD PRESSURE: 110 MMHG | HEART RATE: 60 BPM | RESPIRATION RATE: 16 BRPM | HEIGHT: 63 IN | DIASTOLIC BLOOD PRESSURE: 80 MMHG | WEIGHT: 221.2 LBS | BODY MASS INDEX: 39.19 KG/M2

## 2022-07-06 DIAGNOSIS — Z13.228 SCREENING FOR METABOLIC DISORDER: ICD-10-CM

## 2022-07-06 DIAGNOSIS — Z13.220 SCREENING FOR HYPERLIPIDEMIA: ICD-10-CM

## 2022-07-06 DIAGNOSIS — Z13.29 SCREENING FOR HYPOTHYROIDISM: ICD-10-CM

## 2022-07-06 DIAGNOSIS — Z13.0 SCREENING FOR DEFICIENCY ANEMIA: ICD-10-CM

## 2022-07-06 DIAGNOSIS — Z13.1 SCREENING FOR DIABETES MELLITUS: ICD-10-CM

## 2022-07-06 DIAGNOSIS — Z00.00 VISIT FOR PREVENTIVE HEALTH EXAMINATION: Primary | ICD-10-CM

## 2022-07-06 DIAGNOSIS — K21.9 GASTROESOPHAGEAL REFLUX DISEASE WITHOUT ESOPHAGITIS: ICD-10-CM

## 2022-07-06 LAB
ALBUMIN SERPL-MCNC: 4 G/DL (ref 3.5–5.7)
ALP SERPL-CCNC: 30 U/L (ref 34–104)
ALT SERPL W P-5'-P-CCNC: 12 U/L (ref 7–52)
ANION GAP SERPL CALCULATED.3IONS-SCNC: 8 MMOL/L (ref 3–14)
AST SERPL W P-5'-P-CCNC: 16 U/L (ref 13–39)
BASOPHILS # BLD AUTO: 0.1 10E3/UL (ref 0–0.2)
BASOPHILS NFR BLD AUTO: 1 %
BILIRUB SERPL-MCNC: 0.3 MG/DL (ref 0.3–1)
BUN SERPL-MCNC: 6 MG/DL (ref 7–25)
CALCIUM SERPL-MCNC: 9.2 MG/DL (ref 8.6–10.3)
CHLORIDE BLD-SCNC: 109 MMOL/L (ref 98–107)
CHOLEST SERPL-MCNC: 195 MG/DL
CO2 SERPL-SCNC: 21 MMOL/L (ref 21–31)
CREAT SERPL-MCNC: 0.87 MG/DL (ref 0.6–1.2)
EOSINOPHIL # BLD AUTO: 0.1 10E3/UL (ref 0–0.7)
EOSINOPHIL NFR BLD AUTO: 1 %
ERYTHROCYTE [DISTWIDTH] IN BLOOD BY AUTOMATED COUNT: 11.7 % (ref 10–15)
FASTING STATUS PATIENT QL REPORTED: ABNORMAL
GFR SERPL CREATININE-BSD FRML MDRD: >90 ML/MIN/1.73M2
GLUCOSE BLD-MCNC: 107 MG/DL (ref 70–105)
HBA1C MFR BLD: 5 % (ref 4–6.2)
HCT VFR BLD AUTO: 39.5 % (ref 35–47)
HDLC SERPL-MCNC: 36 MG/DL (ref 23–92)
HGB BLD-MCNC: 13.8 G/DL (ref 11.7–15.7)
IMM GRANULOCYTES # BLD: 0 10E3/UL
IMM GRANULOCYTES NFR BLD: 0 %
LDLC SERPL CALC-MCNC: 119 MG/DL
LYMPHOCYTES # BLD AUTO: 2.3 10E3/UL (ref 0.8–5.3)
LYMPHOCYTES NFR BLD AUTO: 32 %
MCH RBC QN AUTO: 31 PG (ref 26.5–33)
MCHC RBC AUTO-ENTMCNC: 34.9 G/DL (ref 31.5–36.5)
MCV RBC AUTO: 89 FL (ref 78–100)
MONOCYTES # BLD AUTO: 0.7 10E3/UL (ref 0–1.3)
MONOCYTES NFR BLD AUTO: 9 %
NEUTROPHILS # BLD AUTO: 4.2 10E3/UL (ref 1.6–8.3)
NEUTROPHILS NFR BLD AUTO: 57 %
NONHDLC SERPL-MCNC: 159 MG/DL
NRBC # BLD AUTO: 0 10E3/UL
NRBC BLD AUTO-RTO: 0 /100
PLATELET # BLD AUTO: 271 10E3/UL (ref 150–450)
POTASSIUM BLD-SCNC: 4.3 MMOL/L (ref 3.5–5.1)
PROT SERPL-MCNC: 6.9 G/DL (ref 6.4–8.9)
RBC # BLD AUTO: 4.45 10E6/UL (ref 3.8–5.2)
SODIUM SERPL-SCNC: 138 MMOL/L (ref 134–144)
TRIGL SERPL-MCNC: 202 MG/DL
TSH SERPL DL<=0.005 MIU/L-ACNC: 0.69 MU/L (ref 0.4–4)
WBC # BLD AUTO: 7.4 10E3/UL (ref 4–11)

## 2022-07-06 PROCEDURE — 80053 COMPREHEN METABOLIC PANEL: CPT | Mod: ZL | Performed by: FAMILY MEDICINE

## 2022-07-06 PROCEDURE — 85004 AUTOMATED DIFF WBC COUNT: CPT | Mod: ZL | Performed by: FAMILY MEDICINE

## 2022-07-06 PROCEDURE — G0463 HOSPITAL OUTPT CLINIC VISIT: HCPCS

## 2022-07-06 PROCEDURE — 80061 LIPID PANEL: CPT | Mod: ZL | Performed by: FAMILY MEDICINE

## 2022-07-06 PROCEDURE — 83036 HEMOGLOBIN GLYCOSYLATED A1C: CPT | Mod: ZL | Performed by: FAMILY MEDICINE

## 2022-07-06 PROCEDURE — 99395 PREV VISIT EST AGE 18-39: CPT | Performed by: FAMILY MEDICINE

## 2022-07-06 PROCEDURE — 36415 COLL VENOUS BLD VENIPUNCTURE: CPT | Mod: ZL | Performed by: FAMILY MEDICINE

## 2022-07-06 PROCEDURE — 84443 ASSAY THYROID STIM HORMONE: CPT | Mod: ZL | Performed by: FAMILY MEDICINE

## 2022-07-06 ASSESSMENT — ENCOUNTER SYMPTOMS
HEMATURIA: 0
BREAST MASS: 0
DIARRHEA: 0
HEARTBURN: 1
DYSURIA: 0
PARESTHESIAS: 0
SHORTNESS OF BREATH: 0
DIZZINESS: 0
MYALGIAS: 1
HEMATOCHEZIA: 0
NERVOUS/ANXIOUS: 0
HEADACHES: 1
COUGH: 0
FREQUENCY: 0
PALPITATIONS: 0
WEAKNESS: 0
CHILLS: 0
FEVER: 0
ABDOMINAL PAIN: 0
EYE PAIN: 0
SORE THROAT: 0
JOINT SWELLING: 0
NAUSEA: 0
CONSTIPATION: 0
ARTHRALGIAS: 1

## 2022-07-06 ASSESSMENT — PATIENT HEALTH QUESTIONNAIRE - PHQ9
SUM OF ALL RESPONSES TO PHQ QUESTIONS 1-9: 1
SUM OF ALL RESPONSES TO PHQ QUESTIONS 1-9: 1
10. IF YOU CHECKED OFF ANY PROBLEMS, HOW DIFFICULT HAVE THESE PROBLEMS MADE IT FOR YOU TO DO YOUR WORK, TAKE CARE OF THINGS AT HOME, OR GET ALONG WITH OTHER PEOPLE: NOT DIFFICULT AT ALL

## 2022-07-06 ASSESSMENT — PAIN SCALES - GENERAL: PAINLEVEL: NO PAIN (0)

## 2022-07-06 NOTE — NURSING NOTE
Chief Complaint   Patient presents with     Physical     Patient presents to the clinic today for a physical.  Medication Reconciliation: completed   Marian Cisneros LPN  7/6/2022 12:56 PM

## 2022-07-06 NOTE — PROGRESS NOTES
Nursing Notes:   Macielpearl Marian, LPN  2022 12:57 PM  Signed  Chief Complaint   Patient presents with     Physical     Patient presents to the clinic today for a physical.  Medication Reconciliation: completed   Marian CisnerosSHANICE  2022 12:56 PM       SUBJECTIVE:  Ita Poole  is a 32 year old female who comes in for a physical.  She is due for Pap smear. She plans to see Dr. Flower for that.  She continues on OCPs and menses are regular.    She continues to see Michelle Case for her mental health medications. She is on Wellbutrin and Risperdal and Ambien at .  She rarely takes Ativan.      Immunizations are up-to-date.    She had an episode of pain and purple coloration on the thenar eminence bilaterally.     She has cut out some carbs and has lost 20#.      She is working as a  for [employer] at the [employer]. Her asthma has been pretty well controlled and she is using her inhaler occasionally.       Past Medical, Family, and Social History reviewed and updated as noted below.   ROS is negative except as noted above       Allergies   Allergen Reactions     Amoxicillin-Pot Clavulanate Diarrhea     Augmentin Diarrhea     Yeast Infection in colon     Azithromycin Nausea     Azithromycin      Can't take because it contains wheat     Codeine      constipation     Gluten      Gluten Meal      Other reaction(s): Constipation  Celiac disease, abdominal pain     Food Diarrhea     Other reaction(s): Constipation  Wheat, rye, barley   ,   Family History   Problem Relation Age of Onset     Asthma Mother         Asthma     Allergy (Severe) Mother         Allergies     Asthma Father         Asthma     Heart Disease Father 43        Heart Disease, of MI     Diabetes Father         Diabetes     Asthma Maternal Grandmother         Asthma     Diabetes Paternal Grandfather         Diabetes   ,   Current Outpatient Medications   Medication     buPROPion (WELLBUTRIN SR) 100 MG 12 hr tablet      cyclobenzaprine (FLEXERIL) 10 MG tablet     EPI E-Z PEN LYNETTE 1:1000  IJ     EPINEPHrine (ANY BX GENERIC EQUIV) 0.3 MG/0.3ML injection 2-pack     ibuprofen (ADVIL/MOTRIN) 800 MG tablet     LORazepam (ATIVAN) 0.5 MG tablet     LORazepam (ATIVAN) 1 MG tablet     medical cannabis (Patient's own supply)     MONO-LINYAH 0.25-35 MG-MCG tablet     omeprazole (PRILOSEC) 20 MG DR capsule     ondansetron (ZOFRAN-ODT) 4 MG ODT tab     risperiDONE (RISPERDAL) 0.5 MG tablet     VITAMIN D3 50 MCG (2000 UT) tablet     zolpidem (AMBIEN) 10 MG tablet     albuterol (PROAIR HFA/PROVENTIL HFA/VENTOLIN HFA) 108 (90 BASE) MCG/ACT Inhaler     No current facility-administered medications for this visit.   ,   Past Medical History:   Diagnosis Date     Allergic rhinitis     No Comments Provided     Celiac disease     No Comments Provided     Mass of right index finger at the PIP joint 2018     S/P excision of mass right index finger 2018     Uncomplicated asthma     No Comments Provided   ,   Patient Active Problem List    Diagnosis Date Noted     S/P excision of mass right index finger 2018     Priority: Medium     Cold thyroid nodule 2017     Priority: Medium     Morbid obesity (H) 2017     Priority: Medium     Irregular menses 2016     Priority: Medium     H/O  section 2014     Priority: Medium     Major depressive disorder, recurrent episode, moderate (H) 2013     Priority: Medium     Overview:   Dx added per office visit on 12 with Dr. Minnie Baltazar RN, Clinical , [facility], 2013 5:02 PM    Formatting of this note might be different from the original.  Dx added per office visit on 12 with Dr. Minnie Baltazar RN, Clinical , [facility], 2013 5:02 PM       Vitamin D deficiency 2007     Priority: Medium     Problem list name updated by  "automated process. Provider to review       Angioedema 2006     Priority: Medium     Problem list name updated by automated process. Provider to review       Celiac disease 2006     Priority: Medium     Abdominal pain, generalized 2006     Priority: Medium     C.diff, admitted for obs. X 24 hrs. Flagyl started as outpt. Laura Moffett MD         Depressive disorder, not elsewhere classified 10/28/2005     Priority: Medium     Followed at [facility], meds by Marnie Marsh RN       Moderate persistent asthma 2005     Priority: Medium     aap completed         Allergic rhinitis 2005     Priority: Medium     Problem list name updated by automated process. Provider to review       Migraine 2005     Priority: Medium     Problem list name updated by automated process. Provider to review     ,   Past Surgical History:   Procedure Laterality Date      SECTION      ,4/10/14, Section     COLONOSCOPY      age 10     ESOPHAGOSCOPY, GASTROSCOPY, DUODENOSCOPY (EGD), COMBINED      [id number],ESOPHAGOGASTRODUODENOSCOPY,age 10, small bowel bx: celiac disease     EXCISE MASS UPPER EXTREMITY Right 2018    Procedure: EXCISE MASS UPPER EXTREMITY;  Right Index Finger Mass Excision;  Surgeon: Nate Allen DO;  Location: [facility] OR     LAPAROSCOPIC CHOLECYSTECTOMY      9/22/15,Cholecystectomy,Laparoscopic     S/P EXCISION OF MASS RIGHT INDEX FINGER Right 2018    and   Social History     Tobacco Use     Smoking status: Never Smoker     Smokeless tobacco: Never Used     Tobacco comment: Quit smoking: passive exposure   Substance Use Topics     Alcohol use: Yes     Alcohol/week: 0.0 standard drinks     Comment: social      OBJECTIVE:  /80 (BP Location: Right arm, Patient Position: Sitting, Cuff Size: Adult Large)   Pulse 60   Temp 97.5  F (36.4  C) (Tympanic)   Resp 16   Ht 1.6 m (5' 3\")   Wt 100.3 kg (221 lb 3.2 oz)   SpO2 98%   BMI 39.18 kg/m     EXAM:  General " Appearance: Pleasant, alert, appropriate appearance for age. No acute distress  Head Exam: Normal. Normocephalic, atraumatic.  Eye Exam: PERRLA, EOMI, conjunctivae, sclerae normal.  Ear Exam: Normal TM's bilaterally. Normal auditory canals and external ears. Non-tender.  Nose Exam: Normal external nose, mucus membranes, and septum.  OroPharynx Exam:  Dental hygiene adequate. Normal buccal mucosa. Normal pharynx.  Neck Exam:  Supple, no masses or nodes. No bruits  Thyroid Exam: No nodules or enlargement.  Chest/Respiratory Exam: Normal chest wall and respirations. Clear to auscultation.  Breast Exam: No dimpling, nipple retraction or discharge. No masses or nodes.  Cardiovascular Exam: Regular rate and rhythm. S1, S2, no murmur, click, gallop, or rubs.  Gastrointestinal Exam: Soft, non-tender, no masses or organomegaly.  Genitourinary exam: Deferred as she is going to see Dr. Flower  Lymphatic Exam: Non-palpable nodes in neck,clavicular, axillary, or inguinal regions.  Musculoskeletal Exam: Back is straight and non-tender, full ROM of upper and lower extremities.  Foot Exam: Left and right foot: good pedal pulses.   Skin: no rash or abnormalities  Neurologic Exam:  normal gross motor, tone coordination and no tremor.  Psychiatric Exam: Alert and oriented - appropriate affect.     Results for orders placed or performed in visit on 07/06/22   Comprehensive metabolic panel     Status: Abnormal   Result Value Ref Range    Sodium 138 134 - 144 mmol/L    Potassium 4.3 3.5 - 5.1 mmol/L    Chloride 109 (H) 98 - 107 mmol/L    Carbon Dioxide (CO2) 21 21 - 31 mmol/L    Anion Gap 8 3 - 14 mmol/L    Urea Nitrogen 6 (L) 7 - 25 mg/dL    Creatinine 0.87 0.60 - 1.20 mg/dL    Calcium 9.2 8.6 - 10.3 mg/dL    Glucose 107 (H) 70 - 105 mg/dL    Alkaline Phosphatase 30 (L) 34 - 104 U/L    AST 16 13 - 39 U/L    ALT 12 7 - 52 U/L    Protein Total 6.9 6.4 - 8.9 g/dL    Albumin 4.0 3.5 - 5.7 g/dL    Bilirubin Total 0.3 0.3 - 1.0 mg/dL    GFR  Estimate >90 >60 mL/min/1.73m2   Lipid Profile     Status: Abnormal   Result Value Ref Range    Cholesterol 195 <200 mg/dL    Triglycerides 202 (H) <150 mg/dL    Direct Measure HDL 36 23 - 92 mg/dL    LDL Cholesterol Calculated 119 (H) <=100 mg/dL    Non HDL Cholesterol 159 (H) <130 mg/dL    Patient Fasting > 8hrs? Unknown     Narrative    Cholesterol  Desirable:  <200 mg/dL    Triglycerides  Normal:  Less than 150 mg/dL  Borderline High:  150-199 mg/dL  High:  200-499 mg/dL  Very High:  Greater than or equal to 500 mg/dL    Direct Measure HDL  Female:  Greater than or equal to 50 mg/dL   Male:  Greater than or equal to 40 mg/dL    LDL Cholesterol  Desirable:  <100mg/dL  Above Desirable:  100-129 mg/dL   Borderline High:  130-159 mg/dL   High:  160-189 mg/dL   Very High:  >= 190 mg/dL    Non HDL Cholesterol  Desirable:  130 mg/dL  Above Desirable:  130-159 mg/dL  Borderline High:  160-189 mg/dL  High:  190-219 mg/dL  Very High:  Greater than or equal to 220 mg/dL   Hemoglobin A1c     Status: Normal   Result Value Ref Range    Hemoglobin A1C 5.0 4.0 - 6.2 %   TSH with free T4 reflex     Status: Normal   Result Value Ref Range    TSH 0.69 0.40 - 4.00 mU/L   CBC with platelets and differential     Status: None   Result Value Ref Range    WBC Count 7.4 4.0 - 11.0 10e3/uL    RBC Count 4.45 3.80 - 5.20 10e6/uL    Hemoglobin 13.8 11.7 - 15.7 g/dL    Hematocrit 39.5 35.0 - 47.0 %    MCV 89 78 - 100 fL    MCH 31.0 26.5 - 33.0 pg    MCHC 34.9 31.5 - 36.5 g/dL    RDW 11.7 10.0 - 15.0 %    Platelet Count 271 150 - 450 10e3/uL    % Neutrophils 57 %    % Lymphocytes 32 %    % Monocytes 9 %    % Eosinophils 1 %    % Basophils 1 %    % Immature Granulocytes 0 %    NRBCs per 100 WBC 0 <1 /100    Absolute Neutrophils 4.2 1.6 - 8.3 10e3/uL    Absolute Lymphocytes 2.3 0.8 - 5.3 10e3/uL    Absolute Monocytes 0.7 0.0 - 1.3 10e3/uL    Absolute Eosinophils 0.1 0.0 - 0.7 10e3/uL    Absolute Basophils 0.1 0.0 - 0.2 10e3/uL    Absolute  Immature Granulocytes 0.0 <=0.4 10e3/uL    Absolute NRBCs 0.0 10e3/uL   CBC with Platelets & Differential     Status: None    Narrative    The following orders were created for panel order CBC with Platelets & Differential.  Procedure                               Abnormality         Status                     ---------                               -----------         ------                     CBC with platelets and d...[[id number]]                      Final result                 Please view results for these tests on the individual orders.      ASSESSMENT/Plan :    Ita was seen today for physical.    Diagnoses and all orders for this visit:    Visit for preventive health examination    Gastroesophageal reflux disease without esophagitis  -     omeprazole (PRILOSEC) 20 MG DR capsule; TAKE 1 CAPSULE (20 MG) BY MOUTH DAILY    Screening for deficiency anemia  -     CBC with Platelets & Differential; Future  -     CBC with Platelets & Differential    Screening for metabolic disorder  -     Comprehensive metabolic panel; Future  -     Comprehensive metabolic panel    Screening for hyperlipidemia  -     Lipid Profile; Future  -     Lipid Profile    Screening for diabetes mellitus  -     Hemoglobin A1c; Future  -     Hemoglobin A1c    Screening for hypothyroidism  -     TSH with free T4 reflex; Future  -     TSH with free T4 reflex    Other orders  -     Pap Screen with HPV - recommended age 30 - 65 years      Will notify of lab results when available. Discussed diet, exercise and healthy lifestyle changes. Continue current medications.  Follow-up with Dr. Flower for her Pap smear.    Will Reynoso MD            Answers for HPI/ROS submitted by the patient on 7/6/2022  If you checked off any problems, how difficult have these problems made it for you to do your work, take care of things at home, or get along with other people?: Not difficult at all  PHQ9 TOTAL SCORE: 1  Frequency of exercise:: 2-3 days/week  Getting  at least 3 servings of Calcium per day:: NO  Diet:: Gluten-free/reduced  Taking medications regularly:: Yes  Medication side effects:: None  Bi-annual eye exam:: Yes  Dental care twice a year:: NO  Sleep apnea or symptoms of sleep apnea:: Sleep apnea  abdominal pain: No  Blood in stool: No  Blood in urine: No  chest pain: No  chills: No  congestion: No  constipation: No  cough: No  diarrhea: No  dizziness: No  ear pain: No  eye pain: No  nervous/anxious: No  fever: No  frequency: No  genital sores: No  headaches: Yes  hearing loss: No  heartburn: Yes  arthralgias: Yes  joint swelling: No  peripheral edema: No  mood changes: No  myalgias: Yes  nausea: No  dysuria: No  palpitations: No  Skin sensation changes: No  sore throat: No  urgency: No  rash: No  shortness of breath: No  visual disturbance: No  weakness: No  pelvic pain: No  vaginal bleeding: No  vaginal discharge: No  tenderness: No  breast mass: No  breast discharge: No  Additional concerns today:: No  Duration of exercise:: Greater than 60 minutes

## 2022-07-09 DIAGNOSIS — N92.6 IRREGULAR MENSES: ICD-10-CM

## 2022-07-10 DIAGNOSIS — S30.0XXA CONTUSION, BUTTOCK, INITIAL ENCOUNTER: ICD-10-CM

## 2022-07-10 DIAGNOSIS — M79.18 RIGHT BUTTOCK PAIN: ICD-10-CM

## 2022-07-11 RX ORDER — NORGESTIMATE AND ETHINYL ESTRADIOL 0.25-0.035
KIT ORAL
Qty: 84 TABLET | Refills: 0 | Status: SHIPPED | OUTPATIENT
Start: 2022-07-11 | End: 2022-08-22

## 2022-07-11 RX ORDER — IBUPROFEN 800 MG/1
TABLET, FILM COATED ORAL
Qty: 180 TABLET | Refills: 4 | Status: SHIPPED | OUTPATIENT
Start: 2022-07-11 | End: 2023-10-13

## 2022-07-11 NOTE — TELEPHONE ENCOUNTER
"Requested Prescriptions   Pending Prescriptions Disp Refills     ibuprofen (ADVIL/MOTRIN) 800 MG tablet [Pharmacy Med Name: IBUPROFEN 800MG TABLET] 90 tablet 7     Sig: TAKE 1 TABLET BY MOUTH 3 TIMES DAILY IF NEEDED FOR PAIN.       NSAID Medications Passed - 7/10/2022  5:01 AM        Passed - Blood pressure under 140/90 in past 12 months     BP Readings from Last 3 Encounters:   07/06/22 110/80   02/28/22 126/64   12/20/21 (!) 146/102                 Passed - Normal ALT on file in past 12 months     Recent Labs   Lab Test 07/06/22  1359 05/08/19  1054 10/18/16  1832   ALT 12   < >  --    GICHALT  --   --  12    < > = values in this interval not displayed.             Passed - Normal AST on file in past 12 months     Recent Labs   Lab Test 07/06/22  1359 05/08/19  1054 10/18/16  1832   AST 16   < >  --    GICHAST  --   --  14    < > = values in this interval not displayed.             Passed - Recent (12 mo) or future (30 days) visit within the authorizing provider's specialty     Patient has had an office visit with the authorizing provider or a provider within the authorizing providers department within the previous 12 mos or has a future within next 30 days. See \"Patient Info\" tab in inbasket, or \"Choose Columns\" in Meds & Orders section of the refill encounter.              Passed - Patient is age 6-64 years        Passed - Normal CBC on file in past 12 months     Recent Labs   Lab Test 07/06/22  1359 02/27/18  1630 10/18/16  1815   WBC 7.4   < >  --    GICHWBC  --   --  10.0   RBC 4.45   < >  --    GICHRBC  --   --  4.37   HGB 13.8   < > 13.3   HCT 39.5   < > 38.6      < > 305    < > = values in this interval not displayed.                 Passed - Medication is active on med list        Passed - No active pregnancy on record        Passed - Normal serum creatinine on file in past 12 months     Recent Labs   Lab Test 07/06/22  1359   CR 0.87       Ok to refill medication if creatinine is low          " Passed - No positive pregnancy test in past 12 months     Last Written Prescription Date:  3/22/21  Last Fill Quantity: 90,   # refills: 7  Last Office Visit: 7/6/22 Edgardo  Future Office visit: none      Routing refill request to provider for review/approval   Brenda J. Goodell, RN on 7/11/2022 at 2:40 PM

## 2022-08-08 DIAGNOSIS — M62.838 MUSCLE SPASM: ICD-10-CM

## 2022-08-09 RX ORDER — CYCLOBENZAPRINE HCL 10 MG
TABLET ORAL
Qty: 30 TABLET | Refills: 2 | Status: SHIPPED | OUTPATIENT
Start: 2022-08-09 | End: 2023-02-15

## 2022-08-09 NOTE — TELEPHONE ENCOUNTER
[id number] sent Rx request for the following:      CYCLOBENZAPRINE 10MG TABLET      Last Prescription Date:   6/7/2022  Last Fill Qty/Refills:         30, R-2    Last Office Visit:              7/6/2022   Future Office visit:           8/22/2022    Antonio Buchanan RN, BSN  ....................  8/9/2022   2:50 PM

## 2022-08-22 ENCOUNTER — OFFICE VISIT (OUTPATIENT)
Dept: FAMILY MEDICINE | Facility: OTHER | Age: 32
End: 2022-08-22
Attending: FAMILY MEDICINE
Payer: COMMERCIAL

## 2022-08-22 VITALS
HEART RATE: 112 BPM | SYSTOLIC BLOOD PRESSURE: 124 MMHG | TEMPERATURE: 96.9 F | RESPIRATION RATE: 16 BRPM | WEIGHT: 220 LBS | DIASTOLIC BLOOD PRESSURE: 84 MMHG | BODY MASS INDEX: 38.97 KG/M2 | OXYGEN SATURATION: 97 %

## 2022-08-22 DIAGNOSIS — N92.6 IRREGULAR MENSES: ICD-10-CM

## 2022-08-22 DIAGNOSIS — Z12.4 SCREENING FOR MALIGNANT NEOPLASM OF CERVIX: Primary | ICD-10-CM

## 2022-08-22 PROCEDURE — G0463 HOSPITAL OUTPT CLINIC VISIT: HCPCS

## 2022-08-22 PROCEDURE — 87624 HPV HI-RISK TYP POOLED RSLT: CPT | Mod: ZL | Performed by: FAMILY MEDICINE

## 2022-08-22 PROCEDURE — 99213 OFFICE O/P EST LOW 20 MIN: CPT | Performed by: FAMILY MEDICINE

## 2022-08-22 PROCEDURE — G0123 SCREEN CERV/VAG THIN LAYER: HCPCS | Performed by: FAMILY MEDICINE

## 2022-08-22 RX ORDER — RISPERIDONE 2 MG/1
TABLET ORAL
COMMUNITY
Start: 2022-06-07

## 2022-08-22 RX ORDER — BUPROPION HYDROCHLORIDE 150 MG/1
TABLET ORAL
COMMUNITY
Start: 2022-08-08 | End: 2023-06-29 | Stop reason: DRUGHIGH

## 2022-08-22 RX ORDER — NORGESTIMATE AND ETHINYL ESTRADIOL 0.25-0.035
KIT ORAL
Qty: 84 TABLET | Refills: 11 | Status: SHIPPED | OUTPATIENT
Start: 2022-08-22 | End: 2023-09-13

## 2022-08-22 ASSESSMENT — ASTHMA QUESTIONNAIRES
QUESTION_2 LAST FOUR WEEKS HOW OFTEN HAVE YOU HAD SHORTNESS OF BREATH: NOT AT ALL
ACT_TOTALSCORE: 25
ACT_TOTALSCORE: 25
QUESTION_3 LAST FOUR WEEKS HOW OFTEN DID YOUR ASTHMA SYMPTOMS (WHEEZING, COUGHING, SHORTNESS OF BREATH, CHEST TIGHTNESS OR PAIN) WAKE YOU UP AT NIGHT OR EARLIER THAN USUAL IN THE MORNING: NOT AT ALL
QUESTION_5 LAST FOUR WEEKS HOW WOULD YOU RATE YOUR ASTHMA CONTROL: COMPLETELY CONTROLLED
QUESTION_1 LAST FOUR WEEKS HOW MUCH OF THE TIME DID YOUR ASTHMA KEEP YOU FROM GETTING AS MUCH DONE AT WORK, SCHOOL OR AT HOME: NONE OF THE TIME
QUESTION_4 LAST FOUR WEEKS HOW OFTEN HAVE YOU USED YOUR RESCUE INHALER OR NEBULIZER MEDICATION (SUCH AS ALBUTEROL): NOT AT ALL

## 2022-08-22 ASSESSMENT — PATIENT HEALTH QUESTIONNAIRE - PHQ9
SUM OF ALL RESPONSES TO PHQ QUESTIONS 1-9: 0
10. IF YOU CHECKED OFF ANY PROBLEMS, HOW DIFFICULT HAVE THESE PROBLEMS MADE IT FOR YOU TO DO YOUR WORK, TAKE CARE OF THINGS AT HOME, OR GET ALONG WITH OTHER PEOPLE: NOT DIFFICULT AT ALL
SUM OF ALL RESPONSES TO PHQ QUESTIONS 1-9: 0

## 2022-08-22 ASSESSMENT — PAIN SCALES - GENERAL: PAINLEVEL: NO PAIN (0)

## 2022-08-22 NOTE — LETTER
My Asthma Action Plan    Name: Ita Poole   YOB: 1990  Date: 8/22/2022   My doctor: Will Reynoso MD   My clinic: [facility]        My Control Medicine: None  My Rescue Medicine: Albuterol (Proair/Ventolin/Proventil HFA) 2-4 puffs EVERY 4 HOURS as needed. Use a spacer if recommended by your provider.   My Asthma Severity:   Moderate Persistent  Know your asthma triggers: upper respiratory infections, exercise or sports and cold air               GREEN ZONE   Good Control    I feel good    No cough or wheeze    Can work, sleep and play without asthma symptoms       Take your asthma control medicine every day.     1. If exercise triggers your asthma, take your rescue medication    15 minutes before exercise or sports, and    During exercise if you have asthma symptoms  2. Spacer to use with inhaler: If you have a spacer, make sure to use it with your inhaler             YELLOW ZONE Getting Worse  I have ANY of these:    I do not feel good    Cough or wheeze    Chest feels tight    Wake up at night   1. Keep taking your Green Zone medications  2. Start taking your rescue medicine:    every 20 minutes for up to 1 hour. Then every 4 hours for 24-48 hours.  3. If you stay in the Yellow Zone for more than 12-24 hours, contact your doctor.  4. If you do not return to the Green Zone in 12-24 hours or you get worse, start taking your oral steroid medicine if prescribed by your provider.           RED ZONE Medical Alert - Get Help  I have ANY of these:    I feel awful    Medicine is not helping    Breathing getting harder    Trouble walking or talking    Nose opens wide to breathe       1. Take your rescue medicine NOW  2. If your provider has prescribed an oral steroid medicine, start taking it NOW  3. Call your doctor NOW  4. If you are still in the Red Zone after 20 minutes and you have not reached your doctor:    Take your rescue medicine again and    Call 911 or go to the  emergency room right away    See your regular doctor within 2 weeks of an Emergency Room or Urgent Care visit for follow-up treatment.          Annual Reminders:  Meet with Asthma Educator,  Flu Shot in the Fall, consider Pneumonia Vaccination for patients with asthma (aged 19 and older).    Pharmacy:    [id number] ([facility] - [place], [place] - [address]  [facility] - [place], [place] - [address]  [facility] - [place], [place] - [address]    Electronically signed by Will Reynoso MD   Date: 08/22/22                      Asthma Triggers  How To Control Things That Make Your Asthma Worse    Triggers are things that make your asthma worse.  Look at the list below to help you find your triggers and what you can do about them.  You can help prevent asthma flare-ups by staying away from your triggers.      Trigger                                                          What you can do   Cigarette Smoke  Tobacco smoke can make asthma worse. Do not allow smoking in your home, car or around you.  Be sure no one smokes at a child s day care or school.  If you smoke, ask your health care provider for ways to help you quit.  Ask family members to quit too.  Ask your health care provider for a referral to Quit Plan to help you quit smoking, or call 6-763-637-PLAN.     Colds, Flu, Bronchitis  These are common triggers of asthma. Wash your hands often.  Don t touch your eyes, nose or mouth.  Get a flu shot every year.     Dust Mites  These are tiny bugs that live in cloth or carpet. They are too small to see. Wash sheets and blankets in hot water every week.   Encase pillows and mattress in dust mite proof covers.  Avoid having carpet if you can. If you have carpet, vacuum weekly.   Use a dust mask and HEPA vacuum.   Pollen and Outdoor Mold  Some people are allergic to trees, grass, or weed pollen, or molds. Try to keep your windows closed.  Limit time out  doors when pollen count is high.   Ask you health care provider about taking medicine during allergy season.     Animal Dander  Some people are allergic to skin flakes, urine or saliva from pets with fur or feathers. Keep pets with fur or feathers out of your home.    If you can t keep the pet outdoors, then keep the pet out of your bedroom.  Keep the bedroom door closed.  Keep pets off cloth furniture and away from stuffed toys.     Mice, Rats, and Cockroaches   Some people are allergic to the waste from these pests.   Cover food and garbage.  Clean up spills and food crumbs.  Store grease in the refrigerator.   Keep food out of the bedroom.   Indoor Mold  This can be a trigger if your home has high moisture. Fix leaking faucets, pipes, or other sources of water.   Clean moldy surfaces.  Dehumidify basement if it is damp and smelly.   Smoke, Strong Odors, and Sprays  These can reduce air quality. Stay away from strong odors and sprays, such as perfume, powder, hair spray, paints, smoke incense, paint, cleaning products, candles and new carpet.   Exercise or Sports  Some people with asthma have this trigger. Be active!  Ask your doctor about taking medicine before sports or exercise to prevent symptoms.    Warm up for 5-10 minutes before and after sports or exercise.     Other Triggers of Asthma  Cold air:  Cover your nose and mouth with a scarf.  Sometimes laughing or crying can be a trigger.  Some medicines and food can trigger asthma.

## 2022-08-22 NOTE — PROGRESS NOTES
Ita was seen today for gyn exam.    Diagnoses and all orders for this visit:    Screening for malignant neoplasm of cervix  -     Pap Screen with HPV - recommended age 30 - 65 years    Irregular menses  -     norgestimate-ethinyl estradiol (MONO-LINYAH) 0.25-35 MG-MCG tablet; TAKE 1 TABLET BY MOUTH EVERY DAY *DUE FOR ANNUAL VISIT WITH PCP FOR FUTURE REFILLS*       Will notify of lab results when available.  Renewed medication for birth control.  Follow-up in 5 years if Pap smear normal.    Will Reynoso MD     Subjective   Ita is a 32 year old, presenting for the following health issues:  Gyn Exam (pap)    Ita comes in to complete her Pap smear.  She was going to schedule with Dr. Flower but he is booked out and what she wanted to get it done.  She also needs renewal of her OCPs which have been working well for her.  She is on them primarily for menorrhagia.    History of Present Illness       Reason for visit:  Pap    She eats 2-3 servings of fruits and vegetables daily.She consumes 2 sweetened beverage(s) daily.She exercises with enough effort to increase her heart rate 30 to 60 minutes per day.  She exercises with enough effort to increase her heart rate 5 days per week.   She is taking medications regularly.    Today's PHQ-9         PHQ-9 Total Score: 0    PHQ-9 Q9 Thoughts of better off dead/self-harm past 2 weeks :   Not at all    How difficult have these problems made it for you to do your work, take care of things at home, or get along with other people: Not difficult at all         Review of Systems   ROS is negative except as noted above         Objective    /84   Pulse 112   Temp 96.9  F (36.1  C) (Temporal)   Resp 16   Wt 99.8 kg (220 lb)   LMP 06/27/2022   SpO2 97%   Breastfeeding No   BMI 38.97 kg/m    Body mass index is 38.97 kg/m .  Physical Exam   Alert cooperative, no distress.  Pelvic exam, normal external genitalia.  Speculum exam reveals normal cervix.  Pap smear is  taken.  Bimanual exam reveals no pelvic masses and normal uterus and adnexa.                    .  ..

## 2022-08-22 NOTE — NURSING NOTE
"Chief Complaint   Patient presents with     Gyn Exam     pap       Initial /84   Pulse 112   Temp 96.9  F (36.1  C) (Temporal)   Resp 16   Wt 99.8 kg (220 lb)   LMP 06/27/2022   SpO2 97%   Breastfeeding No   BMI 38.97 kg/m   Estimated body mass index is 38.97 kg/m  as calculated from the following:    Height as of 7/6/22: 1.6 m (5' 3\").    Weight as of this encounter: 99.8 kg (220 lb).  Medication Reconciliation: complete    FOOD SECURITY SCREENING QUESTIONS  Hunger Vital Signs:  Within the past 12 months we worried whether our food would run out before we got money to buy more. Never  Within the past 12 months the food we bought just didn't last and we didn't have money to get more. Never        Advance care directive on file? no  Advance care directive provided to patient? declined     Marilu Jacobs, SHANICE  "

## 2022-08-30 LAB
BKR LAB AP GYN ADEQUACY: NORMAL
BKR LAB AP GYN INTERPRETATION: NORMAL
BKR LAB AP HPV REFLEX: NORMAL
BKR LAB AP LMP: NORMAL
BKR LAB AP PREVIOUS ABNORMAL: NORMAL
PATH REPORT.COMMENTS IMP SPEC: NORMAL
PATH REPORT.COMMENTS IMP SPEC: NORMAL
PATH REPORT.RELEVANT HX SPEC: NORMAL

## 2022-09-06 LAB
HUMAN PAPILLOMA VIRUS 16 DNA: NEGATIVE
HUMAN PAPILLOMA VIRUS 18 DNA: NEGATIVE
HUMAN PAPILLOMA VIRUS FINAL DIAGNOSIS: NORMAL
HUMAN PAPILLOMA VIRUS OTHER HR: NEGATIVE

## 2022-09-06 NOTE — NURSING NOTE
"Patient Information     Patient Name MRN Ita Read [id number] Female 1990      Nursing Note by Salud Torres at 2017  1:00 PM     Author:  Salud Torres Service:  (none) Author Type:  NURS- Student Practical Nurse     Filed:  2017  1:37 PM Encounter Date:  2017 Status:  Signed     :  Salud Torres (NURS- Student Practical Nurse)            Patient presents to the clinic for bruise on her bottom. States she fell down three outside steps yesterday and now her bottom is sore and feels like something is \"sticking out\".   Salud Torres, SHANICE............................ 2017 1:22 PM          "
Floor

## 2022-09-26 ENCOUNTER — OFFICE VISIT (OUTPATIENT)
Dept: FAMILY MEDICINE | Facility: OTHER | Age: 32
End: 2022-09-26
Attending: PHYSICIAN ASSISTANT
Payer: COMMERCIAL

## 2022-09-26 VITALS
TEMPERATURE: 99.9 F | DIASTOLIC BLOOD PRESSURE: 86 MMHG | SYSTOLIC BLOOD PRESSURE: 138 MMHG | HEART RATE: 113 BPM | OXYGEN SATURATION: 98 % | BODY MASS INDEX: 37.45 KG/M2 | WEIGHT: 211.4 LBS | RESPIRATION RATE: 16 BRPM

## 2022-09-26 DIAGNOSIS — J06.9 VIRAL URI: ICD-10-CM

## 2022-09-26 DIAGNOSIS — R11.2 NAUSEA AND VOMITING, INTRACTABILITY OF VOMITING NOT SPECIFIED, UNSPECIFIED VOMITING TYPE: Primary | ICD-10-CM

## 2022-09-26 LAB
ALBUMIN UR-MCNC: 100 MG/DL
APPEARANCE UR: CLEAR
BILIRUB UR QL STRIP: NEGATIVE
COLOR UR AUTO: ABNORMAL
FLUAV RNA SPEC QL NAA+PROBE: NEGATIVE
FLUBV RNA RESP QL NAA+PROBE: NEGATIVE
GLUCOSE UR STRIP-MCNC: NEGATIVE MG/DL
GROUP A STREP BY PCR: NOT DETECTED
HGB UR QL STRIP: ABNORMAL
KETONES UR STRIP-MCNC: 20 MG/DL
LEUKOCYTE ESTERASE UR QL STRIP: NEGATIVE
MUCOUS THREADS #/AREA URNS LPF: PRESENT /LPF
NITRATE UR QL: NEGATIVE
PH UR STRIP: 6 [PH] (ref 5–9)
RBC URINE: <1 /HPF
RSV RNA SPEC NAA+PROBE: NEGATIVE
SARS-COV-2 RNA RESP QL NAA+PROBE: NEGATIVE
SP GR UR STRIP: 1.01 (ref 1–1.03)
UROBILINOGEN UR STRIP-MCNC: NORMAL MG/DL
WBC URINE: 1 /HPF

## 2022-09-26 PROCEDURE — 87637 SARSCOV2&INF A&B&RSV AMP PRB: CPT | Mod: ZL | Performed by: PHYSICIAN ASSISTANT

## 2022-09-26 PROCEDURE — 99214 OFFICE O/P EST MOD 30 MIN: CPT | Mod: CS | Performed by: PHYSICIAN ASSISTANT

## 2022-09-26 PROCEDURE — 87651 STREP A DNA AMP PROBE: CPT | Mod: ZL | Performed by: PHYSICIAN ASSISTANT

## 2022-09-26 PROCEDURE — C9803 HOPD COVID-19 SPEC COLLECT: HCPCS | Performed by: PHYSICIAN ASSISTANT

## 2022-09-26 PROCEDURE — G0463 HOSPITAL OUTPT CLINIC VISIT: HCPCS

## 2022-09-26 PROCEDURE — 81001 URINALYSIS AUTO W/SCOPE: CPT | Mod: ZL | Performed by: PHYSICIAN ASSISTANT

## 2022-09-26 RX ORDER — CEFDINIR 300 MG/1
300 CAPSULE ORAL 2 TIMES DAILY
Status: CANCELLED | OUTPATIENT
Start: 2022-09-26

## 2022-09-26 RX ORDER — ONDANSETRON 4 MG/1
4 TABLET, ORALLY DISINTEGRATING ORAL EVERY 8 HOURS PRN
Qty: 30 TABLET | Refills: 0 | Status: SHIPPED | OUTPATIENT
Start: 2022-09-26 | End: 2023-09-27

## 2022-09-26 RX ORDER — FLUCONAZOLE 150 MG/1
150 TABLET ORAL
Qty: 3 TABLET | Refills: 0 | Status: CANCELLED | OUTPATIENT
Start: 2022-09-26 | End: 2022-10-03

## 2022-09-26 ASSESSMENT — PAIN SCALES - GENERAL: PAINLEVEL: SEVERE PAIN (6)

## 2022-09-26 ASSESSMENT — PATIENT HEALTH QUESTIONNAIRE - PHQ9: SUM OF ALL RESPONSES TO PHQ QUESTIONS 1-9: 5

## 2022-09-26 NOTE — LETTER
[facility]  [address]  [place] [address]  Phone: [phone]  Fax: [phone]    September 26, 2022        Ita Poole  PO   [facility] [address]      To whom it may concern:    RE: Ita Poole    Patient was seen and treated today at our clinic and missed work.    Testing in process.      If COVID testing is negative, symptoms are improving (no need for antipyretics, etc.), that patient can return to normal ADLs.    If you test positive for COVID (regardless of vaccination status): stay home for 5 days. If you have no symptoms or symptoms are resolving after 5 days, you may leave the house per CDC guidelines. Continue to wear a mask around others for 5 days. You should also be fever free for 24 hours without the use of fever reducers (Tylenol/Ibuprofen).     If strep positive, out x 1 day to allow full day on antibiotics. If strep negative (and all other pertinent testing, ie: COVID is negative) OK to return to work    If RSV or Influenza positive, out x 3-5 days from symptom onset.     Please contact me for questions or concerns.      Sincerely,        Flor Queen PA-C

## 2022-09-26 NOTE — PATIENT INSTRUCTIONS
"If a strep test was performed:   We will call you with the results of the strep test, in the meantime, information below on viral colds/upper respiratory tract infections were provided.    If the strep test returns \"POSITIVE\" for strep, you will be prescribed an antibiotic, if this is the case, please take the entire course of antibiotic, even if feeling better prior to this. Continue with symptomatic treatment below as needed. If positive, please change toothbrush on day 2.     If the strep test returns \"NEGATIVE\" you do not need antibiotics and are to continue with conservative treatment as outlined below. Continue to monitor symptoms.       If a COVID swab was performed:     If COVID testing is negative, symptoms are improving (no need for antipyretics/fever reducers, etc.), that patient can return to normal activities of daily living.    If you test positive for COVID (regardless of vaccination status): stay home for 5 days. If you have no symptoms or symptoms are resolving after 5 days, you may leave the house per CDC guidelines. Continue to wear a mask around others for 5 days. You should also be fever free for 24 hours without the use of fever reducers (Tylenol/Ibuprofen).     If exposed to COVID to an individual with COVID (if you have received your booster OR if you have completed your primary series of Pfizer or Moderna in last 6 months OR completed the Kristian & Kristian (J&J) vaccine in last two months): Wear a mask around others for 10 days, test on day 5 if possible.  If you develop symptoms, take a test and stay home.     If you were exposed to an individual with COVID (if it has been greater than 6 months since your Pfizer or Moderna vaccine and you have not yet received a booster, completed the Kristian & Kristian  (J&J) vaccine greater than 2 months ago not received a booster or are not vaccinated): Stay home for 5 days, after this continue to wear a mask for 5 days around others.  If you cannot " quarantine per guidelines you should/must wear a mask for 10 days around others. Test on day 5 if possible. If you develop symptoms, take a test and stay home.     ** Must also be fever free for 24 hours without fever reducers (Tylenol or Ibuprofen).     Please refer to your AVS for follow up and pain/symptoms management recommendations (I.e.: medications, helpful conservative treatment modalities, appropriate follow up if need to a specialist or family practice, etc.). Please return to urgent care if your symptoms change or worsen.     Discharge instructions:  -If you were prescribed a medication(s), please take this as prescribed/directed  -Monitor your symptoms, if changing/worsening, return to UC/ER or PCP for follow up    Symptomatic treatments recommended.  - Antibiotics will not help with your symptoms, unless you were told otherwise today (strep throat, ear infection, etc. ). Education provided on symptoms of secondary bacterial infection such as new fever, chills, rigors, shortness of breath, increased work of breathing, that can occur with viral URI and need for further evaluation, if they occur.   - Ensure you are staying hydrated by drinking plenty of fluids and eating mild foods and advance diet as tolerated  - Honey can be soothing for sore throat (as long as above 12 months of age)  - Warm salt water gurgles can help soothe sore throat  - Humidifier can help with congestion and help keep mucus membranes such as throat and nose from drying out.  - Sleeping slightly propped up can help with congestion and postnasal drainage that can worsen cough at bedtime.  - As long as you have never been told to take Tylenol and/or Ibuprofen you can use them to manage fever and body aches per package instructions  Make sure you eat when you take ibuprofen to avoid stomach upset.  - OTC cough medications per package instructions to help with cough. Check to see if the cough/cold medication already has acetaminophen  (Tylenol) in it. If it does avoid taking additional Tylenol.  - If sudden onset of new fever, worsening symptoms return for further evaluation.  - OTC antihistamine such as Allegra, Zyrtec, Claritin (generic is okay) can help with nasal/sinus congestion and OTC nasal steroid such as Flonase can help decrease sinus inflammation to help with congestion.  - Education provided on symptoms of post-viral bacterial infections including ear infection and pneumonia. This would require re-evaluation for treatment.

## 2022-09-26 NOTE — PROGRESS NOTES
ASSESSMENT/PLAN:    I have reviewed the nursing notes.  I have reviewed the findings, diagnosis, plan and need for follow up with the patient.    1. Viral URI  2. Nausea and vomiting, intractability of vomiting not specified, unspecified vomiting type  - Symptomatic; Yes; 9/23/2022 Influenza A/B & SARS-CoV2 (COVID-19) Virus PCR Multiplex Nose  - Group A Streptococcus PCR Throat Swab  - UA reflex to Microscopic and Culture  - ondansetron (ZOFRAN ODT) 4 MG ODT tab; Take 1 tablet (4 mg) by mouth every 8 hours as needed for nausea  Dispense: 30 tablet; Refill: 0  - Vital signs stable. PE consistent with URI. Testing results were as follows: negative COVID, RSV and Influenza multiplex testing. Negative strep. UA negative - recommend to increase fluids. Symptoms likely due to other viral cause, no indication for antibiotics. OK to return to work as long as symptom improvement and fever free (without fever reducers) for 24 hours. Refill of Zofran for nausea provided.     Recommend: increased fluids, rest, use of humidifier, antitussives (cough medication for adults), alternating tylenol and ibuprofen every 4-6 hours as needed (if able to take these medications), salt water gargles for sore throats or throat lozenges, honey, netti pots/saline rinses for sinus/congestion, as well as other home remedies. If worsening fevers, chills, uncontrollable nausea/vomiting, dehydration,etc., or other worsening signs occur, patient is agreeable to follow up for reevaluation.     Patient is in agreement and understanding of the above treatment plan. All questions and concerns were addressed and answered to patient's satisfaction. AVS reviewed with patient.     Discussed warning signs/symptoms indicative of need to f/u    Follow up if symptoms persist or worsen or concerns    I explained my diagnostic considerations and recommendations to the patient, who voiced understanding and agreement with the treatment plan. All questions were  answered. We discussed potential side effects of any prescribed or recommended therapies, as well as expectations for response to treatments.    Flor Queen PA-C  2022  2:27 PM    HPI:    Ita Poole is a 32 year old female  who presents to [facility] today for concerns of URI, x 3 days    Symptoms:  Yes fevers or chills. Fever, highest reported temperature: low grade  No sore throat/pharyngitis/tonsillitis.   No allergy/URI Symptoms  No Muffled Sounds/Change in Hearing  No Sensation of Fullness in Ear(s)  No Ringing in Ears/Tinnitus  No Balance Changes  No Dizziness  No Congestion (head/nasal/chest)  No Cough/Productive Cough  No Post Nasal Drip   Yes Headache  No Sinus Pain/Pressure  No Myalgias  No Otalgia  Activity Level Changes: Yes: tired  Appetite/Liquid Intake Changes: Yes: decreased  Changes to Bowel Habits: Yes: mild diarrhea  Changes to Bladder Habits: No  Additional Symptoms to Report: Yes: nausea, vomiting - last night around 2200 and prior to arrival    Treatments tried: Tylenol/Ibuprofen, Fluids and Rest    Site of exposure: not known.  Type of exposure: not known    Vaccination status:   - Influenza: 21  - COVID: 21, 21, 21    Cardiopulmonary History:  Recent Infections (Pneumonia, etc): No  Smoker: No  Asthma: Yes  COPD: No  Additional History: No  Cystic Fibrosis: No  Cardiac History Including Stroke/Stent Placement/STEMI/STEMI, etc.: No    Allergies UTD    Past Medical History:   Diagnosis Date     Allergic rhinitis     No Comments Provided     Celiac disease     No Comments Provided     Mass of right index finger at the PIP joint 2018     S/P excision of mass right index finger 2018     Uncomplicated asthma     No Comments Provided     Past Surgical History:   Procedure Laterality Date      SECTION      ,4/10/14, Section     COLONOSCOPY      age 10     ESOPHAGOSCOPY, GASTROSCOPY, DUODENOSCOPY (EGD), COMBINED       [id number],ESOPHAGOGASTRODUODENOSCOPY,age 10, small bowel bx: celiac disease     EXCISE MASS UPPER EXTREMITY Right 2/28/2018    Procedure: EXCISE MASS UPPER EXTREMITY;  Right Index Finger Mass Excision;  Surgeon: Nate Allen DO;  Location: [facility] OR     LAPAROSCOPIC CHOLECYSTECTOMY      9/22/15,Cholecystectomy,Laparoscopic     S/P EXCISION OF MASS RIGHT INDEX FINGER Right 02/28/2018     Social History     Tobacco Use     Smoking status: Never Smoker     Smokeless tobacco: Never Used     Tobacco comment: Quit smoking: passive exposure   Substance Use Topics     Alcohol use: Yes     Alcohol/week: 0.0 standard drinks     Comment: social      Current Outpatient Medications   Medication Sig Dispense Refill     albuterol (PROAIR HFA/PROVENTIL HFA/VENTOLIN HFA) 108 (90 BASE) MCG/ACT Inhaler Inhale 2 puffs into the lungs every 4 hours as needed for wheezing       buPROPion (WELLBUTRIN SR) 100 MG 12 hr tablet Take 100 mg by mouth every morning       buPROPion (WELLBUTRIN XL) 150 MG 24 hr tablet        cyclobenzaprine (FLEXERIL) 10 MG tablet TAKE 1 TABLET BY MOUTH 3 TIMES DAILY IF NEEDED FOR MUSCLE SPASM. 30 tablet 2     EPI E-Z PEN LYNETTE 1:1000  IJ 1 TIME ONLY 1 0     EPINEPHrine (ANY BX GENERIC EQUIV) 0.3 MG/0.3ML injection 2-pack Inject 0.3 mLs (0.3 mg) into the muscle as needed for anaphylaxis 2 each 11     ibuprofen (ADVIL/MOTRIN) 800 MG tablet TAKE 1 TABLET BY MOUTH 3 TIMES DAILY IF NEEDED FOR PAIN. 180 tablet 4     LORazepam (ATIVAN) 0.5 MG tablet TAKE 1 TABLET BY MOUTH 3 TIMES WEEKLY AS NEEDED FOR PANIC ATTACKS.       LORazepam (ATIVAN) 1 MG tablet Take 1 mg by mouth every 6 hours as needed for anxiety       medical cannabis (Patient's own supply) See Admin Instructions (The purpose of this order is to document that the patient reports taking medical cannabis.  This is not a prescription, and is not used to certify that the patient has a qualifying medical condition.)       norgestimate-ethinyl estradiol (MONO-LINYAH)  0.25-35 MG-MCG tablet TAKE 1 TABLET BY MOUTH EVERY DAY *DUE FOR ANNUAL VISIT WITH PCP FOR FUTURE REFILLS* 84 tablet 11     omeprazole (PRILOSEC) 20 MG DR capsule TAKE 1 CAPSULE (20 MG) BY MOUTH DAILY 90 capsule 11     ondansetron (ZOFRAN-ODT) 4 MG ODT tab Take 1 tablet (4 mg) by mouth every 8 hours as needed for nausea 15 tablet 0     risperiDONE (RISPERDAL) 0.5 MG tablet TAKE 1 TABLET (0.5 MG) BY MOUTH TWICE A DAY AT BEDTIME AND AS NEEDED 180 tablet 3     risperiDONE (RISPERDAL) 2 MG tablet TAKE 1 TABLET (2MG) BY MOUTH IN THE MORNING. TAKE 1/2 TABLET (1MG) IN THE EVENING AS NEEDED.       VITAMIN D3 50 MCG (2000 UT) tablet Take 1 tablet by mouth daily       zolpidem (AMBIEN) 10 MG tablet Take 10 mg by mouth daily       Allergies   Allergen Reactions     Amoxicillin-Pot Clavulanate Diarrhea     Augmentin Diarrhea     Yeast Infection in colon     Azithromycin Nausea     Azithromycin      Can't take because it contains wheat     Codeine      constipation     Gluten      Gluten Meal      Other reaction(s): Constipation  Celiac disease, abdominal pain     Food Diarrhea     Other reaction(s): Constipation  Wheat, rye, barley     Past medical history, past surgical history, current medications and allergies reviewed and accurate to the best of my knowledge.      ROS:  Refer to HPI    /86 (BP Location: Left arm, Patient Position: Sitting, Cuff Size: Adult Large)   Pulse 113   Temp 99.9  F (37.7  C) (Tympanic)   Resp 16   Wt 95.9 kg (211 lb 6.4 oz)   LMP 09/22/2022   SpO2 98%   BMI 37.45 kg/m      EXAM:  General Appearance: Well appearing 32 year old female, appropriate appearance for age. No acute distress   Ears: Left TM intact, translucent with bony landmarks appreciated, no erythema, no effusion, no bulging, no purulence.  Right TM intact, translucent with bony landmarks appreciated, no erythema, no effusion, no bulging, no purulence.  Left auditory canal clear.  Right auditory canal clear.  Normal external  ears, non tender.  Eyes: conjunctivae normal without erythema or irritation, corneas clear, no drainage or crusting, no eyelid swelling, pupils equal   Oropharynx: moist mucous membranes, posterior pharynx with erythema, tonsils 2+, mild erythema, no exudates or petechiae, no post nasal drip seen, no trismus, voice clear.    Sinuses:  No sinus tenderness upon palpation of the frontal or maxillary sinuses  Nose:  Bilateral nares: no erythema, no edema, no drainage or congestion   Neck: supple without adenopathy  Respiratory: normal chest wall and respirations.  Normal effort.  Clear to auscultation bilaterally, no wheezing, crackles or rhonchi.  No increased work of breathing.  No cough appreciated.  Cardiac: RRR with no murmurs  Abdomen: soft, nontender, no rigidity, no rebound tenderness or guarding, normal bowel sounds present  Musculoskeletal:  Equal movement of bilateral upper extremities.  Equal movement of bilateral lower extremities.  Normal gait.    Dermatological: no rashes noted of exposed skin  Psychological: normal affect, alert, oriented, and pleasant.     Labs:  Results for orders placed or performed in visit on 09/26/22   Symptomatic; Yes; 9/23/2022 Influenza A/B & SARS-CoV2 (COVID-19) Virus PCR Multiplex Nose     Status: Normal    Specimen: Nose; Swab   Result Value Ref Range    Influenza A PCR Negative Negative    Influenza B PCR Negative Negative    RSV PCR Negative Negative    SARS CoV2 PCR Negative Negative    Narrative    Testing was performed using the Xpert Xpress CoV2/Flu/RSV Assay on the [employer] GeneXpert Instrument. This test should be ordered for the detection of SARS-CoV-2 and influenza viruses in individuals who meet clinical and/or epidemiological criteria. Test performance is unknown in asymptomatic patients. This test is for in vitro diagnostic use under the FDA EUA for laboratories certified under CLIA to perform high or moderate complexity testing. This test has not been FDA cleared  or approved. A negative result does not rule out the presence of PCR inhibitors in the specimen or target RNA in concentration below the limit of detection for the assay. If only one viral target is positive but coinfection with multiple targets is suspected, the sample should be re-tested with another FDA cleared, approved, or authorized test, if coinfection would change clinical management. This test was validated by the [facility]. These laboratories are certified under the Clinical  Laboratory Improvement Amendments of 1988 (CLIA-88) as qualified to perform high complexity laboratory testing.   UA reflex to Microscopic and Culture     Status: Abnormal    Specimen: Urine, Midstream   Result Value Ref Range    Color Urine Light Yellow Colorless, Straw, Light Yellow, Yellow    Appearance Urine Clear Clear    Glucose Urine Negative Negative mg/dL    Bilirubin Urine Negative Negative    Ketones Urine 20  (A) Negative mg/dL    Specific Gravity Urine 1.013 1.000 - 1.030    Blood Urine Trace (A) Negative    pH Urine 6.0 5.0 - 9.0    Protein Albumin Urine 100  (A) Negative mg/dL    Urobilinogen Urine Normal Normal, 2.0 mg/dL    Nitrite Urine Negative Negative    Leukocyte Esterase Urine Negative Negative    Mucus Urine Present (A) None Seen /LPF    RBC Urine <1 <=2 /HPF    WBC Urine 1 <=5 /HPF    Narrative    Urine Culture not indicated   Group A Streptococcus PCR Throat Swab     Status: Normal    Specimen: Throat; Swab   Result Value Ref Range    Group A strep by PCR Not Detected Not Detected    Narrative    The Xpert Xpress Strep A test, performed on the [employer]  Instrument Systems, is a rapid, qualitative in vitro diagnostic test for the detection of Streptococcus pyogenes (Group A ß-hemolytic Streptococcus, Strep A) in throat swab specimens from patients with signs and symptoms of pharyngitis. The Xpert Xpress Strep A test can be used as an aid in the diagnosis of Group A Streptococcal  pharyngitis. The assay is not intended to monitor treatment for Group A Streptococcus infections. The Xpert Xpress Strep A test utilizes an automated real-time polymerase chain reaction (PCR) to detect Streptococcus pyogenes DNA.     Xray:  None

## 2022-09-26 NOTE — NURSING NOTE
"Chief Complaint   Patient presents with     Headache     Vomiting       FOOD SECURITY SCREENING QUESTIONS  Hunger Vital Signs:  Within the past 12 months we worried whether our food would run out before we got money to buy more. Never  Within the past 12 months the food we bought just didn't last and we didn't have money to get more. Never  Yane Greco LPN 9/26/2022 2:19 PM      Initial /86 (BP Location: Left arm, Patient Position: Sitting, Cuff Size: Adult Large)   Pulse 113   Temp 99.9  F (37.7  C) (Tympanic)   Resp 16   Wt 95.9 kg (211 lb 6.4 oz)   LMP 09/22/2022   SpO2 98%   BMI 37.45 kg/m   Estimated body mass index is 37.45 kg/m  as calculated from the following:    Height as of 7/6/22: 1.6 m (5' 3\").    Weight as of this encounter: 95.9 kg (211 lb 6.4 oz).  Medication Reconciliation: complete    Yane Greco LPN  "

## 2022-10-16 ENCOUNTER — MYC MEDICAL ADVICE (OUTPATIENT)
Dept: FAMILY MEDICINE | Facility: OTHER | Age: 32
End: 2022-10-16

## 2022-10-16 DIAGNOSIS — U07.1 PNEUMONIA DUE TO COVID-19 VIRUS: ICD-10-CM

## 2022-10-16 DIAGNOSIS — J12.82 PNEUMONIA DUE TO COVID-19 VIRUS: ICD-10-CM

## 2022-10-16 DIAGNOSIS — J45.41 MODERATE PERSISTENT ASTHMA WITH ACUTE EXACERBATION: Primary | ICD-10-CM

## 2022-10-17 RX ORDER — FLUTICASONE PROPIONATE 110 UG/1
1 AEROSOL, METERED RESPIRATORY (INHALATION) 2 TIMES DAILY
Qty: 12 G | Refills: 0 | Status: SHIPPED | OUTPATIENT
Start: 2022-10-17 | End: 2023-10-31

## 2022-10-17 RX ORDER — ALBUTEROL SULFATE 90 UG/1
2 AEROSOL, METERED RESPIRATORY (INHALATION) EVERY 4 HOURS PRN
Qty: 18 G | Refills: 11 | Status: SHIPPED | OUTPATIENT
Start: 2022-10-17 | End: 2023-07-13

## 2023-02-10 DIAGNOSIS — M62.838 MUSCLE SPASM: ICD-10-CM

## 2023-02-14 NOTE — TELEPHONE ENCOUNTER
CYCLOBENZAPRINE 10MG TABLET        Last Written Prescription Date:  8/9/22  Last Fill Quantity: 30,   # refills: 2  Last Office Visit: 8/22/22  Future Office visit:       Routing refill request to provider for review/approval because:  Drug not on the [facility] [place] or [facility] refill protocol or controlled substance. Unable to complete prescription refill per RNMedication Refill Policy.................... Cassandra Robbins RN ....................  2/14/2023   4:57 PM

## 2023-02-15 RX ORDER — CYCLOBENZAPRINE HCL 10 MG
TABLET ORAL
Qty: 30 TABLET | Refills: 2 | Status: SHIPPED | OUTPATIENT
Start: 2023-02-15 | End: 2023-08-31

## 2023-03-14 ENCOUNTER — OFFICE VISIT (OUTPATIENT)
Dept: FAMILY MEDICINE | Facility: OTHER | Age: 33
End: 2023-03-14
Payer: COMMERCIAL

## 2023-03-14 VITALS
HEIGHT: 63 IN | SYSTOLIC BLOOD PRESSURE: 128 MMHG | RESPIRATION RATE: 20 BRPM | WEIGHT: 200.13 LBS | DIASTOLIC BLOOD PRESSURE: 70 MMHG | HEART RATE: 100 BPM | BODY MASS INDEX: 35.46 KG/M2 | OXYGEN SATURATION: 97 % | TEMPERATURE: 96.9 F

## 2023-03-14 DIAGNOSIS — J45.41 MODERATE PERSISTENT ASTHMATIC BRONCHITIS WITH ACUTE EXACERBATION: Primary | ICD-10-CM

## 2023-03-14 DIAGNOSIS — J01.90 ACUTE SINUSITIS WITH SYMPTOMS > 10 DAYS: ICD-10-CM

## 2023-03-14 PROCEDURE — G0463 HOSPITAL OUTPT CLINIC VISIT: HCPCS

## 2023-03-14 PROCEDURE — 99214 OFFICE O/P EST MOD 30 MIN: CPT | Performed by: NURSE PRACTITIONER

## 2023-03-14 RX ORDER — PREDNISONE 20 MG/1
40 TABLET ORAL DAILY
Qty: 10 TABLET | Refills: 0 | Status: SHIPPED | OUTPATIENT
Start: 2023-03-14 | End: 2023-03-19

## 2023-03-14 RX ORDER — ZOLPIDEM TARTRATE 12.5 MG/1
TABLET, FILM COATED, EXTENDED RELEASE ORAL
COMMUNITY
Start: 2023-02-20

## 2023-03-14 RX ORDER — DOXYCYCLINE 100 MG/1
100 CAPSULE ORAL 2 TIMES DAILY
Qty: 14 CAPSULE | Refills: 0 | Status: SHIPPED | OUTPATIENT
Start: 2023-03-14 | End: 2023-03-21

## 2023-03-14 ASSESSMENT — PAIN SCALES - GENERAL: PAINLEVEL: MILD PAIN (3)

## 2023-03-14 NOTE — NURSING NOTE
Patient presents to clinic experiencing Asthma excerebration, coughing all night, sinus drainage and chest congestion for past 2 weeks.    Medication Rec Complete  Lidia Johnston LPN............3/14/2023 12:21 PM

## 2023-03-14 NOTE — LETTER
[facility]  [address]  [place] [address]  Phone: [phone]  Fax: [phone]    March 14, 2023        Ita Poole  [address] [place] [id number]          To whom it may concern:    RE: Ita Poole    Patient was seen and treated today at our clinic and missed work on 3/10/23 through 3/14/23 due to illness.    Please contact me for questions or concerns.      Sincerely,        Negra Arvizu, NP

## 2023-03-14 NOTE — PROGRESS NOTES
ASSESSMENT/PLAN:     I have reviewed the nursing notes.  I have reviewed the findings, diagnosis, plan and need for follow up with the patient.      1. Moderate persistent asthmatic bronchitis with acute exacerbation    - doxycycline hyclate (VIBRAMYCIN) 100 MG capsule; Take 1 capsule (100 mg) by mouth 2 times daily for 7 days  Dispense: 14 capsule; Refill: 0  - predniSONE (DELTASONE) 20 MG tablet; Take 2 tablets (40 mg) by mouth daily for 5 days  Dispense: 10 tablet; Refill: 0      Continue Flovent inhaler BID  Continue Albuterol inhaler Q 4 hours PRN    2. Acute sinusitis with symptoms > 10 days    - doxycycline hyclate (VIBRAMYCIN) 100 MG capsule; Take 1 capsule (100 mg) by mouth 2 times daily for 7 days  Dispense: 14 capsule; Refill: 0    Symptomatic treatment - Encouraged fluids, salt water gargles, honey, elevation, humidifier, saline nasal spray, sinus rinse/netti pot, lozenges, tea, soup, smoothies, popsicles, topical vapor rub, rest, etc     May use over-the-counter Tylenol or ibuprofen PRN    Discussed warning signs/symptoms indicative of need to f/u  Follow up if symptoms persist or worsen or concerns      I explained my diagnostic considerations and recommendations to the patient, who voiced understanding and agreement with the treatment plan. All questions were answered. We discussed potential side effects of any prescribed or recommended therapies, as well as expectations for response to treatments.    Negra Arvizu NP  [facility]      SUBJECTIVE:   Ita Poole is a 33 year old female who presents to clinic today for the following health issues:  Asthma and cough    HPI  Nasal drainage and post nasal drainage, cough, coughing fits, sneezing, chest tightness, wheezing, and sinus headaches for the past 2 weeks with worsening.  No fevers or chills.  Denies sore throat.    Tried Mucinex DM without relief.  Using Flovent during illness and Albuterol inhaler frequently.        Past  Medical History:   Diagnosis Date     Allergic rhinitis     No Comments Provided     Celiac disease     No Comments Provided     Mass of right index finger at the PIP joint 2018     S/P excision of mass right index finger 2018     Uncomplicated asthma     No Comments Provided     Past Surgical History:   Procedure Laterality Date      SECTION      ,4/10/14, Section     COLONOSCOPY      age 10     ESOPHAGOSCOPY, GASTROSCOPY, DUODENOSCOPY (EGD), COMBINED      [id number],ESOPHAGOGASTRODUODENOSCOPY,age 10, small bowel bx: celiac disease     EXCISE MASS UPPER EXTREMITY Right 2018    Procedure: EXCISE MASS UPPER EXTREMITY;  Right Index Finger Mass Excision;  Surgeon: Nate Allen DO;  Location: [facility] OR     LAPAROSCOPIC CHOLECYSTECTOMY      9/22/15,Cholecystectomy,Laparoscopic     S/P EXCISION OF MASS RIGHT INDEX FINGER Right 2018     Social History     Tobacco Use     Smoking status: Never     Smokeless tobacco: Never     Tobacco comments:     Quit smoking: passive exposure   Substance Use Topics     Alcohol use: Yes     Alcohol/week: 0.0 standard drinks     Comment: social      Current Outpatient Medications   Medication Sig Dispense Refill     albuterol (PROAIR HFA/PROVENTIL HFA/VENTOLIN HFA) 108 (90 Base) MCG/ACT inhaler Inhale 2 puffs into the lungs every 4 hours as needed for wheezing 18 g 11     buPROPion (WELLBUTRIN XL) 150 MG 24 hr tablet        cyclobenzaprine (FLEXERIL) 10 MG tablet TAKE 1 TABLET BY MOUTH 3 TIMES DAILY IF NEEDED FOR MUSCLE SPASM. 30 tablet 2     EPINEPHrine (ANY BX GENERIC EQUIV) 0.3 MG/0.3ML injection 2-pack Inject 0.3 mLs (0.3 mg) into the muscle as needed for anaphylaxis 2 each 11     fluticasone (FLOVENT HFA) 110 MCG/ACT inhaler Inhale 1 puff into the lungs 2 times daily 12 g 0     ibuprofen (ADVIL/MOTRIN) 800 MG tablet TAKE 1 TABLET BY MOUTH 3 TIMES DAILY IF NEEDED FOR PAIN. 180 tablet 4     medical cannabis (Patient's own supply) See Admin  Instructions (The purpose of this order is to document that the patient reports taking medical cannabis.  This is not a prescription, and is not used to certify that the patient has a qualifying medical condition.)       norgestimate-ethinyl estradiol (MONO-LINYAH) 0.25-35 MG-MCG tablet TAKE 1 TABLET BY MOUTH EVERY DAY *DUE FOR ANNUAL VISIT WITH PCP FOR FUTURE REFILLS* 84 tablet 11     ondansetron (ZOFRAN ODT) 4 MG ODT tab Take 1 tablet (4 mg) by mouth every 8 hours as needed for nausea 30 tablet 0     risperiDONE (RISPERDAL) 2 MG tablet TAKE 1 TABLET (2MG) BY MOUTH IN THE MORNING. TAKE 1/2 TABLET (1MG) IN THE EVENING AS NEEDED.       VITAMIN D3 50 MCG (2000 UT) tablet Take 1 tablet by mouth daily       LORazepam (ATIVAN) 1 MG tablet Take 1 mg by mouth every 6 hours as needed for anxiety (Patient not taking: Reported on 3/14/2023)       omeprazole (PRILOSEC) 20 MG DR capsule TAKE 1 CAPSULE (20 MG) BY MOUTH DAILY (Patient not taking: Reported on 3/14/2023) 90 capsule 11     ondansetron (ZOFRAN-ODT) 4 MG ODT tab Take 1 tablet (4 mg) by mouth every 8 hours as needed for nausea (Patient not taking: Reported on 3/14/2023) 15 tablet 0     zolpidem ER (AMBIEN CR) 12.5 MG CR tablet TAKE 1 TABLET (12.5MG) BY MOUTH AT BEDTIME       Allergies   Allergen Reactions     Amoxicillin-Pot Clavulanate Diarrhea     Cannot have due to wheat     Augmentin Diarrhea     Yeast Infection in colon     Azithromycin Nausea     Azithromycin      Can't take because it contains wheat     Codeine      constipation     Gluten      Gluten Meal      Other reaction(s): Constipation  Celiac disease, abdominal pain     Food Diarrhea     Other reaction(s): Constipation  Wheat, rye, barley         Past medical history, past surgical history, current medications and allergies reviewed and accurate to the best of my knowledge.        OBJECTIVE:     /70 (BP Location: Left arm, Patient Position: Sitting, Cuff Size: Adult Large)   Pulse 100   Temp 96.9  " F (36.1  C) (Tympanic)   Resp 20   Ht 1.6 m (5' 3\")   Wt 90.8 kg (200 lb 2 oz)   LMP  (Approximate)   SpO2 97%   BMI 35.45 kg/m    Body mass index is 35.45 kg/m .     Physical Exam  General Appearance: Miserable appearing adult female, appropriate appearance for age. No acute distress  Ears: Left TM intact, no erythema, no effusion, no bulging, no purulence.  Right TM intact, no erythema, no effusion, no bulging, no purulence.  Left auditory canal clear without drainage or bleeding.  Right auditory canal clear without drainage or bleeding.  Normal external ears, non tender.  Eyes: conjunctivae normal without erythema or irritation, corneas clear, no drainage or crusting, no eyelid swelling, pupils equal   Orophayrnx: voice clear.    Sinuses:  No sinus tenderness upon palpation of the frontal or maxillary sinuses  Nose:  Congestion present  Neck: supple without adenopathy  Respiratory: normal chest wall and respirations.  Normal effort.  Clear to auscultation bilaterally, no wheezing, crackles or rhonchi.  No increased work of breathing.  Congested cough appreciated.  Musculoskeletal:  Equal movement of bilateral upper extremities.  Equal movement of bilateral lower extremities.  Normal gait.    Psychological: normal affect, alert, oriented, and pleasant.       "

## 2023-03-25 ENCOUNTER — HEALTH MAINTENANCE LETTER (OUTPATIENT)
Age: 33
End: 2023-03-25

## 2023-06-13 ENCOUNTER — MYC MEDICAL ADVICE (OUTPATIENT)
Dept: FAMILY MEDICINE | Facility: OTHER | Age: 33
End: 2023-06-13
Payer: COMMERCIAL

## 2023-06-29 ENCOUNTER — OFFICE VISIT (OUTPATIENT)
Dept: SURGERY | Facility: OTHER | Age: 33
End: 2023-06-29
Attending: SURGERY
Payer: COMMERCIAL

## 2023-06-29 VITALS
BODY MASS INDEX: 32.95 KG/M2 | SYSTOLIC BLOOD PRESSURE: 110 MMHG | HEART RATE: 94 BPM | DIASTOLIC BLOOD PRESSURE: 70 MMHG | WEIGHT: 186 LBS | RESPIRATION RATE: 16 BRPM | TEMPERATURE: 97.2 F | OXYGEN SATURATION: 98 %

## 2023-06-29 DIAGNOSIS — Z98.890 HISTORY OF BILATERAL BREAST REDUCTION SURGERY: ICD-10-CM

## 2023-06-29 DIAGNOSIS — N64.4 PAIN OF LEFT BREAST: ICD-10-CM

## 2023-06-29 DIAGNOSIS — N63.21 BREAST LUMP ON LEFT SIDE AT 2 O'CLOCK POSITION: Primary | ICD-10-CM

## 2023-06-29 PROCEDURE — G0463 HOSPITAL OUTPT CLINIC VISIT: HCPCS

## 2023-06-29 PROCEDURE — 99203 OFFICE O/P NEW LOW 30 MIN: CPT | Performed by: SURGERY

## 2023-06-29 RX ORDER — LORAZEPAM 0.5 MG/1
TABLET ORAL
COMMUNITY
Start: 2023-06-22

## 2023-06-29 RX ORDER — BUPROPION HYDROCHLORIDE 300 MG/1
300 TABLET ORAL EVERY MORNING
COMMUNITY
Start: 2023-06-19

## 2023-06-29 ASSESSMENT — PAIN SCALES - GENERAL: PAINLEVEL: NO PAIN (0)

## 2023-06-29 NOTE — PROGRESS NOTES
OFFICE CONSULTATION NOTE  Patient Name: Ita Poole  Address: [address] [address]  Age:33 year old  Sex: female     Primary Care Physician: Will Reynoso MD    I was requested to see this patient in consultation by  Will Reynoso MD  for evaluation of left breast lump. A copy of this note will be sent to Will Reynoso MD.    HPI:   The patient is 33 year old female with a new lump in her left breast. She has some zinging breast pain on the left since her breast reduction surgery about 3 years ago. She has no nipple drainage bilaterally. She has not noted any new skin lesions on the breasts. She thinks the nodule is about the same since she first noted it about a week ago. The nodule not tender.   No family history of breast cancer.   The patient hasn't had biopsy performed. She hasn't had imaging performed.     CONSULTATION ASSESSMENT ANDPLAN/RECOMMENDATIONS:   I discussed with the patient the pathophysiology of breast nodules and breast disease. We specifically discussed that most lumps are not breast cancer. I recommend imaging with mammogram-bilaterally and US of the left breast. She will follow up in the office for any concerns.  The patient will call with questions or concerns prior imaging.  We did discuss that good support with a non-under wire sports bra is helpful in preventing breast pain and tenderness. If a trial of good support doesn't improve her pain, would recommend evaluation by Arielle with physical therapy. Patient in agreement and will let us know.    REVIEW OF SYSTEMS  GENERAL: No fevers or chills. Denies fatigue, recent weight loss.  HEENT:No sinus drainage. No changes with vision or hearing. No difficulty swallowing.   LYMPHATICS:  No swollen nodes in axilla, neck or groin.  CARDIOVASCULAR: Denies chest pain, palpitations and dyspnea on exertion.  PULMONARY: No shortness of breath or cough. No increase in sputum production.  GI: Denies melena, bright red blood in  stools. No hematemesis. No constipation or diarrhea.  : No dysuria or hematuria.  SKIN: Norecent rashes or ulcers.   HEMATOLOGY:  No history of easy bruising or bleeding.  ENDOCRINE:  No history of diabetes or thyroid problems.  NEUROLOGY:  No history of seizures or headaches. No motor or sensory changes.  BREAST:  As above.  Past Medical History:   Diagnosis Date     Allergic rhinitis     No Comments Provided     Celiac disease     No Comments Provided     Mass of right index finger at the PIP joint 2018     S/P excision of mass right index finger 2018     Uncomplicated asthma     No Comments Provided     Past Surgical History:   Procedure Laterality Date      SECTION      ,4/10/14, Section     COLONOSCOPY      age 10     ESOPHAGOSCOPY, GASTROSCOPY, DUODENOSCOPY (EGD), COMBINED      [id number],ESOPHAGOGASTRODUODENOSCOPY,age 10, small bowel bx: celiac disease     EXCISE MASS UPPER EXTREMITY Right 2018    Procedure: EXCISE MASS UPPER EXTREMITY;  Right Index Finger Mass Excision;  Surgeon: Nate Allen DO;  Location: [facility] OR     LAPAROSCOPIC CHOLECYSTECTOMY      9/22/15,Cholecystectomy,Laparoscopic     S/P EXCISION OF MASS RIGHT INDEX FINGER Right 2018     Current Outpatient Medications   Medication     albuterol (PROAIR HFA/PROVENTIL HFA/VENTOLIN HFA) 108 (90 Base) MCG/ACT inhaler     buPROPion (WELLBUTRIN XL) 300 MG 24 hr tablet     cyclobenzaprine (FLEXERIL) 10 MG tablet     EPINEPHrine (ANY BX GENERIC EQUIV) 0.3 MG/0.3ML injection 2-pack     fluticasone (FLOVENT HFA) 110 MCG/ACT inhaler     ibuprofen (ADVIL/MOTRIN) 800 MG tablet     LORazepam (ATIVAN) 0.5 MG tablet     LORazepam (ATIVAN) 1 MG tablet     medical cannabis (Patient's own supply)     norgestimate-ethinyl estradiol (MONO-LINYAH) 0.25-35 MG-MCG tablet     risperiDONE (RISPERDAL) 2 MG tablet     VITAMIN D3 50 MCG ( UT) tablet     zolpidem ER (AMBIEN CR) 12.5 MG CR tablet     omeprazole (PRILOSEC) 20 MG   capsule     ondansetron (ZOFRAN ODT) 4 MG ODT tab     ondansetron (ZOFRAN-ODT) 4 MG ODT tab     No current facility-administered medications for this visit.     ALLERGIES/SENSITIVITIES  Allergies   Allergen Reactions     Amoxicillin-Pot Clavulanate Diarrhea     Yeast Infection in colon     Amoxicillin-Pot Clavulanate Diarrhea     Cannot have due to wheat     Azithromycin Nausea     Azithromycin      Can't take because it contains wheat     Codeine      constipation     Gluten      Gluten Meal      Other reaction(s): Constipation  Celiac disease, abdominal pain     Food Diarrhea     Other reaction(s): Constipation  Wheat, rye, barley     Family History   Problem Relation Age of Onset     Asthma Mother         Asthma     Allergy (Severe) Mother         Allergies     Asthma Father         Asthma     Heart Disease Father 43        Heart Disease, of MI     Diabetes Father         Diabetes     Asthma Maternal Grandmother         Asthma     Diabetes Paternal Grandfather         Diabetes     Social History     Socioeconomic History     Marital status: Single     Spouse name: None     Number of children: None     Years of education: None     Highest education level: None   Tobacco Use     Smoking status: Never     Smokeless tobacco: Never     Tobacco comments:     Quit smoking: passive exposure   Vaping Use     Vaping Use: Never used   Substance and Sexual Activity     Alcohol use: Yes     Comment: social - one per month     Drug use: Yes     Types: Marijuana     Comment: medical      Sexual activity: Yes     Partners: Male     Birth control/protection: None       The above history was reviewed and updated today, 2023  PHYSICAL EXAM  /70 (BP Location: Right arm, Patient Position: Sitting, Cuff Size: Adult Large)   Pulse 94   Temp 97.2  F (36.2  C) (Tympanic)   Resp 16   Wt 84.4 kg (186 lb)   LMP 2023 (Exact Date)   SpO2 98%   BMI 32.95 kg/m    GENERAL: Healthy appearing patient in no acute  distress. Pleasant and cooperative with exam and interview.   HEENT:Head-normocephalic. Eyes-no scleral icterus. Nose-no nasal drainage. No lesions. Mouth-oral mucosa pink and moist, no lesions.  NECK: Supple. No thyroid nodules. Trachea midline.  LYMPHATICS:  No cervical, axillary or supraclavicular adenopathy.  SKIN: Pink, warm and dry. No jaundice. No rash.  NEURO:  Cranial nerves II-XII grossly intact.Alert and oriented.  PSYCH: Appropriate mood and affect.  BREAST: Breasts were examined in the seated and supine position.areas of dense breast tissue noted bilaterally-more prominent left at 2 o'clock position. Well healed reduction scars. No acute nipple changes or discharge bilaterally. Appropriate tenderness noted.    IMAGING/LAB  None

## 2023-06-29 NOTE — NURSING NOTE
"Chief Complaint   Patient presents with     Consult     Left breast lesion/ mass       Initial /70 (BP Location: Right arm, Patient Position: Sitting, Cuff Size: Adult Large)   Pulse 94   Temp 97.2  F (36.2  C) (Tympanic)   Resp 16   Wt 84.4 kg (186 lb)   LMP 06/12/2023 (Exact Date)   SpO2 98%   BMI 32.95 kg/m   Estimated body mass index is 32.95 kg/m  as calculated from the following:    Height as of 3/14/23: 1.6 m (5' 3\").    Weight as of this encounter: 84.4 kg (186 lb).  Medication Reconciliation: complete    What age did your menstrual cycle start? 11  Are you on or have you ever taken any hormone replacement or birth control? Birth control  How many children do you have? 2  How old were you when your first child was born? 21  Did you breast feed? yes  Do you have a family history of breast cancer? Maternal great grandma  Abida Low LPN..........6/29/2023  9:15 AM    "

## 2023-06-29 NOTE — PATIENT INSTRUCTIONS
You will get a call to schedule bilateral diagnostic mammogram and left breast US. If they don't find any concerns, they will let you know that day. If there are any findings that need further evaluation, I will discuss with you in the office.   Try the sports bra for the breast pain. If not improved in the next few weeks, let me know, we can discuss it further.   Please call with concerns or questions!

## 2023-07-12 ENCOUNTER — HOSPITAL ENCOUNTER (OUTPATIENT)
Dept: MAMMOGRAPHY | Facility: OTHER | Age: 33
Discharge: HOME | End: 2023-07-12
Attending: SURGERY
Payer: COMMERCIAL

## 2023-07-12 ENCOUNTER — HOSPITAL ENCOUNTER (OUTPATIENT)
Dept: ULTRASOUND IMAGING | Facility: OTHER | Age: 33
Discharge: HOME | End: 2023-07-12
Attending: SURGERY
Payer: COMMERCIAL

## 2023-07-12 DIAGNOSIS — N63.21 BREAST LUMP ON LEFT SIDE AT 2 O'CLOCK POSITION: ICD-10-CM

## 2023-07-12 PROCEDURE — 77062 BREAST TOMOSYNTHESIS BI: CPT

## 2023-07-12 PROCEDURE — 76642 ULTRASOUND BREAST LIMITED: CPT | Mod: LT

## 2023-07-13 ENCOUNTER — MYC MEDICAL ADVICE (OUTPATIENT)
Dept: FAMILY MEDICINE | Facility: OTHER | Age: 33
End: 2023-07-13
Payer: COMMERCIAL

## 2023-07-13 DIAGNOSIS — J45.41 MODERATE PERSISTENT ASTHMA WITH ACUTE EXACERBATION: ICD-10-CM

## 2023-07-13 RX ORDER — ALBUTEROL SULFATE 90 UG/1
2 AEROSOL, METERED RESPIRATORY (INHALATION) EVERY 4 HOURS PRN
Qty: 18 G | Refills: 11 | Status: SHIPPED | OUTPATIENT
Start: 2023-07-13

## 2023-07-13 RX ORDER — PREDNISONE 20 MG/1
TABLET ORAL
Qty: 18 TABLET | Refills: 0 | Status: SHIPPED | OUTPATIENT
Start: 2023-07-13 | End: 2023-09-27

## 2023-08-19 ENCOUNTER — HEALTH MAINTENANCE LETTER (OUTPATIENT)
Age: 33
End: 2023-08-19

## 2023-08-23 DIAGNOSIS — M62.838 MUSCLE SPASM: ICD-10-CM

## 2023-08-31 RX ORDER — CYCLOBENZAPRINE HCL 10 MG
TABLET ORAL
Qty: 30 TABLET | Refills: 4 | Status: SHIPPED | OUTPATIENT
Start: 2023-08-31

## 2023-08-31 NOTE — TELEPHONE ENCOUNTER
[id number] ("[facility] of [place] sent Rx request for the following:      Requested Prescriptions   Pending Prescriptions Disp Refills    cyclobenzaprine (FLEXERIL) 10 MG tablet [Pharmacy Med Name: CYCLOBENZAPRINE 10MG TABLET] 30 tablet 2     Sig: TAKE 1 TABLET BY MOUTH 3 TIMES DAILY IF NEEDED FOR MUSCLE SPASM.   Last Prescription Date:   2/15/23  Last Fill Qty/Refills:         30, R-2    Last Office Visit:              8/22/22   Future Office visit:           None    Pt due for annual exam. Routing to provider for refill consideration. Routing to Unit scheduling pool, to assist Pt in scheduling appointment.     Lidia Fernandez RN .............. 8/31/2023  9:17 AM

## 2023-09-06 DIAGNOSIS — N92.6 IRREGULAR MENSES: ICD-10-CM

## 2023-09-12 NOTE — TELEPHONE ENCOUNTER
[facility] sent Rx request for the following:    MONO-LINYAH 0.25MG-0.035MG TAB   Last Prescription Date:   8/22/22  Last Fill Qty/Refills:         84, R-11    Last Office Visit:              8/22/22   Future Office visit:           none    Due for annual exam, please call to schedule.    Flor Lindo RN on 9/12/2023 at 9:08 AM

## 2023-09-13 RX ORDER — NORGESTIMATE AND ETHINYL ESTRADIOL 0.25-0.035
KIT ORAL
Qty: 84 TABLET | Refills: 11 | Status: SHIPPED | OUTPATIENT
Start: 2023-09-13 | End: 2024-09-23

## 2023-09-27 ENCOUNTER — MYC MEDICAL ADVICE (OUTPATIENT)
Dept: FAMILY MEDICINE | Facility: OTHER | Age: 33
End: 2023-09-27
Payer: COMMERCIAL

## 2023-09-27 ENCOUNTER — HOSPITAL ENCOUNTER (OUTPATIENT)
Dept: GENERAL RADIOLOGY | Facility: OTHER | Age: 33
Discharge: HOME | End: 2023-09-27
Attending: STUDENT IN AN ORGANIZED HEALTH CARE EDUCATION/TRAINING PROGRAM
Payer: COMMERCIAL

## 2023-09-27 ENCOUNTER — OFFICE VISIT (OUTPATIENT)
Dept: FAMILY MEDICINE | Facility: OTHER | Age: 33
End: 2023-09-27
Attending: NURSE PRACTITIONER
Payer: COMMERCIAL

## 2023-09-27 VITALS
RESPIRATION RATE: 18 BRPM | OXYGEN SATURATION: 96 % | BODY MASS INDEX: 32.27 KG/M2 | TEMPERATURE: 97.8 F | HEIGHT: 63 IN | DIASTOLIC BLOOD PRESSURE: 88 MMHG | WEIGHT: 182.1 LBS | HEART RATE: 84 BPM | SYSTOLIC BLOOD PRESSURE: 136 MMHG

## 2023-09-27 DIAGNOSIS — R05.1 ACUTE COUGH: ICD-10-CM

## 2023-09-27 DIAGNOSIS — J45.21 MILD INTERMITTENT ASTHMA WITH EXACERBATION: Primary | ICD-10-CM

## 2023-09-27 PROCEDURE — 99214 OFFICE O/P EST MOD 30 MIN: CPT | Performed by: STUDENT IN AN ORGANIZED HEALTH CARE EDUCATION/TRAINING PROGRAM

## 2023-09-27 PROCEDURE — 71046 X-RAY EXAM CHEST 2 VIEWS: CPT

## 2023-09-27 PROCEDURE — G0463 HOSPITAL OUTPT CLINIC VISIT: HCPCS | Mod: 25

## 2023-09-27 RX ORDER — BENZONATATE 100 MG/1
100 CAPSULE ORAL 3 TIMES DAILY PRN
Qty: 30 CAPSULE | Refills: 0 | Status: SHIPPED | OUTPATIENT
Start: 2023-09-27 | End: 2023-10-04

## 2023-09-27 RX ORDER — PREDNISONE 10 MG/1
40 TABLET ORAL DAILY
Qty: 30 TABLET | Refills: 0 | Status: SHIPPED | OUTPATIENT
Start: 2023-09-27 | End: 2023-10-02

## 2023-09-27 ASSESSMENT — PATIENT HEALTH QUESTIONNAIRE - PHQ9
SUM OF ALL RESPONSES TO PHQ QUESTIONS 1-9: 0
SUM OF ALL RESPONSES TO PHQ QUESTIONS 1-9: 0
10. IF YOU CHECKED OFF ANY PROBLEMS, HOW DIFFICULT HAVE THESE PROBLEMS MADE IT FOR YOU TO DO YOUR WORK, TAKE CARE OF THINGS AT HOME, OR GET ALONG WITH OTHER PEOPLE: NOT DIFFICULT AT ALL

## 2023-09-27 ASSESSMENT — ASTHMA QUESTIONNAIRES: ACT_TOTALSCORE: 21

## 2023-09-27 NOTE — NURSING NOTE
"Chief Complaint   Patient presents with    Cough     Cough, Congested, diarrhea, vomiting, body aches x 6-7 Days        Initial BP (!) 152/110 (BP Location: Left arm, Patient Position: Chair, Cuff Size: Adult Regular)   Pulse 97   Temp 97.8  F (36.6  C) (Temporal)   Resp 18   Ht 1.6 m (5' 3\")   Wt 82.6 kg (182 lb 1.6 oz)   LMP 09/18/2023 (Exact Date)   SpO2 95%   BMI 32.26 kg/m   Estimated body mass index is 32.26 kg/m  as calculated from the following:    Height as of this encounter: 1.6 m (5' 3\").    Weight as of this encounter: 82.6 kg (182 lb 1.6 oz).    FOOD SECURITY SCREENING QUESTIONS:    The next two questions are to help us understand your food security.  If you are feeling you need any assistance in this area, we have resources available to support you today.    Hunger Vital Signs:  Within the past 12 months we worried whether our food would run out before we got money to buy more. Never  Within the past 12 months the food we bought just didn't last and we didn't have money to get more. Never    Medication Reconciliation: Complete.       Nini Dale LPN on 9/27/2023 at 6:34 PM     "

## 2023-09-27 NOTE — LETTER
September 27, 2023      Ita Poole  [address] [place] [id number]        To Whom It May Concern:    Ita Poole  was seen on 9/27/23.  Please excuse her for the rest of day today. She can return wearing a mask tomorrow 9/28/23.         Sincerely,        Teresa Escamilla PA-C

## 2023-09-28 NOTE — PROGRESS NOTES
Assessment & Plan     (J45.21) Mild intermittent asthma with exacerbation  (primary encounter diagnosis)    Comment: Asthma exacerbation, most likely caused by viral illness.  She had not had any COVID testing, she is 6 days into symptoms.  No indication for testing at this time, she will return to work wearing a mask.  Her oxygen is 96%, afebrile, not tachycardic at this time.  X-ray imaging was completed today without any evidence of consolidation, pleural effusion, or pneumothorax.    Plan: predniSONE (DELTASONE) 10 MG tablet,         benzonatate (TESSALON) 100 MG capsule    Plan to treat as asthma exacerbation with prednisone, 40 mg once a day for 5 days.  Tessalon Perles as needed for cough.  Continue over-the-counter management.  Return to work as tolerated wearing a mask.  Follow-up with primary care if symptoms do continue to persist.  Return to rapid clinic or go to the ER if symptoms worsen or change.  She is comfortable and agreeable with this plan.    (R05.1) Acute cough  Comment: X-ray imaging completed today without any evidence of pneumonia, pleural effusion, pneumothorax.  Most likely postviral cough alongside asthma exacerbation.  Plan: XR Chest 2 Views, benzonatate (TESSALON) 100 MG        capsule, CANCELED: XR Chest 2 Views              Teresa Escamilla PA-C  [facility]    Juliana Jean Baptiste is a 33 year old, presenting for the following health issues:  Cough (Cough, Congested, diarrhea, vomiting, body aches x 6-7 Days )      HPI     Patient presents with a 6-day history of cough, congestion, sore throat, headaches, and fatigue.  It did start off with body aches as well.  She notes that the cough does continue to persist.  She notes that she feels like she is taking shallow breaths because she does not want to wheeze and cough.  She has been using her as prescribed inhalers for her asthma.  States that she has been using them much more frequently than usual.  She did not  "do any at home COVID testing.  Some vomiting early on, this is resolved.  She does endorse continue to drink plenty of fluids.  She has had a couple episodes of soft stools as well.      Review of Systems   Constitutional, HEENT, cardiovascular, pulmonary, gi and gu systems are negative, except as otherwise noted.      Objective    /88   Pulse 84   Temp 97.8  F (36.6  C) (Temporal)   Resp 18   Ht 1.6 m (5' 3\")   Wt 82.6 kg (182 lb 1.6 oz)   LMP 09/18/2023 (Exact Date)   SpO2 96%   BMI 32.26 kg/m    Body mass index is 32.26 kg/m .    Physical Exam   GENERAL: healthy, alert and no distress  EYES: Eyes grossly normal to inspection, PERRL and conjunctivae and sclerae normal  HENT: ear canals and TM's normal, nose and mouth without ulcers or lesions  NECK: no adenopathy, no asymmetry, masses, or scars and thyroid normal to palpation  RESP: diffuse wheezing with intermittent crackles to auscultation bilaterally in all lung fields  CV: regular rate and rhythm, normal S1 S2, no S3 or S4, no murmur, click or rub, no peripheral edema  MS: no gross musculoskeletal defects noted, no edema    Results for orders placed or performed during the hospital encounter of 09/27/23   XR Chest 2 Views     Status: None    Narrative    XR CHEST 2 VIEWS    HISTORY: 33 years Female cough x 6 days, history of asthma; Acute  cough    COMPARISON: None    TECHNIQUE: 2 views of the chest were obtained.    FINDINGS: Two views of the chest were obtained. Heart size and  pulmonary vascularity are within normal limits, lungs are clear on  both views. No consolidating air space opacities are present.          Impression    IMPRESSION: Clear chest.    ANITA PRINCE MD         SYSTEM ID:  [id number]               "

## 2023-10-02 ENCOUNTER — OFFICE VISIT (OUTPATIENT)
Dept: FAMILY MEDICINE | Facility: OTHER | Age: 33
End: 2023-10-02
Attending: NURSE PRACTITIONER
Payer: COMMERCIAL

## 2023-10-02 VITALS
SYSTOLIC BLOOD PRESSURE: 138 MMHG | OXYGEN SATURATION: 97 % | RESPIRATION RATE: 20 BRPM | BODY MASS INDEX: 32.4 KG/M2 | WEIGHT: 182.9 LBS | TEMPERATURE: 97.5 F | HEART RATE: 94 BPM | DIASTOLIC BLOOD PRESSURE: 86 MMHG

## 2023-10-02 DIAGNOSIS — J06.9 BACTERIAL URI: Primary | ICD-10-CM

## 2023-10-02 DIAGNOSIS — J45.41 MODERATE PERSISTENT ASTHMA WITH ACUTE EXACERBATION: ICD-10-CM

## 2023-10-02 DIAGNOSIS — T36.95XA ANTIBIOTIC-INDUCED YEAST INFECTION: ICD-10-CM

## 2023-10-02 DIAGNOSIS — B96.89 BACTERIAL URI: Primary | ICD-10-CM

## 2023-10-02 DIAGNOSIS — B37.9 ANTIBIOTIC-INDUCED YEAST INFECTION: ICD-10-CM

## 2023-10-02 PROCEDURE — 99214 OFFICE O/P EST MOD 30 MIN: CPT

## 2023-10-02 PROCEDURE — G0463 HOSPITAL OUTPT CLINIC VISIT: HCPCS

## 2023-10-02 RX ORDER — CEFUROXIME AXETIL 500 MG/1
500 TABLET ORAL 2 TIMES DAILY
Qty: 14 TABLET | Refills: 0 | Status: SHIPPED | OUTPATIENT
Start: 2023-10-02 | End: 2023-10-09

## 2023-10-02 RX ORDER — FLUCONAZOLE 150 MG/1
150 TABLET ORAL ONCE
Qty: 1 TABLET | Refills: 0 | Status: SHIPPED | OUTPATIENT
Start: 2023-10-02 | End: 2023-10-02

## 2023-10-02 ASSESSMENT — PATIENT HEALTH QUESTIONNAIRE - PHQ9: SUM OF ALL RESPONSES TO PHQ QUESTIONS 1-9: 0

## 2023-10-02 ASSESSMENT — PAIN SCALES - GENERAL: PAINLEVEL: NO PAIN (0)

## 2023-10-02 NOTE — PROGRESS NOTES
ASSESSMENT/PLAN:    I have reviewed the nursing notes.  I have reviewed the findings, diagnosis, plan and need for follow up with the patient.    1. Bacterial URI  2. Moderate persistent asthma with acute exacerbation  - cefuroxime (CEFTIN) 500 MG tablet; Take 1 tablet (500 mg) by mouth 2 times daily for 7 days  Dispense: 14 tablet; Refill: 0    Patient presents with persistent upper respiratory symptoms.  Patient has history of moderate persistent asthma.  Discussed with patient that her symptoms are consistent with a possible bacterial URI and an exacerbation of her moderate persistent asthma.  We will treat with Ceftin twice a day for 7 days as patient has an allergy to azithromycin and Augmentin and Ceftin has worked well for her in the past.  Discussed symptomatic treatment - Encouraged fluids, salt water gargles, honey, elevation, humidifier, sinus rinse/netti pot, lozenges, tea, topical vapor rub, popsicles, rest, etc. May use over-the-counter Tylenol or ibuprofen PRN.    3. Antibiotic-induced yeast infection  - fluconazole (DIFLUCAN) 150 MG tablet; Take 1 tablet (150 mg) by mouth once for 1 dose  Dispense: 1 tablet; Refill: 0    Patient has a history of antibiotic-induced yeast infections therefore provided patient with a single dose of Diflucan.    Discussed warning signs/symptoms indicative of need to f/u    Follow up if symptoms persist or worsen or concerns    I explained my diagnostic considerations and recommendations to the patient, who voiced understanding and agreement with the treatment plan. All questions were answered. We discussed potential side effects of any prescribed or recommended therapies, as well as expectations for response to treatments.    Antonio Buchanan, OLVIN CNP  10/2/2023  5:32 PM    HPI:    Ita Poole is a 33 year old female  who presents to [facility] today for concerns of URI symptoms    URI, x 10 days    Symptoms:  No fevers or chills.   No sore  throat/pharyngitis/tonsillitis.   YES: +  allergy/URI Symptoms  No muffled sounds/change in hearing  No sensation of fullness in ear(s)  No ringing in ears/tinnitus  No balance changes  No dizziness  YES: +  congestion (head/nasal/chest)  YES: +  cough/productive cough  YES: +  post nasal drip   No headache  No sinus pain/pressure  No myalgias  No otalgia  No rash  Activity Level Changes: No  Appetite/Liquid Intake Changes: Yes: decreased  Changes to Bowel Habits: Yes: diarrhea  Changes to Bladder Habits: No  Additional Symptoms to Report: Yes: shortness of breath, wheezing  History of similar symptoms: Yes  Prior workup: Yes: seen in  on 23 and treated for an asthma exacerbation, symptoms are still persisting    Treatments tried: Inhaler (name: albuterol), Nebulizer (name: albuterol), Fluids, and Rest    Site of exposure: home to son(s)  Type of exposure: not known    Other Pertinent History: asthma    Allergies: reviewed    PCP: Edgardo    Past Medical History:   Diagnosis Date    Allergic rhinitis     No Comments Provided    Celiac disease     No Comments Provided    Mass of right index finger at the PIP joint 2018    S/P excision of mass right index finger 2018    Uncomplicated asthma     No Comments Provided     Past Surgical History:   Procedure Laterality Date     SECTION      ,4/10/14, Section    COLONOSCOPY      age 10    ESOPHAGOSCOPY, GASTROSCOPY, DUODENOSCOPY (EGD), COMBINED      [id number],ESOPHAGOGASTRODUODENOSCOPY,age 10, small bowel bx: celiac disease    EXCISE MASS UPPER EXTREMITY Right 2018    Procedure: EXCISE MASS UPPER EXTREMITY;  Right Index Finger Mass Excision;  Surgeon: Nate Allen DO;  Location:  OR    LAPAROSCOPIC CHOLECYSTECTOMY      9/22/15,Cholecystectomy,Laparoscopic    S/P EXCISION OF MASS RIGHT INDEX FINGER Right 2018     Social History     Tobacco Use    Smoking status: Never     Passive exposure: Current    Smokeless tobacco:  Never    Tobacco comments:     Quit smoking: passive exposure   Substance Use Topics    Alcohol use: Yes     Comment: social - one per month     Current Outpatient Medications   Medication Sig Dispense Refill    albuterol (PROAIR HFA/PROVENTIL HFA/VENTOLIN HFA) 108 (90 Base) MCG/ACT inhaler Inhale 2 puffs into the lungs every 4 hours as needed for wheezing 18 g 11    buPROPion (WELLBUTRIN XL) 300 MG 24 hr tablet Take 300 mg by mouth every morning      cyclobenzaprine (FLEXERIL) 10 MG tablet TAKE 1 TABLET BY MOUTH 3 TIMES DAILY IF NEEDED FOR MUSCLE SPASM. 30 tablet 4    EPINEPHrine (ANY BX GENERIC EQUIV) 0.3 MG/0.3ML injection 2-pack Inject 0.3 mLs (0.3 mg) into the muscle as needed for anaphylaxis 2 each 11    fluticasone (FLOVENT HFA) 110 MCG/ACT inhaler Inhale 1 puff into the lungs 2 times daily 12 g 0    ibuprofen (ADVIL/MOTRIN) 800 MG tablet TAKE 1 TABLET BY MOUTH 3 TIMES DAILY IF NEEDED FOR PAIN. 180 tablet 4    LORazepam (ATIVAN) 0.5 MG tablet TAKE 1 TABLET (0.5 MG) BY MOUTH 3 TIMES WEEKLY AS NEEDED FOR PANIC ATTACKS MUST LAST 30 DAYS      LORazepam (ATIVAN) 1 MG tablet Take 1 mg by mouth every 6 hours as needed for anxiety      medical cannabis (Patient's own supply) See Admin Instructions (The purpose of this order is to document that the patient reports taking medical cannabis.  This is not a prescription, and is not used to certify that the patient has a qualifying medical condition.)      MONO-LINYAH 0.25-35 MG-MCG tablet TAKE 1 TABLET BY MOUTH EVERY DAY *DUE FOR ANNUAL VISIT WITH PCP FOR FUTURE REFILLS* 84 tablet 11    predniSONE (DELTASONE) 10 MG tablet Take 4 tablets (40 mg) by mouth daily for 5 days 30 tablet 0    risperiDONE (RISPERDAL) 2 MG tablet TAKE 1 TABLET (2MG) BY MOUTH IN THE MORNING. TAKE 1/2 TABLET (1MG) IN THE EVENING AS NEEDED.      zolpidem ER (AMBIEN CR) 12.5 MG CR tablet TAKE 1 TABLET (12.5MG) BY MOUTH AT BEDTIME      benzonatate (TESSALON) 100 MG capsule Take 1 capsule (100 mg) by  mouth 3 times daily as needed for cough (Patient not taking: Reported on 10/2/2023) 30 capsule 0     Allergies   Allergen Reactions    Amoxicillin-Pot Clavulanate Diarrhea     Yeast Infection in colon    Amoxicillin-Pot Clavulanate Diarrhea     Cannot have due to wheat    Azithromycin Nausea    Azithromycin      Can't take because it contains wheat    Codeine      constipation    Gluten     Gluten Meal      Other reaction(s): Constipation  Celiac disease, abdominal pain    Food Diarrhea     Other reaction(s): Constipation  Wheat, rye, barley     Past medical history, past surgical history, current medications and allergies reviewed and accurate to the best of my knowledge.      ROS:  Refer to HPI    /86   Pulse 94   Temp 97.5  F (36.4  C) (Temporal)   Resp 20   Wt 83 kg (182 lb 14.4 oz)   LMP 09/18/2023 (Exact Date)   SpO2 97%   Breastfeeding No   BMI 32.40 kg/m      EXAM:  General Appearance: Well appearing 33 year old female, appropriate appearance for age. No acute distress   Ears: Left TM intact, translucent with bony landmarks appreciated, no erythema, no effusion, no bulging, no purulence.  Right TM intact, translucent with bony landmarks appreciated, no erythema, no effusion, no bulging, no purulence.  Left auditory canal clear.  Right auditory canal clear.  Normal external ears, non tender.  Eyes: conjunctivae normal without erythema or irritation, corneas clear, no drainage or crusting, no eyelid swelling, pupils equal   Oropharynx: moist mucous membranes, posterior pharynx without erythema, tonsils symmetric and 1+, no erythema, no exudates or petechiae, no post nasal drip seen, no trismus, voice clear.    Sinuses:  No sinus tenderness upon palpation of the frontal or maxillary sinuses  Nose:  Bilateral nares: no erythema, no edema, no drainage or congestion   Neck: supple with lateral tonsillar adenopathy  Respiratory: normal chest wall and respirations.  Normal effort.  Expiratory wheezing  throughout lung fields, no crackles or rhonchi.  No increased work of breathing.  Moderate cough appreciated.  Cardiac: RRR with no murmurs  Musculoskeletal:  Equal movement of bilateral upper extremities.  Equal movement of bilateral lower extremities.  Normal gait.    Dermatological: no rashes noted of exposed skin  Neuro: Alert and oriented to person, place, and time.    Psychological: normal affect, alert, oriented, and pleasant.

## 2023-10-02 NOTE — NURSING NOTE
"Chief Complaint   Patient presents with    RECHECK     Cough not any better   She is following up in [facility] as her cough is not any btter.  Marilu Jacobs LPN..................10/2/2023   5:26 PM      Initial /86   Pulse 94   Temp 97.5  F (36.4  C) (Temporal)   Resp 20   Wt 83 kg (182 lb 14.4 oz)   LMP 09/18/2023 (Exact Date)   SpO2 97%   Breastfeeding No   BMI 32.40 kg/m   Estimated body mass index is 32.4 kg/m  as calculated from the following:    Height as of 9/27/23: 1.6 m (5' 3\").    Weight as of this encounter: 83 kg (182 lb 14.4 oz).  Medication Reconciliation: complete      Advance care directive on file? no  Advance care directive provided to patient? declined     Marilu Jacobs, SHANICE    "

## 2023-10-06 DIAGNOSIS — S30.0XXA CONTUSION, BUTTOCK, INITIAL ENCOUNTER: ICD-10-CM

## 2023-10-06 DIAGNOSIS — M79.18 RIGHT BUTTOCK PAIN: ICD-10-CM

## 2023-10-13 RX ORDER — IBUPROFEN 800 MG/1
TABLET, FILM COATED ORAL
Qty: 180 TABLET | Refills: 0 | Status: SHIPPED | OUTPATIENT
Start: 2023-10-13

## 2023-10-13 NOTE — TELEPHONE ENCOUNTER
[facility] sent Rx request for the following:      Requested Prescriptions   Pending Prescriptions Disp Refills    ibuprofen (ADVIL/MOTRIN) 800 MG tablet [Pharmacy Med Name: IBUPROFEN 800MG TABLET] 180 tablet 4     Sig: TAKE 1 TABLET BY MOUTH 3 TIMES DAILY IF NEEDED FOR PAIN.       NSAID Medications Failed - 10/6/2023 10:58 AM        Failed - Normal ALT on file in past 12 months     Recent Labs   Lab Test 07/06/22  1359 05/08/19  1054 10/18/16  1832   ALT 12   < >  --    GICHALT  --   --  12    < > = values in this interval not displayed.             Failed - Normal AST on file in past 12 months     Recent Labs   Lab Test 07/06/22  1359 05/08/19  1054 10/18/16  1832   AST 16   < >  --    GICHAST  --   --  14    < > = values in this interval not displayed.             Failed - Normal CBC on file in past 12 months     Recent Labs   Lab Test 07/06/22  1359 02/27/18  1630 10/18/16  1815   WBC 7.4   < >  --    GICHWBC  --   --  10.0   RBC 4.45   < >  --    GICHRBC  --   --  4.37   HGB 13.8   < > 13.3   HCT 39.5   < > 38.6      < > 305    < > = values in this interval not displayed.                 Failed - Normal serum creatinine on file in past 12 months     Recent Labs   Lab Test 07/06/22  1359   CR 0.87       Ok to refill medication if creatinine is low         Last Prescription Date:   7/11/22  Last Fill Qty/Refills:         180, R-4    Last Office Visit:              8/22/22   Future Office visit:           10/31/23    Prescription approved per [facility] Refill Protocol.  Dalila Townsend RN on 10/13/2023 at 10:46 AM

## 2023-10-31 ENCOUNTER — OFFICE VISIT (OUTPATIENT)
Dept: FAMILY MEDICINE | Facility: OTHER | Age: 33
End: 2023-10-31
Attending: FAMILY MEDICINE
Payer: COMMERCIAL

## 2023-10-31 VITALS
BODY MASS INDEX: 32.25 KG/M2 | TEMPERATURE: 97.6 F | SYSTOLIC BLOOD PRESSURE: 128 MMHG | WEIGHT: 182 LBS | OXYGEN SATURATION: 98 % | HEIGHT: 63 IN | RESPIRATION RATE: 16 BRPM | HEART RATE: 94 BPM | DIASTOLIC BLOOD PRESSURE: 62 MMHG

## 2023-10-31 DIAGNOSIS — Z13.0 SCREENING FOR DEFICIENCY ANEMIA: ICD-10-CM

## 2023-10-31 DIAGNOSIS — Z13.29 SCREENING FOR HYPOTHYROIDISM: ICD-10-CM

## 2023-10-31 DIAGNOSIS — Z00.00 VISIT FOR PREVENTIVE HEALTH EXAMINATION: Primary | ICD-10-CM

## 2023-10-31 DIAGNOSIS — J45.40 MODERATE PERSISTENT ASTHMA WITHOUT COMPLICATION: ICD-10-CM

## 2023-10-31 DIAGNOSIS — Z91.038 HYMENOPTERA ALLERGY: ICD-10-CM

## 2023-10-31 DIAGNOSIS — Z13.228 SCREENING FOR METABOLIC DISORDER: ICD-10-CM

## 2023-10-31 DIAGNOSIS — Z13.1 SCREENING FOR DIABETES MELLITUS: ICD-10-CM

## 2023-10-31 DIAGNOSIS — Z13.220 SCREENING FOR HYPERLIPIDEMIA: ICD-10-CM

## 2023-10-31 LAB
ALBUMIN SERPL BCG-MCNC: 4.1 G/DL (ref 3.5–5.2)
ALP SERPL-CCNC: 44 U/L (ref 35–104)
ALT SERPL W P-5'-P-CCNC: 20 U/L (ref 0–50)
ANION GAP SERPL CALCULATED.3IONS-SCNC: 8 MMOL/L (ref 7–15)
AST SERPL W P-5'-P-CCNC: 21 U/L (ref 0–45)
BASOPHILS # BLD AUTO: 0.1 10E3/UL (ref 0–0.2)
BASOPHILS NFR BLD AUTO: 1 %
BILIRUB SERPL-MCNC: 0.2 MG/DL
BUN SERPL-MCNC: 9.9 MG/DL (ref 6–20)
CALCIUM SERPL-MCNC: 9 MG/DL (ref 8.6–10)
CHLORIDE SERPL-SCNC: 110 MMOL/L (ref 98–107)
CHOLEST SERPL-MCNC: 165 MG/DL
CREAT SERPL-MCNC: 1 MG/DL (ref 0.51–0.95)
DEPRECATED HCO3 PLAS-SCNC: 23 MMOL/L (ref 22–29)
EGFRCR SERPLBLD CKD-EPI 2021: 76 ML/MIN/1.73M2
EOSINOPHIL # BLD AUTO: 0.2 10E3/UL (ref 0–0.7)
EOSINOPHIL NFR BLD AUTO: 2 %
ERYTHROCYTE [DISTWIDTH] IN BLOOD BY AUTOMATED COUNT: 11.4 % (ref 10–15)
GLUCOSE SERPL-MCNC: 102 MG/DL (ref 70–99)
HBA1C MFR BLD: 4.9 % (ref 4–6.2)
HCT VFR BLD AUTO: 39.3 % (ref 35–47)
HDLC SERPL-MCNC: 52 MG/DL
HGB BLD-MCNC: 13.4 G/DL (ref 11.7–15.7)
IMM GRANULOCYTES # BLD: 0 10E3/UL
IMM GRANULOCYTES NFR BLD: 0 %
LDLC SERPL CALC-MCNC: 91 MG/DL
LYMPHOCYTES # BLD AUTO: 2 10E3/UL (ref 0.8–5.3)
LYMPHOCYTES NFR BLD AUTO: 22 %
MCH RBC QN AUTO: 31.1 PG (ref 26.5–33)
MCHC RBC AUTO-ENTMCNC: 34.1 G/DL (ref 31.5–36.5)
MCV RBC AUTO: 91 FL (ref 78–100)
MONOCYTES # BLD AUTO: 0.5 10E3/UL (ref 0–1.3)
MONOCYTES NFR BLD AUTO: 6 %
NEUTROPHILS # BLD AUTO: 6.3 10E3/UL (ref 1.6–8.3)
NEUTROPHILS NFR BLD AUTO: 69 %
NONHDLC SERPL-MCNC: 113 MG/DL
NRBC # BLD AUTO: 0 10E3/UL
NRBC BLD AUTO-RTO: 0 /100
PLATELET # BLD AUTO: 258 10E3/UL (ref 150–450)
POTASSIUM SERPL-SCNC: 4.1 MMOL/L (ref 3.4–5.3)
PROT SERPL-MCNC: 6.8 G/DL (ref 6.4–8.3)
RBC # BLD AUTO: 4.31 10E6/UL (ref 3.8–5.2)
SODIUM SERPL-SCNC: 141 MMOL/L (ref 135–145)
TRIGL SERPL-MCNC: 110 MG/DL
TSH SERPL DL<=0.005 MIU/L-ACNC: 1.83 UIU/ML (ref 0.3–4.2)
WBC # BLD AUTO: 9 10E3/UL (ref 4–11)

## 2023-10-31 PROCEDURE — 36415 COLL VENOUS BLD VENIPUNCTURE: CPT | Mod: ZL | Performed by: FAMILY MEDICINE

## 2023-10-31 PROCEDURE — 91320 SARSCV2 VAC 30MCG TRS-SUC IM: CPT

## 2023-10-31 PROCEDURE — 84443 ASSAY THYROID STIM HORMONE: CPT | Mod: ZL | Performed by: FAMILY MEDICINE

## 2023-10-31 PROCEDURE — 80061 LIPID PANEL: CPT | Mod: ZL | Performed by: FAMILY MEDICINE

## 2023-10-31 PROCEDURE — 83036 HEMOGLOBIN GLYCOSYLATED A1C: CPT | Mod: ZL | Performed by: FAMILY MEDICINE

## 2023-10-31 PROCEDURE — 85004 AUTOMATED DIFF WBC COUNT: CPT | Mod: ZL | Performed by: FAMILY MEDICINE

## 2023-10-31 PROCEDURE — 99395 PREV VISIT EST AGE 18-39: CPT | Performed by: FAMILY MEDICINE

## 2023-10-31 PROCEDURE — 80053 COMPREHEN METABOLIC PANEL: CPT | Mod: ZL | Performed by: FAMILY MEDICINE

## 2023-10-31 RX ORDER — EPINEPHRINE 0.3 MG/.3ML
0.3 INJECTION SUBCUTANEOUS PRN
Qty: 2 EACH | Refills: 11 | Status: SHIPPED | OUTPATIENT
Start: 2023-10-31

## 2023-10-31 RX ORDER — FLUTICASONE PROPIONATE 110 UG/1
1 AEROSOL, METERED RESPIRATORY (INHALATION) 2 TIMES DAILY
Qty: 12 G | Refills: 11 | Status: SHIPPED | OUTPATIENT
Start: 2023-10-31

## 2023-10-31 ASSESSMENT — PAIN SCALES - GENERAL: PAINLEVEL: NO PAIN (0)

## 2023-10-31 ASSESSMENT — ENCOUNTER SYMPTOMS
EYE PAIN: 0
FREQUENCY: 0
FEVER: 0
HEARTBURN: 0
CHILLS: 0
ARTHRALGIAS: 0
COUGH: 0
DIZZINESS: 0
JOINT SWELLING: 0
HEMATOCHEZIA: 0
HEMATURIA: 0
SHORTNESS OF BREATH: 0
DIARRHEA: 0
DYSURIA: 0
CONSTIPATION: 0
ABDOMINAL PAIN: 0
BREAST MASS: 0
HEADACHES: 0
SORE THROAT: 0
MYALGIAS: 0
PARESTHESIAS: 0
WEAKNESS: 0
NAUSEA: 0
NERVOUS/ANXIOUS: 0
PALPITATIONS: 0

## 2023-10-31 ASSESSMENT — ASTHMA QUESTIONNAIRES
QUESTION_2 LAST FOUR WEEKS HOW OFTEN HAVE YOU HAD SHORTNESS OF BREATH: NOT AT ALL
QUESTION_3 LAST FOUR WEEKS HOW OFTEN DID YOUR ASTHMA SYMPTOMS (WHEEZING, COUGHING, SHORTNESS OF BREATH, CHEST TIGHTNESS OR PAIN) WAKE YOU UP AT NIGHT OR EARLIER THAN USUAL IN THE MORNING: NOT AT ALL
ACT_TOTALSCORE: 25
QUESTION_5 LAST FOUR WEEKS HOW WOULD YOU RATE YOUR ASTHMA CONTROL: COMPLETELY CONTROLLED
QUESTION_4 LAST FOUR WEEKS HOW OFTEN HAVE YOU USED YOUR RESCUE INHALER OR NEBULIZER MEDICATION (SUCH AS ALBUTEROL): NOT AT ALL
ACT_TOTALSCORE: 25
QUESTION_1 LAST FOUR WEEKS HOW MUCH OF THE TIME DID YOUR ASTHMA KEEP YOU FROM GETTING AS MUCH DONE AT WORK, SCHOOL OR AT HOME: NONE OF THE TIME

## 2023-10-31 ASSESSMENT — PATIENT HEALTH QUESTIONNAIRE - PHQ9
10. IF YOU CHECKED OFF ANY PROBLEMS, HOW DIFFICULT HAVE THESE PROBLEMS MADE IT FOR YOU TO DO YOUR WORK, TAKE CARE OF THINGS AT HOME, OR GET ALONG WITH OTHER PEOPLE: NOT DIFFICULT AT ALL
SUM OF ALL RESPONSES TO PHQ QUESTIONS 1-9: 0
SUM OF ALL RESPONSES TO PHQ QUESTIONS 1-9: 0

## 2023-10-31 NOTE — NURSING NOTE
"Chief Complaint   Patient presents with    Physical       Initial /62   Pulse 94   Temp 97.6  F (36.4  C) (Temporal)   Resp 16   Ht 1.6 m (5' 3\")   Wt 82.6 kg (182 lb)   LMP 09/18/2023 (Exact Date)   SpO2 98%   Breastfeeding No   BMI 32.24 kg/m   Estimated body mass index is 32.24 kg/m  as calculated from the following:    Height as of this encounter: 1.6 m (5' 3\").    Weight as of this encounter: 82.6 kg (182 lb).  Medication Review: complete    The next two questions are to help us understand your food security.  If you are feeling you need any assistance in this area, we have resources available to support you today.          10/31/2023   SDOH- Food Insecurity   Within the past 12 months, did you worry that your food would run out before you got money to buy more? N   Within the past 12 months, did the food you bought just not last and you didn t have money to get more? N         Health Care Directive:  Patient does not have a Health Care Directive or Living Will: Discussed advance care planning with patient; however, patient declined at this time.    Marilu Jacobs LPN      "

## 2023-10-31 NOTE — PROGRESS NOTES
SUBJECTIVE:   CC: Ita is an 33 year old who presents for preventive health visit.       10/31/2023     8:34 AM   Additional Questions   Roomed by Marilu Jacobs LPN       Healthy Habits:     Getting at least 3 servings of Calcium per day:  Yes    Bi-annual eye exam:  Yes    Dental care twice a year:  Yes    Sleep apnea or symptoms of sleep apnea:  None    Diet:  Gluten-free/reduced    Frequency of exercise:  4-5 days/week    Duration of exercise:  Other    Taking medications regularly:  Yes    Medication side effects:  Not applicable    Additional concerns today:  Yes      Today's PHQ-9 Score:       10/31/2023     8:15 AM   PHQ-9 SCORE   PHQ-9 Total Score MyChart 0   PHQ-9 Total Score 0                   Have you ever done Advance Care Planning? (For example, a Health Directive, POLST, or a discussion with a medical provider or your loved ones about your wishes): No, advance care planning information given to patient to review.  Patient declined advance care planning discussion at this time.    Social History     Tobacco Use    Smoking status: Never     Passive exposure: Current    Smokeless tobacco: Never    Tobacco comments:     Quit smoking: passive exposure   Substance Use Topics    Alcohol use: Yes     Comment: social - one per month             10/31/2023     8:17 AM   Alcohol Use   Prescreen: >3 drinks/day or >7 drinks/week? No     Reviewed orders with patient.  Reviewed health maintenance and updated orders accordingly - Yes  Labs reviewed in EPIC    Breast Cancer Screening:        10/31/2023     8:18 AM   Breast CA Risk Assessment (FHS-7)   Do you have a family history of breast, colon, or ovarian cancer? No / Unknown           Pertinent mammograms are reviewed under the imaging tab.    History of abnormal Pap smear: NO - age 30-65 PAP every 5 years with negative HPV co-testing recommended      Latest Ref Rng & Units 8/22/2022     4:34 PM   PAP / HPV   PAP  Negative for Intraepithelial Lesion or  "Malignancy (NILM)    HPV 16 DNA Negative Negative    HPV 18 DNA Negative Negative    Other HR HPV Negative Negative      Reviewed and updated as needed this visit by clinical staff   Tobacco  Allergies    Med Hx  Surg Hx  Fam Hx          Reviewed and updated as needed this visit by Provider                     Review of Systems   Constitutional:  Negative for chills and fever.   HENT:  Negative for congestion, ear pain, hearing loss and sore throat.    Eyes:  Negative for pain and visual disturbance.   Respiratory:  Negative for cough and shortness of breath.    Cardiovascular:  Negative for chest pain, palpitations and peripheral edema.   Gastrointestinal:  Negative for abdominal pain, constipation, diarrhea, heartburn, hematochezia and nausea.   Breasts:  Negative for tenderness, breast mass and discharge.   Genitourinary:  Negative for dysuria, frequency, genital sores, hematuria, pelvic pain, urgency, vaginal bleeding and vaginal discharge.   Musculoskeletal:  Negative for arthralgias, joint swelling and myalgias.   Skin:  Negative for rash.   Neurological:  Negative for dizziness, weakness, headaches and paresthesias.   Psychiatric/Behavioral:  Negative for mood changes. The patient is not nervous/anxious.           OBJECTIVE:   /62   Pulse 94   Temp 97.6  F (36.4  C) (Temporal)   Resp 16   Ht 1.6 m (5' 3\")   Wt 82.6 kg (182 lb)   LMP 09/18/2023 (Exact Date)   SpO2 98%   Breastfeeding No   BMI 32.24 kg/m    Physical Exam          ASSESSMENT/PLAN:   Ita was seen today for physical.    Diagnoses and all orders for this visit:    Visit for preventive health examination    Screening for deficiency anemia  -     CBC with Platelets & Differential; Future  -     CBC with Platelets & Differential    Screening for metabolic disorder  -     Comprehensive metabolic panel; Future  -     Comprehensive metabolic panel    Screening for hyperlipidemia  -     Lipid Profile; Future  -     Lipid " "Profile    Screening for diabetes mellitus  -     Hemoglobin A1c; Future  -     Hemoglobin A1c    Screening for hypothyroidism  -     TSH with free T4 reflex; Future  -     TSH with free T4 reflex    Hymenoptera allergy  -     EPINEPHrine (ANY BX GENERIC EQUIV) 0.3 MG/0.3ML injection 2-pack; Inject 0.3 mLs (0.3 mg) into the muscle as needed for anaphylaxis    Moderate persistent asthma without complication  -     fluticasone (FLOVENT HFA) 110 MCG/ACT inhaler; Inhale 1 puff into the lungs 2 times daily    Other orders  -     COVID-19 12+ (2023-24) (PFIZER)              COUNSELING:  Reviewed preventive health counseling, as reflected in patient instructions       Regular exercise       Healthy diet/nutrition      BMI:   Estimated body mass index is 32.24 kg/m  as calculated from the following:    Height as of this encounter: 1.6 m (5' 3\").    Weight as of this encounter: 82.6 kg (182 lb).   Weight management plan: Discussed healthy diet and exercise guidelines      She reports that she has never smoked. She has been exposed to tobacco smoke. She has never used smokeless tobacco.          Will Reynoso MD  [facility]  "

## 2023-10-31 NOTE — PROGRESS NOTES
"Nursing Notes:   Marilu Jacobs LPN  10/31/2023  8:42 AM  Sign at exiting of workspace  Chief Complaint   Patient presents with    Physical       Initial /62   Pulse 94   Temp 97.6  F (36.4  C) (Temporal)   Resp 16   Ht 1.6 m (5' 3\")   Wt 82.6 kg (182 lb)   LMP 09/18/2023 (Exact Date)   SpO2 98%   Breastfeeding No   BMI 32.24 kg/m   Estimated body mass index is 32.24 kg/m  as calculated from the following:    Height as of this encounter: 1.6 m (5' 3\").    Weight as of this encounter: 82.6 kg (182 lb).  Medication Review: complete    The next two questions are to help us understand your food security.  If you are feeling you need any assistance in this area, we have resources available to support you today.          10/31/2023   SDOH- Food Insecurity   Within the past 12 months, did you worry that your food would run out before you got money to buy more? N   Within the past 12 months, did the food you bought just not last and you didn t have money to get more? N         Health Care Directive:  Patient does not have a Health Care Directive or Living Will: Discussed advance care planning with patient; however, patient declined at this time.    Marilu Jacobs LPN      SUBJECTIVE:  Ita Poole  is a 33 year old female who comes in for a physical.  She is up-to-date on Pap smear.  She needs to get her flu shot.  She is up-to-date on Boostrix until February so might want to consider that.  She has not had the new COVID-19 booster.  She has not had Pneumovax.  She does have asthma that is mom mild persistent.  She continues on OCPs.    She is followed by psychiatry for her mental health issues and continues on low-dose Risperdal, occasional lorazepam for panic attacks, Ambien at at bedtime for sleep and 300 mg of Wellbutrin daily.    She was seen a couple weeks ago for URI and felt to have had a sinus infection for which she was treated with Ceftin.    She had a breast lump noted in June and had " diagnostic mammogram and left breast ultrasound that was felt to be benign.    She has been having trouble with her fingers. She had burning of the ends of her fingers when she took a hot shower.  She did an ice pack and it helped. It happened once and not again.     Past Medical, Family, and Social History reviewed and updated as noted below.   ROS is negative except as noted above       Allergies   Allergen Reactions    Amoxicillin-Pot Clavulanate Diarrhea     Yeast Infection in colon    Amoxicillin-Pot Clavulanate Diarrhea     Cannot have due to wheat    Azithromycin Nausea    Azithromycin      Can't take because it contains wheat    Codeine      constipation    Gluten     Gluten Meal      Other reaction(s): Constipation  Celiac disease, abdominal pain    Food Diarrhea     Other reaction(s): Constipation  Wheat, rye, barley   ,   Family History   Problem Relation Age of Onset    Asthma Mother         Asthma    Allergy (Severe) Mother         Allergies    Asthma Father         Asthma    Heart Disease Father 43        Heart Disease, of MI    Diabetes Father         Diabetes    Asthma Maternal Grandmother         Asthma    Diabetes Paternal Grandfather         Diabetes   ,   Current Outpatient Medications   Medication    albuterol (PROAIR HFA/PROVENTIL HFA/VENTOLIN HFA) 108 (90 Base) MCG/ACT inhaler    buPROPion (WELLBUTRIN XL) 300 MG 24 hr tablet    cyclobenzaprine (FLEXERIL) 10 MG tablet    EPINEPHrine (ANY BX GENERIC EQUIV) 0.3 MG/0.3ML injection 2-pack    fluticasone (FLOVENT HFA) 110 MCG/ACT inhaler    ibuprofen (ADVIL/MOTRIN) 800 MG tablet    LORazepam (ATIVAN) 0.5 MG tablet    LORazepam (ATIVAN) 1 MG tablet    medical cannabis (Patient's own supply)    MONO-LINYAH 0.25-35 MG-MCG tablet    risperiDONE (RISPERDAL) 2 MG tablet    zolpidem ER (AMBIEN CR) 12.5 MG CR tablet     No current facility-administered medications for this visit.   ,   Past Medical History:   Diagnosis Date    Allergic rhinitis     No  Comments Provided    Celiac disease     No Comments Provided    Mass of right index finger at the PIP joint 2018    S/P excision of mass right index finger 2018    Uncomplicated asthma     No Comments Provided   ,   Patient Active Problem List    Diagnosis Date Noted    History of bilateral breast reduction surgery 2023     Priority: Medium    S/P excision of mass right index finger 2018     Priority: Medium    Cold thyroid nodule 2017     Priority: Medium    Morbid obesity (H) 2017     Priority: Medium    Irregular menses 2016     Priority: Medium    H/O  section 2014     Priority: Medium    Major depressive disorder, recurrent episode, moderate (H) 2013     Priority: Medium     Overview:   Dx added per office visit on 12 with Dr. Minnie Baltazar RN, Clinical , [facility], 2013 5:02 PM    Formatting of this note might be different from the original.  Dx added per office visit on 12 with Dr. Minnie Baltazar RN, Clinical , [facility], 2013 5:02 PM      Vitamin D deficiency 2007     Priority: Medium     Problem list name updated by automated process. Provider to review      Angioedema 2006     Priority: Medium     Problem list name updated by automated process. Provider to review      Celiac disease 2006     Priority: Medium    Abdominal pain, generalized 2006     Priority: Medium     C.diff, admitted for obs. X 24 hrs. Flagyl started as outpt. Laura Moffett MD        Depressive disorder, not elsewhere classified 10/28/2005     Priority: Medium     Followed at [facility], meds by Marnie Marsh RN      Moderate persistent asthma 2005     Priority: Medium     aap completed        Allergic rhinitis 2005     Priority: Medium     Problem list name updated by automated process. Provider  "to review      Migraine 2005     Priority: Medium     Problem list name updated by automated process. Provider to review     ,   Past Surgical History:   Procedure Laterality Date     SECTION      ,4/10/14, Section    COLONOSCOPY      age 10    ESOPHAGOSCOPY, GASTROSCOPY, DUODENOSCOPY (EGD), COMBINED      [id number],ESOPHAGOGASTRODUODENOSCOPY,age 10, small bowel bx: celiac disease    EXCISE MASS UPPER EXTREMITY Right 2018    Procedure: EXCISE MASS UPPER EXTREMITY;  Right Index Finger Mass Excision;  Surgeon: Nate Allen DO;  Location: [facility] OR    LAPAROSCOPIC CHOLECYSTECTOMY      9/22/15,Cholecystectomy,Laparoscopic    S/P EXCISION OF MASS RIGHT INDEX FINGER Right 2018    and   Social History     Tobacco Use    Smoking status: Never     Passive exposure: Current    Smokeless tobacco: Never    Tobacco comments:     Quit smoking: passive exposure   Substance Use Topics    Alcohol use: Yes     Comment: social - one per month     OBJECTIVE:  /62   Pulse 94   Temp 97.6  F (36.4  C) (Temporal)   Resp 16   Ht 1.6 m (5' 3\")   Wt 82.6 kg (182 lb)   LMP 2023 (Exact Date)   SpO2 98%   Breastfeeding No   BMI 32.24 kg/m     EXAM:  General Appearance: Pleasant, alert, appropriate appearance for age. No acute distress  Head Exam: Normal. Normocephalic, atraumatic.  Eye Exam: PERRLA, EOMI, conjunctivae, sclerae normal.  Ear Exam: Normal TM's bilaterally. Normal auditory canals and external ears. Non-tender.  Nose Exam: Normal external nose, mucus membranes, and septum.  OroPharynx Exam:  Dental hygiene adequate. Normal buccal mucosa. Normal pharynx.  Neck Exam:  Supple, no masses or nodes. No bruits  Thyroid Exam: No nodules or enlargement.  Chest/Respiratory Exam: Normal chest wall and respirations. Clear to auscultation.  Breast Exam: No dimpling, nipple retraction or discharge. No masses or nodes.  Cardiovascular Exam: Regular rate and rhythm. S1, S2, no murmur, click, " gallop, or rubs.  Gastrointestinal Exam: Soft, non-tender, no masses or organomegaly.  Lymphatic Exam: Non-palpable nodes in neck,clavicular, axillary, or inguinal regions.  Musculoskeletal Exam: Back is straight and non-tender, full ROM of upper and lower extremities.  Foot Exam: Left and right foot: good pedal pulses  Skin: no rash or abnormalities  Neurologic Exam:  normal gross motor, tone coordination and no tremor.  Psychiatric Exam: Alert and oriented - appropriate affect.    Results for orders placed or performed in visit on 10/31/23   Hemoglobin A1c     Status: Normal   Result Value Ref Range    Hemoglobin A1C 4.9 4.0 - 6.2 %   TSH with free T4 reflex     Status: Normal   Result Value Ref Range    TSH 1.83 0.30 - 4.20 uIU/mL   Lipid Profile     Status: Normal   Result Value Ref Range    Cholesterol 165 <200 mg/dL    Triglycerides 110 <150 mg/dL    Direct Measure HDL 52 >=50 mg/dL    LDL Cholesterol Calculated 91 <=100 mg/dL    Non HDL Cholesterol 113 <130 mg/dL    Narrative    Cholesterol  Desirable:  <200 mg/dL    Triglycerides  Normal:  Less than 150 mg/dL  Borderline High:  150-199 mg/dL  High:  200-499 mg/dL  Very High:  Greater than or equal to 500 mg/dL    Direct Measure HDL  Female:  Greater than or equal to 50 mg/dL   Male:  Greater than or equal to 40 mg/dL    LDL Cholesterol  Desirable:  <100mg/dL  Above Desirable:  100-129 mg/dL   Borderline High:  130-159 mg/dL   High:  160-189 mg/dL   Very High:  >= 190 mg/dL    Non HDL Cholesterol  Desirable:  130 mg/dL  Above Desirable:  130-159 mg/dL  Borderline High:  160-189 mg/dL  High:  190-219 mg/dL  Very High:  Greater than or equal to 220 mg/dL   Comprehensive metabolic panel     Status: Abnormal   Result Value Ref Range    Sodium 141 135 - 145 mmol/L    Potassium 4.1 3.4 - 5.3 mmol/L    Carbon Dioxide (CO2) 23 22 - 29 mmol/L    Anion Gap 8 7 - 15 mmol/L    Urea Nitrogen 9.9 6.0 - 20.0 mg/dL    Creatinine 1.00 (H) 0.51 - 0.95 mg/dL    GFR Estimate 76  >60 mL/min/1.73m2    Calcium 9.0 8.6 - 10.0 mg/dL    Chloride 110 (H) 98 - 107 mmol/L    Glucose 102 (H) 70 - 99 mg/dL    Alkaline Phosphatase 44 35 - 104 U/L    AST 21 0 - 45 U/L    ALT 20 0 - 50 U/L    Protein Total 6.8 6.4 - 8.3 g/dL    Albumin 4.1 3.5 - 5.2 g/dL    Bilirubin Total 0.2 <=1.2 mg/dL   CBC with platelets and differential     Status: None   Result Value Ref Range    WBC Count 9.0 4.0 - 11.0 10e3/uL    RBC Count 4.31 3.80 - 5.20 10e6/uL    Hemoglobin 13.4 11.7 - 15.7 g/dL    Hematocrit 39.3 35.0 - 47.0 %    MCV 91 78 - 100 fL    MCH 31.1 26.5 - 33.0 pg    MCHC 34.1 31.5 - 36.5 g/dL    RDW 11.4 10.0 - 15.0 %    Platelet Count 258 150 - 450 10e3/uL    % Neutrophils 69 %    % Lymphocytes 22 %    % Monocytes 6 %    % Eosinophils 2 %    % Basophils 1 %    % Immature Granulocytes 0 %    NRBCs per 100 WBC 0 <1 /100    Absolute Neutrophils 6.3 1.6 - 8.3 10e3/uL    Absolute Lymphocytes 2.0 0.8 - 5.3 10e3/uL    Absolute Monocytes 0.5 0.0 - 1.3 10e3/uL    Absolute Eosinophils 0.2 0.0 - 0.7 10e3/uL    Absolute Basophils 0.1 0.0 - 0.2 10e3/uL    Absolute Immature Granulocytes 0.0 <=0.4 10e3/uL    Absolute NRBCs 0.0 10e3/uL   CBC with Platelets & Differential     Status: None    Narrative    The following orders were created for panel order CBC with Platelets & Differential.  Procedure                               Abnormality         Status                     ---------                               -----------         ------                     CBC with platelets and d...[[id number]]                      Final result                 Please view results for these tests on the individual orders.      ASSESSMENT/Plan :    Ita was seen today for physical.    Diagnoses and all orders for this visit:    Visit for preventive health examination    Screening for deficiency anemia  -     CBC with Platelets & Differential; Future  -     CBC with Platelets & Differential    Screening for metabolic disorder  -      Comprehensive metabolic panel; Future  -     Comprehensive metabolic panel    Screening for hyperlipidemia  -     Lipid Profile; Future  -     Lipid Profile    Screening for diabetes mellitus  -     Hemoglobin A1c; Future  -     Hemoglobin A1c    Screening for hypothyroidism  -     TSH with free T4 reflex; Future  -     TSH with free T4 reflex    Hymenoptera allergy  -     EPINEPHrine (ANY BX GENERIC EQUIV) 0.3 MG/0.3ML injection 2-pack; Inject 0.3 mLs (0.3 mg) into the muscle as needed for anaphylaxis    Moderate persistent asthma without complication  -     fluticasone (FLOVENT HFA) 110 MCG/ACT inhaler; Inhale 1 puff into the lungs 2 times daily    Other orders  -     COVID-19 12+ (2023-24) (PFIZER)      Will notify of lab results when available. Discussed diet, exercise and healthy lifestyle changes. Continue current medications.       Will Reynoso MD          Answers submitted by the patient for this visit:  Patient Health Questionnaire (Submitted on 10/31/2023)  If you checked off any problems, how difficult have these problems made it for you to do your work, take care of things at home, or get along with other people?: Not difficult at all  PHQ9 TOTAL SCORE: 0  Annual Preventive Visit (Submitted on 10/31/2023)  Chief Complaint: Annual Exam:  Frequency of exercise:: 4-5 days/week  Getting at least 3 servings of Calcium per day:: Yes  Diet:: Gluten-free/reduced  Taking medications regularly:: Yes  Medication side effects:: Not applicable  Bi-annual eye exam:: Yes  Dental care twice a year:: Yes  Sleep apnea or symptoms of sleep apnea:: None  abdominal pain: No  Blood in stool: No  Blood in urine: No  chest pain: No  chills: No  congestion: No  constipation: No  cough: No  diarrhea: No  dizziness: No  ear pain: No  eye pain: No  nervous/anxious: No  fever: No  frequency: No  genital sores: No  headaches: No  hearing loss: No  heartburn: No  arthralgias: No  joint swelling: No  peripheral edema: No  mood  changes: No  myalgias: No  nausea: No  dysuria: No  palpitations: No  Skin sensation changes: No  sore throat: No  urgency: No  rash: No  shortness of breath: No  visual disturbance: No  weakness: No  pelvic pain: No  vaginal bleeding: No  vaginal discharge: No  tenderness: No  breast mass: No  breast discharge: No  Additional concerns today:: Yes  Exercise outside of work (Submitted on 10/31/2023)  Chief Complaint: Annual Exam:  Duration of exercise:: Other

## 2023-10-31 NOTE — LETTER
My Asthma Action Plan    Name: Ita Poole   YOB: 1990  Date: 10/31/2023   My doctor: Will Reynoso MD   My clinic: [facility]        My Control Medicine: Fluticasone propionate (Flovent HFA) - 110 mcg take 1 puff twice daily  My Rescue Medicine: Albuterol (Proair/Ventolin/Proventil HFA) 2-4 puffs EVERY 4 HOURS as needed. Use a spacer if recommended by your provider.   My Asthma Severity:   Moderate Persistent  Know your asthma triggers: smoke, upper respiratory infections, and animal dander               GREEN ZONE   Good Control  I feel good  No cough or wheeze  Can work, sleep and play without asthma symptoms       Take your asthma control medicine every day.     If exercise triggers your asthma, take your rescue medication  15 minutes before exercise or sports, and  During exercise if you have asthma symptoms  Spacer to use with inhaler: If you have a spacer, make sure to use it with your inhaler             YELLOW ZONE Getting Worse  I have ANY of these:  I do not feel good  Cough or wheeze  Chest feels tight  Wake up at night   Keep taking your Green Zone medications  Start taking your rescue medicine:  every 20 minutes for up to 1 hour. Then every 4 hours for 24-48 hours.  If you stay in the Yellow Zone for more than 12-24 hours, contact your doctor.  If you do not return to the Green Zone in 12-24 hours or you get worse, start taking your oral steroid medicine if prescribed by your provider.           RED ZONE Medical Alert - Get Help  I have ANY of these:  I feel awful  Medicine is not helping  Breathing getting harder  Trouble walking or talking  Nose opens wide to breathe       Take your rescue medicine NOW  If your provider has prescribed an oral steroid medicine, start taking it NOW  Call your doctor NOW  If you are still in the Red Zone after 20 minutes and you have not reached your doctor:  Take your rescue medicine again and  Call 911 or go to the emergency  room right away    See your regular doctor within 2 weeks of an Emergency Room or Urgent Care visit for follow-up treatment.          Annual Reminders:  Meet with Asthma Educator,  Flu Shot in the Fall, consider Pneumonia Vaccination for patients with asthma (aged 19 and older).    Pharmacy:    [id number] ([facility] - [place], [place] - [address]  [facility] - [place] - [address]  [id number] - [place], [place] - [address]  [facility]    Electronically signed by Will Reynoso MD   Date: 10/31/23                      Asthma Triggers  How To Control Things That Make Your Asthma Worse    Triggers are things that make your asthma worse.  Look at the list below to help you find your triggers and what you can do about them.  You can help prevent asthma flare-ups by staying away from your triggers.      Trigger                                                          What you can do   Cigarette Smoke  Tobacco smoke can make asthma worse. Do not allow smoking in your home, car or around you.  Be sure no one smokes at a child s day care or school.  If you smoke, ask your health care provider for ways to help you quit.  Ask family members to quit too.  Ask your health care provider for a referral to Quit Plan to help you quit smoking, or call 1-349-742-PLAN.     Colds, Flu, Bronchitis  These are common triggers of asthma. Wash your hands often.  Don t touch your eyes, nose or mouth.  Get a flu shot every year.     Dust Mites  These are tiny bugs that live in cloth or carpet. They are too small to see. Wash sheets and blankets in hot water every week.   Encase pillows and mattress in dust mite proof covers.  Avoid having carpet if you can. If you have carpet, vacuum weekly.   Use a dust mask and HEPA vacuum.   Pollen and Outdoor Mold  Some people are allergic to trees, grass, or weed pollen, or molds. Try to keep  your windows closed.  Limit time out doors when pollen count is high.   Ask you health care provider about taking medicine during allergy season.     Animal Dander  Some people are allergic to skin flakes, urine or saliva from pets with fur or feathers. Keep pets with fur or feathers out of your home.    If you can t keep the pet outdoors, then keep the pet out of your bedroom.  Keep the bedroom door closed.  Keep pets off cloth furniture and away from stuffed toys.     Mice, Rats, and Cockroaches   Some people are allergic to the waste from these pests.   Cover food and garbage.  Clean up spills and food crumbs.  Store grease in the refrigerator.   Keep food out of the bedroom.   Indoor Mold  This can be a trigger if your home has high moisture. Fix leaking faucets, pipes, or other sources of water.   Clean moldy surfaces.  Dehumidify basement if it is damp and smelly.   Smoke, Strong Odors, and Sprays  These can reduce air quality. Stay away from strong odors and sprays, such as perfume, powder, hair spray, paints, smoke incense, paint, cleaning products, candles and new carpet.   Exercise or Sports  Some people with asthma have this trigger. Be active!  Ask your doctor about taking medicine before sports or exercise to prevent symptoms.    Warm up for 5-10 minutes before and after sports or exercise.     Other Triggers of Asthma  Cold air:  Cover your nose and mouth with a scarf.  Sometimes laughing or crying can be a trigger.  Some medicines and food can trigger asthma.

## 2023-11-20 ENCOUNTER — MYC MEDICAL ADVICE (OUTPATIENT)
Dept: FAMILY MEDICINE | Facility: OTHER | Age: 33
End: 2023-11-20
Payer: COMMERCIAL

## 2023-12-22 ENCOUNTER — MYC MEDICAL ADVICE (OUTPATIENT)
Dept: FAMILY MEDICINE | Facility: OTHER | Age: 33
End: 2023-12-22
Payer: COMMERCIAL

## 2023-12-22 DIAGNOSIS — R20.2 NUMBNESS AND TINGLING IN BOTH HANDS: Primary | ICD-10-CM

## 2023-12-22 DIAGNOSIS — R20.0 NUMBNESS AND TINGLING IN BOTH HANDS: Primary | ICD-10-CM

## 2024-01-04 ENCOUNTER — HOSPITAL ENCOUNTER (OUTPATIENT)
Dept: GENERAL RADIOLOGY | Facility: OTHER | Age: 34
Discharge: HOME | End: 2024-01-04
Payer: COMMERCIAL

## 2024-01-04 ENCOUNTER — OFFICE VISIT (OUTPATIENT)
Dept: FAMILY MEDICINE | Facility: OTHER | Age: 34
End: 2024-01-04
Payer: COMMERCIAL

## 2024-01-04 VITALS
DIASTOLIC BLOOD PRESSURE: 76 MMHG | TEMPERATURE: 96.3 F | HEART RATE: 88 BPM | OXYGEN SATURATION: 98 % | BODY MASS INDEX: 31.89 KG/M2 | HEIGHT: 63 IN | WEIGHT: 180 LBS | SYSTOLIC BLOOD PRESSURE: 123 MMHG | RESPIRATION RATE: 16 BRPM

## 2024-01-04 DIAGNOSIS — J02.9 ACUTE PHARYNGITIS, UNSPECIFIED ETIOLOGY: ICD-10-CM

## 2024-01-04 DIAGNOSIS — J45.901 ASTHMA WITH ACUTE EXACERBATION, UNSPECIFIED ASTHMA SEVERITY, UNSPECIFIED WHETHER PERSISTENT: ICD-10-CM

## 2024-01-04 DIAGNOSIS — R05.1 ACUTE COUGH: Primary | ICD-10-CM

## 2024-01-04 LAB
FLUAV RNA SPEC QL NAA+PROBE: NEGATIVE
FLUBV RNA RESP QL NAA+PROBE: NEGATIVE
GROUP A STREP BY PCR: NOT DETECTED
RSV RNA SPEC NAA+PROBE: NEGATIVE
SARS-COV-2 RNA RESP QL NAA+PROBE: NEGATIVE

## 2024-01-04 PROCEDURE — 71046 X-RAY EXAM CHEST 2 VIEWS: CPT

## 2024-01-04 PROCEDURE — 99214 OFFICE O/P EST MOD 30 MIN: CPT

## 2024-01-04 PROCEDURE — G0463 HOSPITAL OUTPT CLINIC VISIT: HCPCS

## 2024-01-04 PROCEDURE — 87651 STREP A DNA AMP PROBE: CPT | Mod: ZL

## 2024-01-04 PROCEDURE — 87637 SARSCOV2&INF A&B&RSV AMP PRB: CPT | Mod: ZL

## 2024-01-04 RX ORDER — PREDNISONE 20 MG/1
40 TABLET ORAL DAILY
Qty: 10 TABLET | Refills: 0 | Status: SHIPPED | OUTPATIENT
Start: 2024-01-04 | End: 2024-01-09

## 2024-01-04 ASSESSMENT — PAIN SCALES - GENERAL: PAINLEVEL: SEVERE PAIN (6)

## 2024-01-04 NOTE — LETTER
[facility]  [address]  [place] [address]  Phone: [phone]  Fax: [phone]    January 4, 2024        Ita Poole  [address] [place] [id number]          To whom it may concern:    RE: Ita Poole    Patient was seen and treated today at our clinic and missed work. Please excuse today, test results pending at this time.     Please contact me for questions or concerns.      Sincerely,      Yue Richards, CNP

## 2024-01-04 NOTE — PROGRESS NOTES
ASSESSMENT/PLAN:    (R05.1) Acute cough  (primary encounter diagnosis); (J02.9) Acute pharyngitis, unspecified etiology; (J45.901) Asthma with acute exacerbation, unspecified asthma severity, unspecified whether persistent  Comment: Patient has had a cough for the past 2 weeks and symptoms worsened in the last 2 days with wheezing, body aches, and nasal drainage. Sore throat started 2 days ago.  She does have history of asthma and does have inhalers at home.  On exam today bilateral breath sounds with wheezing at bases, posterior pharynx with erythema.  Strep test was obtained and was negative.  Multiplex viral testing was obtained and was negative.  A chest x-ray was also obtained due to 2-week history of cough with worsening symptoms and was negative for pneumonia.  Discussed with patient that symptoms and exam are consistent with viral illness.  Discussed that symptomatic treatment of cough is appropriate but not with antibiotics.  With the increased shortness of breath and ongoing wheezing as well as wheezing on exam I do recommend treatment of an asthma exacerbation.  Prednisone was sent to the pharmacy for her.  Symptomatic care also recommended.  Return to care parameters reviewed with patient.  Plan: Symptomatic Influenza A/B, RSV, & SARS-CoV2 PCR        (COVID-19) Nose, XR Chest 2 Views        Group A Streptococcus PCR Throat Swab        predniSONE (DELTASONE) 20 MG tablet  Symptomatic treatment - Encouraged fluids, salt water gargles, honey (only if greater than 1 year in age due to risk of botulism), elevation, humidifier, sinus rinse/netti pot, lozenges, tea, topical vapor rub, popsicles, rest, etc     May use over-the-counter Tylenol or ibuprofen PRN    Discussed warning signs/symptoms indicative of need to f/u    Follow up if symptoms persist or worsen or concerns    I have reviewed the nursing notes.  I have reviewed the findings, diagnosis, plan and need for follow up with the patient.    I explained  my diagnostic considerations and recommendations to the patient, who voiced understanding and agreement with the treatment plan. All questions were answered. We discussed potential side effects of any prescribed or recommended therapies, as well as expectations for response to treatments.    ANDREAS CALLEJAS, OLVIN CNP  2024  9:49 AM    HPI:    Ita Poole is a 33 year old female  who presents to [facility] today for concerns of cough, sore throat.    Patient has had a cough for the past 2 weeks and symptoms worsened in the last 2 days with wheezing, body aches, and nasal drainage. Sore throat started 2 days ago.  She does have asthma and has inhalers as ordered at home.    Allergies as listed.    PCP: Edgardo    Past Medical History:   Diagnosis Date    Allergic rhinitis     No Comments Provided    Celiac disease     No Comments Provided    Mass of right index finger at the PIP joint 2018    S/P excision of mass right index finger 2018    Uncomplicated asthma     No Comments Provided     Past Surgical History:   Procedure Laterality Date     SECTION      ,4/10/14, Section    COLONOSCOPY      age 10    ESOPHAGOSCOPY, GASTROSCOPY, DUODENOSCOPY (EGD), COMBINED      [id number],ESOPHAGOGASTRODUODENOSCOPY,age 10, small bowel bx: celiac disease    EXCISE MASS UPPER EXTREMITY Right 2018    Procedure: EXCISE MASS UPPER EXTREMITY;  Right Index Finger Mass Excision;  Surgeon: Nate Allen DO;  Location: [facility] OR    LAPAROSCOPIC CHOLECYSTECTOMY      9/22/15,Cholecystectomy,Laparoscopic    S/P EXCISION OF MASS RIGHT INDEX FINGER Right 2018     Social History     Tobacco Use    Smoking status: Never     Passive exposure: Current    Smokeless tobacco: Never    Tobacco comments:     Quit smoking: passive exposure   Substance Use Topics    Alcohol use: Yes     Comment: social - one per month     Current Outpatient Medications   Medication Sig Dispense Refill    albuterol (PROAIR  HFA/PROVENTIL HFA/VENTOLIN HFA) 108 (90 Base) MCG/ACT inhaler Inhale 2 puffs into the lungs every 4 hours as needed for wheezing 18 g 11    buPROPion (WELLBUTRIN XL) 300 MG 24 hr tablet Take 300 mg by mouth every morning      cyclobenzaprine (FLEXERIL) 10 MG tablet TAKE 1 TABLET BY MOUTH 3 TIMES DAILY IF NEEDED FOR MUSCLE SPASM. 30 tablet 4    EPINEPHrine (ANY BX GENERIC EQUIV) 0.3 MG/0.3ML injection 2-pack Inject 0.3 mLs (0.3 mg) into the muscle as needed for anaphylaxis 2 each 11    fluticasone (FLOVENT HFA) 110 MCG/ACT inhaler Inhale 1 puff into the lungs 2 times daily 12 g 11    ibuprofen (ADVIL/MOTRIN) 800 MG tablet TAKE 1 TABLET BY MOUTH 3 TIMES DAILY IF NEEDED FOR PAIN. 180 tablet 0    LORazepam (ATIVAN) 0.5 MG tablet TAKE 1 TABLET (0.5 MG) BY MOUTH 3 TIMES WEEKLY AS NEEDED FOR PANIC ATTACKS MUST LAST 30 DAYS      LORazepam (ATIVAN) 1 MG tablet Take 1 mg by mouth every 6 hours as needed for anxiety      medical cannabis (Patient's own supply) See Admin Instructions (The purpose of this order is to document that the patient reports taking medical cannabis.  This is not a prescription, and is not used to certify that the patient has a qualifying medical condition.)      MONO-LINYAH 0.25-35 MG-MCG tablet TAKE 1 TABLET BY MOUTH EVERY DAY *DUE FOR ANNUAL VISIT WITH PCP FOR FUTURE REFILLS* 84 tablet 11    predniSONE (DELTASONE) 20 MG tablet Take 2 tablets (40 mg) by mouth daily for 5 days 10 tablet 0    risperiDONE (RISPERDAL) 2 MG tablet TAKE 1 TABLET (2MG) BY MOUTH IN THE MORNING. TAKE 1/2 TABLET (1MG) IN THE EVENING AS NEEDED.      zolpidem ER (AMBIEN CR) 12.5 MG CR tablet TAKE 1 TABLET (12.5MG) BY MOUTH AT BEDTIME       Allergies   Allergen Reactions    Amoxicillin-Pot Clavulanate Diarrhea     Yeast Infection in colon    Amoxicillin-Pot Clavulanate Diarrhea     Cannot have due to wheat    Azithromycin Nausea    Azithromycin      Can't take because it contains wheat    Codeine      constipation    Gluten      "Gluten Meal      Other reaction(s): Constipation  Celiac disease, abdominal pain    Food Diarrhea     Other reaction(s): Constipation  Wheat, rye, barley     Past medical history, past surgical history, current medications and allergies reviewed and accurate to the best of my knowledge.      ROS:  Refer to HPI    /76   Pulse 88   Temp (!) 96.3  F (35.7  C) (Tympanic)   Resp 16   Ht 1.6 m (5' 3\")   Wt 81.6 kg (180 lb)   LMP 12/29/2023 (Exact Date)   SpO2 98%   Breastfeeding No   BMI 31.89 kg/m      EXAM:  General Appearance: Well appearing 33 year old female, appropriate appearance for age. No acute distress   Ears: Left TM intact, translucent with bony landmarks appreciated, no erythema, no effusion, no bulging, no purulence.  Right TM intact, translucent with bony landmarks appreciated, no erythema, no effusion, no bulging, no purulence.  Left auditory canal clear.  Right auditory canal clear.  Normal external ears, non tender.  Eyes: conjunctivae normal without erythema or irritation, corneas clear, no drainage or crusting, no eyelid swelling, pupils equal   Oropharynx: moist mucous membranes, posterior pharynx with erythema, no exudates or petechiae, no post nasal drip seen, no trismus, voice clear.    Sinuses:  No sinus tenderness upon palpation of the frontal or maxillary sinuses  Nose:  Bilateral nares: no erythema, no edema, no drainage or congestion   Neck: supple without adenopathy  Respiratory: normal chest wall and respirations.  Normal effort.  Bilateral wheezing at bases.  No increased work of breathing.  No cough appreciated.  Cardiac: RRR with no murmurs  Abdomen: soft, nontender, no rigidity, no rebound tenderness or guarding, normal bowel sounds present  Musculoskeletal:  Equal movement of bilateral upper extremities.  Equal movement of bilateral lower extremities.  Normal gait.    Dermatological: no rashes noted of exposed skin  Neuro: Alert and oriented to person, place, and time.  " Cranial nerves II-XII grossly intact with no focal or lateralizing deficits.  Muscle tone normal.  Gait normal. No tremor.   Psychological: normal affect, alert, oriented, and pleasant.     Labs/Xray:  Results for orders placed or performed in visit on 01/04/24   XR Chest 2 Views     Status: None    Narrative    Exam:  XR CHEST 2 VIEWS    HISTORY: Acute cough.    COMPARISON:  9/27/2023, 10/24/2021    FINDINGS:     The cardiomediastinal contours are normal.      No focal consolidation, effusion, or pneumothorax.      No acute osseous abnormality.       Impression    IMPRESSION:      No acute cardiopulmonary process.      REJI AUGUSTINE MD         SYSTEM ID:  [id number]   Symptomatic Influenza A/B, RSV, & SARS-CoV2 PCR (COVID-19) Nose     Status: Normal    Specimen: Nose; Swab   Result Value Ref Range    Influenza A PCR Negative Negative    Influenza B PCR Negative Negative    RSV PCR Negative Negative    SARS CoV2 PCR Negative Negative    Narrative    Testing was performed using the Xpert Xpress CoV2/Flu/RSV Assay on the [employer] GeneXpert Instrument. This test should be ordered for the detection of SARS-CoV-2, influenza, and RSV viruses in individuals who meet clinical and/or epidemiological criteria. Test performance is unknown in asymptomatic patients. This test is for in vitro diagnostic use under the FDA EUA for laboratories certified under CLIA to perform high or moderate complexity testing. This test has not been FDA cleared or approved. A negative result does not rule out the presence of PCR inhibitors in the specimen or target RNA in concentration below the limit of detection for the assay. If only one viral target is positive but coinfection with multiple targets is suspected, the sample should be re-tested with another FDA cleared, approved, or authorized test, if coinfection would change clinical management. This test was validated by the [facility]. These laboratories are certified  under the Clinical Laboratory Improvement Amendments of 1988 (CLIA-88) as qualified to perform high complexity laboratory testing.   Group A Streptococcus PCR Throat Swab     Status: Normal    Specimen: Throat; Swab   Result Value Ref Range    Group A strep by PCR Not Detected Not Detected    Narrative    The Xpert Xpress Strep A test, performed on the [employer], is a rapid, qualitative in vitro diagnostic test for the detection of Streptococcus pyogenes (Group A ß-hemolytic Streptococcus, Strep A) in throat swab specimens from patients with signs and symptoms of pharyngitis. The Xpert Xpress Strep A test can be used as an aid in the diagnosis of Group A Streptococcal pharyngitis. The assay is not intended to monitor treatment for Group A Streptococcus infections. The Xpert Xpress Strep A test utilizes an automated real-time polymerase chain reaction (PCR) to detect Streptococcus pyogenes DNA.

## 2024-01-04 NOTE — NURSING NOTE
"Chief Complaint   Patient presents with    Cough    Pharyngitis   Patient presents to clinic for a cough present for 2 weeks. Symptoms worsened in the last 2 days with wheezing, sore throat, nasal drainage, body aches, and pain in her back.      Yuli Dempsey on 1/4/2024 at 9:49 AM        Initial /76   Pulse 88   Temp (!) 96.3  F (35.7  C) (Tympanic)   Resp 16   Ht 1.6 m (5' 3\")   Wt 81.6 kg (180 lb)   LMP 12/29/2023 (Exact Date)   SpO2 98%   Breastfeeding No   BMI 31.89 kg/m   Estimated body mass index is 31.89 kg/m  as calculated from the following:    Height as of this encounter: 1.6 m (5' 3\").    Weight as of this encounter: 81.6 kg (180 lb).       FOOD SECURITY SCREENING QUESTIONS:    The next two questions are to help us understand your food security.  If you are feeling you need any assistance in this area, we have resources available to support you today.    Hunger Vital Signs:  Within the past 12 months we worried whether our food would run out before we got money to buy more. Never  Within the past 12 months the food we bought just didn't last and we didn't have money to get more. Never      Yuli Dempsey     "

## 2024-01-15 ENCOUNTER — OFFICE VISIT (OUTPATIENT)
Dept: NEUROLOGY | Facility: OTHER | Age: 34
End: 2024-01-15
Attending: PSYCHIATRY & NEUROLOGY
Payer: COMMERCIAL

## 2024-01-15 VITALS
OXYGEN SATURATION: 98 % | SYSTOLIC BLOOD PRESSURE: 121 MMHG | HEIGHT: 63 IN | DIASTOLIC BLOOD PRESSURE: 70 MMHG | HEART RATE: 72 BPM | BODY MASS INDEX: 32.07 KG/M2 | RESPIRATION RATE: 16 BRPM | WEIGHT: 181 LBS

## 2024-01-15 DIAGNOSIS — M79.642 PAIN OF LEFT HAND: ICD-10-CM

## 2024-01-15 DIAGNOSIS — G56.01 CARPAL TUNNEL SYNDROME OF RIGHT WRIST: Primary | ICD-10-CM

## 2024-01-15 PROCEDURE — 95886 MUSC TEST DONE W/N TEST COMP: CPT | Mod: 26 | Performed by: PSYCHIATRY & NEUROLOGY

## 2024-01-15 PROCEDURE — G0463 HOSPITAL OUTPT CLINIC VISIT: HCPCS

## 2024-01-15 PROCEDURE — 99204 OFFICE O/P NEW MOD 45 MIN: CPT | Mod: 25 | Performed by: PSYCHIATRY & NEUROLOGY

## 2024-01-15 PROCEDURE — 95913 NRV CNDJ TEST 13/> STUDIES: CPT | Performed by: PSYCHIATRY & NEUROLOGY

## 2024-01-15 PROCEDURE — 95886 MUSC TEST DONE W/N TEST COMP: CPT | Performed by: PSYCHIATRY & NEUROLOGY

## 2024-01-15 PROCEDURE — 95913 NRV CNDJ TEST 13/> STUDIES: CPT | Mod: 26 | Performed by: PSYCHIATRY & NEUROLOGY

## 2024-01-15 ASSESSMENT — PAIN SCALES - GENERAL: PAINLEVEL: NO PAIN (0)

## 2024-01-15 NOTE — LETTER
1/15/2024         RE: Ita Poole  [address] [place] [id number]        Dear Colleague,    Thank you for referring your patient, Ita Poole, to the [facility]. Please see a copy of my visit note below.    Referring physician: Will Reynoso MD    Chief complaint: Patient presents for numbness and pain in both hands.    History: The patient relates that for about 4 years she has had recurrent pain and paresthesia affecting the right hand more often than the left.  She is right-handed.  She also describes a very curious symptom of episodic bilateral cyanosis of the thenar eminence.  She thinks she is losing  strength on the right side.  Discomfort involves all 5 digits of both hands.    Past medical history: The patient has some chronic anxiety problems and is gluten intolerant.    Review of systems: 10 system review of systems other than the above is negative.  The patient does not have sensory symptoms in the lower extremities.    Physical examination: The patient is a deconditioned appearing 33-year-old.  She is 63 inches tall and weighs 181 pounds.  Blood pressure is 121/70.  Strength is grossly normal and symmetric for the intrinsic hand muscles, finger extensors, forearm flexors and forearm extensors.  Reflexes are 1+/4 bilaterally for the biceps, triceps, and brachioradialis tendons.  Pinprick is intact in the cervical dermatomes of the upper extremities.    Nerve conduction studies: Motor nerve conduction testing was performed bilaterally for the median and ulnar nerves.  Distal latencies, amplitudes, and H reflex latencies were normal throughout.  There was mild slowing for the median nerve at the wrist.    Orthodromic mixed conduction studies were performed bilaterally for the median and ulnar nerves.  Antidromic sensory testing was performed for the radial nerves.  Latencies, amplitudes, and conduction velocities were normal.    Monopolar EMG needle examination: EMG  was performed bilaterally for the first dorsal interosseous, extensor digitorum commonness, brachioradialis, flexor carpi radialis, and triceps.  All of the tested muscles showed normal insertional activity.  Motor units were normal in size with normal recruitment and interference.    Impression: The patient has nerve conduction abnormalities consistent with mild right carpal tunnel syndrome but there is no evidence for the condition on the left.  She looks to be otherwise neurologically intact.  She was encouraged to use a wrist splint consistently when sleeping for the next 2 months.  Hopefully that will be enough to resolve her symptoms.      Again, thank you for allowing me to participate in the care of your patient.        Sincerely,        Hans Padgett MD

## 2024-01-15 NOTE — PROGRESS NOTES
Referring physician: Will Reynoso MD    Chief complaint: Patient presents for numbness and pain in both hands.    History: The patient relates that for about 4 years she has had recurrent pain and paresthesia affecting the right hand more often than the left.  She is right-handed.  She also describes a very curious symptom of episodic bilateral cyanosis of the thenar eminence.  She thinks she is losing  strength on the right side.  Discomfort involves all 5 digits of both hands.    Past medical history: The patient has some chronic anxiety problems and is gluten intolerant.    Review of systems: 10 system review of systems other than the above is negative.  The patient does not have sensory symptoms in the lower extremities.    Physical examination: The patient is a deconditioned appearing 33-year-old.  She is 63 inches tall and weighs 181 pounds.  Blood pressure is 121/70.  Strength is grossly normal and symmetric for the intrinsic hand muscles, finger extensors, forearm flexors and forearm extensors.  Reflexes are 1+/4 bilaterally for the biceps, triceps, and brachioradialis tendons.  Pinprick is intact in the cervical dermatomes of the upper extremities.    Nerve conduction studies: Motor nerve conduction testing was performed bilaterally for the median and ulnar nerves.  Distal latencies, amplitudes, and H reflex latencies were normal throughout.  There was mild slowing for the median nerve at the wrist.    Orthodromic mixed conduction studies were performed bilaterally for the median and ulnar nerves.  Antidromic sensory testing was performed for the radial nerves.  Latencies, amplitudes, and conduction velocities were normal.    Monopolar EMG needle examination: EMG was performed bilaterally for the first dorsal interosseous, extensor digitorum commonness, brachioradialis, flexor carpi radialis, and triceps.  All of the tested muscles showed normal insertional activity.  Motor units were normal in size  with normal recruitment and interference.    Impression: The patient has nerve conduction abnormalities consistent with mild right carpal tunnel syndrome but there is no evidence for the condition on the left.  She looks to be otherwise neurologically intact.  She was encouraged to use a wrist splint consistently when sleeping for the next 2 months.  Hopefully that will be enough to resolve her symptoms.

## 2024-01-16 ENCOUNTER — MYC MEDICAL ADVICE (OUTPATIENT)
Dept: FAMILY MEDICINE | Facility: OTHER | Age: 34
End: 2024-01-16
Payer: COMMERCIAL

## 2024-02-09 ENCOUNTER — LAB REQUISITION (OUTPATIENT)
Dept: LAB | Facility: OTHER | Age: 34
End: 2024-02-09

## 2024-02-09 ENCOUNTER — OFFICE VISIT (OUTPATIENT)
Dept: FAMILY MEDICINE | Facility: OTHER | Age: 34
End: 2024-02-09
Attending: NURSE PRACTITIONER
Payer: COMMERCIAL

## 2024-02-09 VITALS
BODY MASS INDEX: 31.36 KG/M2 | OXYGEN SATURATION: 98 % | SYSTOLIC BLOOD PRESSURE: 128 MMHG | HEART RATE: 103 BPM | WEIGHT: 177 LBS | RESPIRATION RATE: 16 BRPM | TEMPERATURE: 97.8 F | DIASTOLIC BLOOD PRESSURE: 82 MMHG | HEIGHT: 63 IN

## 2024-02-09 DIAGNOSIS — J11.1 INFLUENZA-LIKE ILLNESS: ICD-10-CM

## 2024-02-09 DIAGNOSIS — J10.1 INFLUENZA B: Primary | ICD-10-CM

## 2024-02-09 DIAGNOSIS — Z20.828 EXPOSURE TO INFLUENZA: ICD-10-CM

## 2024-02-09 LAB
FLUAV RNA SPEC QL NAA+PROBE: NEGATIVE
FLUBV RNA RESP QL NAA+PROBE: POSITIVE
RSV RNA SPEC NAA+PROBE: NEGATIVE
SARS-COV-2 RNA RESP QL NAA+PROBE: NEGATIVE
SARS-COV-2 RNA RESP QL NAA+PROBE: NEGATIVE

## 2024-02-09 PROCEDURE — 87637 SARSCOV2&INF A&B&RSV AMP PRB: CPT | Mod: ZL | Performed by: NURSE PRACTITIONER

## 2024-02-09 PROCEDURE — 87635 SARS-COV-2 COVID-19 AMP PRB: CPT | Performed by: FAMILY MEDICINE

## 2024-02-09 PROCEDURE — 99213 OFFICE O/P EST LOW 20 MIN: CPT | Performed by: NURSE PRACTITIONER

## 2024-02-09 PROCEDURE — G0463 HOSPITAL OUTPT CLINIC VISIT: HCPCS

## 2024-02-09 RX ORDER — OSELTAMIVIR PHOSPHATE 75 MG/1
75 CAPSULE ORAL 2 TIMES DAILY
Qty: 10 CAPSULE | Refills: 0 | Status: SHIPPED | OUTPATIENT
Start: 2024-02-09 | End: 2024-02-14

## 2024-02-09 ASSESSMENT — PAIN SCALES - GENERAL: PAINLEVEL: SEVERE PAIN (6)

## 2024-02-09 NOTE — PROGRESS NOTES
ASSESSMENT/PLAN:     I have reviewed the nursing notes.  I have reviewed the findings, diagnosis, plan and need for follow up with the patient.        1. Influenza-like illness    - Symptomatic Influenza A/B, RSV, & SARS-CoV2 PCR (COVID-19) Nose    2. Exposure to influenza    - Symptomatic Influenza A/B, RSV, & SARS-CoV2 PCR (COVID-19) Nose    3. Influenza B    - oseltamivir (TAMIFLU) 75 MG capsule; Take 1 capsule (75 mg) by mouth 2 times daily for 5 days  Dispense: 10 capsule; Refill: 0      Viral PCR testing:  Positive Influenza B  Negative Influenza A  Negative Covid  Negative RSV    Discussed with patient that symptoms and exam are consistent with viral illness.    No clinical indications for antibiotic treatment at this time.  Patient requests antiviral treatment for influenza     Symptomatic treatment - Encouraged fluids, salt water gargles, honey, elevation, humidifier, saline nasal spray, sinus rinse/netti pot, lozenges, tea, soup, smoothies, popsicles, topical vapor rub, rest, etc     May use over-the-counter Tylenol or ibuprofen PRN    Discussed warning signs/symptoms indicative of need to f/u  Follow up if symptoms persist or worsen or concerns      I explained my diagnostic considerations and recommendations to the patient, who voiced understanding and agreement with the treatment plan. All questions were answered. We discussed potential side effects of any prescribed or recommended therapies, as well as expectations for response to treatments.    Negra Arvizu NP  [facility] AND [facility]      SUBJECTIVE:   Ita Poole is a 33 year old female who presents to clinic today for the following health issues:  Influenza      HPI  Exposure to influenza B from her child.  She developed a cough 3 days ago.  Body aches started yesterday.  Nasal congestion started today.  No headaches.  No sore throat.  No fevers.  Chills.  Nausea and diarrhea.  No vomiting.  Appetite has been minimal.  Energy has  been at baseline.    Taking Ibuprofen and Tylenol.  Using Albuterol occasionally.   She had her influenza and Covid immunizations        Past Medical History:   Diagnosis Date    Allergic rhinitis     No Comments Provided    Celiac disease     No Comments Provided    Mass of right index finger at the PIP joint 2018    S/P excision of mass right index finger 2018    Uncomplicated asthma     No Comments Provided     Past Surgical History:   Procedure Laterality Date     SECTION      ,4/10/14, Section    COLONOSCOPY      age 10    ESOPHAGOSCOPY, GASTROSCOPY, DUODENOSCOPY (EGD), COMBINED      [id number],ESOPHAGOGASTRODUODENOSCOPY,age 10, small bowel bx: celiac disease    EXCISE MASS UPPER EXTREMITY Right 2018    Procedure: EXCISE MASS UPPER EXTREMITY;  Right Index Finger Mass Excision;  Surgeon: Nate Allen DO;  Location: [facility] OR    LAPAROSCOPIC CHOLECYSTECTOMY      9/22/15,Cholecystectomy,Laparoscopic    S/P EXCISION OF MASS RIGHT INDEX FINGER Right 2018     Social History     Tobacco Use    Smoking status: Never     Passive exposure: Current    Smokeless tobacco: Never    Tobacco comments:     Quit smoking: passive exposure   Substance Use Topics    Alcohol use: Yes     Comment: social - one per month     Current Outpatient Medications   Medication Sig Dispense Refill    albuterol (PROAIR HFA/PROVENTIL HFA/VENTOLIN HFA) 108 (90 Base) MCG/ACT inhaler Inhale 2 puffs into the lungs every 4 hours as needed for wheezing 18 g 11    buPROPion (WELLBUTRIN XL) 300 MG 24 hr tablet Take 300 mg by mouth every morning      cyclobenzaprine (FLEXERIL) 10 MG tablet TAKE 1 TABLET BY MOUTH 3 TIMES DAILY IF NEEDED FOR MUSCLE SPASM. 30 tablet 4    EPINEPHrine (ANY BX GENERIC EQUIV) 0.3 MG/0.3ML injection 2-pack Inject 0.3 mLs (0.3 mg) into the muscle as needed for anaphylaxis 2 each 11    fluticasone (FLOVENT HFA) 110 MCG/ACT inhaler Inhale 1 puff into the lungs 2 times daily 12 g 11    ibuprofen  "(ADVIL/MOTRIN) 800 MG tablet TAKE 1 TABLET BY MOUTH 3 TIMES DAILY IF NEEDED FOR PAIN. 180 tablet 0    LORazepam (ATIVAN) 0.5 MG tablet TAKE 1 TABLET (0.5 MG) BY MOUTH 3 TIMES WEEKLY AS NEEDED FOR PANIC ATTACKS MUST LAST 30 DAYS      LORazepam (ATIVAN) 1 MG tablet Take 1 mg by mouth every 6 hours as needed for anxiety      medical cannabis (Patient's own supply) See Admin Instructions (The purpose of this order is to document that the patient reports taking medical cannabis.  This is not a prescription, and is not used to certify that the patient has a qualifying medical condition.)      MONO-LINYAH 0.25-35 MG-MCG tablet TAKE 1 TABLET BY MOUTH EVERY DAY *DUE FOR ANNUAL VISIT WITH PCP FOR FUTURE REFILLS* 84 tablet 11    risperiDONE (RISPERDAL) 2 MG tablet TAKE 1 TABLET (2MG) BY MOUTH IN THE MORNING. TAKE 1/2 TABLET (1MG) IN THE EVENING AS NEEDED.      zolpidem ER (AMBIEN CR) 12.5 MG CR tablet TAKE 1 TABLET (12.5MG) BY MOUTH AT BEDTIME       Allergies   Allergen Reactions    Amoxicillin-Pot Clavulanate Diarrhea     Yeast Infection in colon    Amoxicillin-Pot Clavulanate Diarrhea     Cannot have due to wheat    Azithromycin Nausea    Azithromycin      Can't take because it contains wheat    Codeine      constipation    Gluten     Gluten Meal      Other reaction(s): Constipation  Celiac disease, abdominal pain    Food Diarrhea     Other reaction(s): Constipation  Wheat, rye, barley         Past medical history, past surgical history, current medications and allergies reviewed and accurate to the best of my knowledge.        OBJECTIVE:     /82 (BP Location: Right arm, Patient Position: Sitting, Cuff Size: Adult Regular)   Pulse 103   Temp 97.8  F (36.6  C) (Tympanic)   Resp 16   Ht 1.6 m (5' 3\")   Wt 80.3 kg (177 lb)   LMP 02/05/2024 (Exact Date)   SpO2 98%   BMI 31.35 kg/m    Body mass index is 31.35 kg/m .        Physical Exam  General Appearance: Miserable but non toxic appearing adult female, appropriate " appearance for age. No acute distress  Ears: Left TM intact, no erythema, no effusion, no bulging, no purulence.  Right TM intact, no erythema, no effusion, no bulging, no purulence.  Left auditory canal clear without drainage or bleeding.  Right auditory canal clear without drainage or bleeding.  Normal external ears, non tender.  Eyes: conjunctivae normal without erythema or irritation, corneas clear, no drainage or crusting, no eyelid swelling, pupils equal   Orophayrnx: moist mucous membranes, pharynx with shiny erythema, tonsils without hypertrophy, tonsils without erythema, no tonsillar exudates, no oral lesions, no palate petechiae, no post nasal drip seen, no trismus, voice clear.    Nose:  congestion and clear drainage   Neck: supple without adenopathy  Respiratory: normal chest wall and respirations.  Normal effort.  Clear to auscultation bilaterally, no wheezing, crackles or rhonchi.  No increased work of breathing.  Frequent barky cough appreciated.  Cardiac: RRR with no murmurs  Musculoskeletal:  Equal movement of bilateral upper extremities.  Equal movement of bilateral lower extremities.  Normal gait.    Psychological: normal affect, alert, oriented, and pleasant.       Labs:  Results for orders placed or performed in visit on 02/09/24   Symptomatic Influenza A/B, RSV, & SARS-CoV2 PCR (COVID-19) Nose     Status: Abnormal    Specimen: Nose; Swab   Result Value Ref Range    Influenza A PCR Negative Negative    Influenza B PCR Positive (A) Negative    RSV PCR Negative Negative    SARS CoV2 PCR Negative Negative    Narrative    Testing was performed using the Xpert Xpress CoV2/Flu/RSV Assay on the [employer] GeneXpert Instrument. This test should be ordered for the detection of SARS-CoV-2, influenza, and RSV viruses in individuals who meet clinical and/or epidemiological criteria. Test performance is unknown in asymptomatic patients. This test is for in vitro diagnostic use under the FDA EUA for laboratories  certified under CLIA to perform high or moderate complexity testing. This test has not been FDA cleared or approved. A negative result does not rule out the presence of PCR inhibitors in the specimen or target RNA in concentration below the limit of detection for the assay. If only one viral target is positive but coinfection with multiple targets is suspected, the sample should be re-tested with another FDA cleared, approved, or authorized test, if coinfection would change clinical management. This test was validated by the [facility]. These laboratories are certified under the Clinical Laboratory Improvement Amendments of 1988 (CLIA-88) as qualified to perform high complexity laboratory testing.   Results for orders placed or performed in visit on 02/09/24   COVID-19 Virus (Coronavirus) by PCR Nose     Status: Normal    Specimen: Nose; Swab   Result Value Ref Range    SARS CoV2 PCR Negative Negative    Narrative    Testing was performed using the Xpert Xpress SARS-CoV-2 Assay on the [employer] Gene-Xpert [employer] Systems. Additional information about this Emergency Use Authorization (EUA) assay can be found via the Lab Guide. This test should be ordered for the detection of SARS-CoV-2 in individuals who meet SARS-CoV-2 clinical and/or epidemiological criteria as well as from individuals without symptoms or other reasons to suspect COVID-19. Test performance for asymptomatic patients has only been established in anterior nasal swab specimens. This test is for in vitro diagnostic use under the FDA EUA for laboratories certified under CLIA to perform high complexity testing. This test has not been FDA cleared or approved. A negative result does not rule out the presence of PCR inhibitors in the specimen or target RNA concentration below the limit of detection for the assay. The possibility of a false negative should be considered if the patient's recent exposure or clinical presentation suggests  COVID-19. This test was validated by [facility]. This laboratory is certified under the Clinical Laboratory Improvement Amendments (CLIA) as qualified to perform high complexity clinical laboratory testing.

## 2024-02-09 NOTE — LETTER
February 9, 2024      Ita Poole  [address] [place] [phone]        To Whom It May Concern:    Ita Poole  was seen on 2/9/24 due to influenza.  Please excuse her from work due to acute illness.      Sincerely,        Negra Arvizu, NP

## 2024-02-09 NOTE — NURSING NOTE
"Chief Complaint   Patient presents with    Nasal Congestion    Cough    Generalized Body Aches    Diarrhea     Patient here with daughter for nasal congestion. Cough x3 days, body aches, and diarrhea. Patient's son had Influenza B last week.  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    Initial /82 (BP Location: Right arm, Patient Position: Sitting, Cuff Size: Adult Regular)   Pulse 103   Temp 97.8  F (36.6  C) (Tympanic)   Resp 16   Ht 1.6 m (5' 3\")   Wt 80.3 kg (177 lb)   LMP 02/05/2024 (Exact Date)   SpO2 98%   BMI 31.35 kg/m   Estimated body mass index is 31.35 kg/m  as calculated from the following:    Height as of this encounter: 1.6 m (5' 3\").    Weight as of this encounter: 80.3 kg (177 lb).  Medication Review: complete    The next two questions are to help us understand your food security.  If you are feeling you need any assistance in this area, we have resources available to support you today.          10/31/2023   SDOH- Food Insecurity   Within the past 12 months, did you worry that your food would run out before you got money to buy more? N   Within the past 12 months, did the food you bought just not last and you didn t have money to get more? N         Health Care Directive:  Patient does not have a Health Care Directive or Living Will: Discussed advance care planning with patient; however, patient declined at this time.    Mara Barrios, LPN      "

## 2024-09-20 DIAGNOSIS — N92.6 IRREGULAR MENSES: ICD-10-CM

## 2024-09-23 RX ORDER — NORGESTIMATE AND ETHINYL ESTRADIOL 0.25-0.035
KIT ORAL
Qty: 84 TABLET | Refills: 11 | Status: SHIPPED | OUTPATIENT
Start: 2024-09-23

## 2024-09-23 NOTE — TELEPHONE ENCOUNTER
[facility] sent Rx request for the following:      Requested Prescriptions   Pending Prescriptions Disp Refills    MONO-LINYAH 0.25-35 MG-MCG tablet [Pharmacy Med Name: MONO-LINYAH 0.25MG-0.035MG TAB] 84 tablet 11     Sig: TAKE 1 TABLET BY MOUTH EVERY DAY *DUE FOR ANNUAL VISIT WITH PCP FOR FUTURE REFILLS*       Contraceptives Protocol Passed - 9/20/2024  1:39 PM       Last Prescription Date:   9/13/23  Last Fill Qty/Refills:         84, R-11    Last Office Visit:              10/31/23   Future Office visit:                Routing refill request to provider for review/approval   Dalila Townsend RN on 9/23/2024 at 4:12 PM

## 2024-09-25 ENCOUNTER — OFFICE VISIT (OUTPATIENT)
Dept: FAMILY MEDICINE | Facility: OTHER | Age: 34
End: 2024-09-25
Attending: PHYSICIAN ASSISTANT
Payer: COMMERCIAL

## 2024-09-25 ENCOUNTER — TELEPHONE (OUTPATIENT)
Dept: FAMILY MEDICINE | Facility: OTHER | Age: 34
End: 2024-09-25

## 2024-09-25 VITALS
HEIGHT: 62 IN | SYSTOLIC BLOOD PRESSURE: 162 MMHG | OXYGEN SATURATION: 97 % | TEMPERATURE: 99 F | DIASTOLIC BLOOD PRESSURE: 98 MMHG | RESPIRATION RATE: 20 BRPM | BODY MASS INDEX: 30.73 KG/M2 | HEART RATE: 92 BPM | WEIGHT: 167 LBS

## 2024-09-25 DIAGNOSIS — R11.2 NAUSEA AND VOMITING, UNSPECIFIED VOMITING TYPE: ICD-10-CM

## 2024-09-25 DIAGNOSIS — F33.1 MAJOR DEPRESSIVE DISORDER, RECURRENT EPISODE, MODERATE (H): ICD-10-CM

## 2024-09-25 DIAGNOSIS — R05.1 ACUTE COUGH: ICD-10-CM

## 2024-09-25 DIAGNOSIS — R03.0 ELEVATED BLOOD PRESSURE READING WITHOUT DIAGNOSIS OF HYPERTENSION: ICD-10-CM

## 2024-09-25 DIAGNOSIS — U07.1 INFECTION DUE TO 2019 NOVEL CORONAVIRUS: Primary | ICD-10-CM

## 2024-09-25 LAB
FLUAV RNA SPEC QL NAA+PROBE: NEGATIVE
FLUBV RNA RESP QL NAA+PROBE: NEGATIVE
RSV RNA SPEC NAA+PROBE: NEGATIVE
SARS-COV-2 RNA RESP QL NAA+PROBE: POSITIVE

## 2024-09-25 PROCEDURE — 87637 SARSCOV2&INF A&B&RSV AMP PRB: CPT | Mod: ZL

## 2024-09-25 PROCEDURE — 99214 OFFICE O/P EST MOD 30 MIN: CPT

## 2024-09-25 RX ORDER — PREDNISONE 20 MG/1
TABLET ORAL
Qty: 20 TABLET | Refills: 0 | Status: SHIPPED | OUTPATIENT
Start: 2024-09-25

## 2024-09-25 ASSESSMENT — ASTHMA QUESTIONNAIRES
QUESTION_1 LAST FOUR WEEKS HOW MUCH OF THE TIME DID YOUR ASTHMA KEEP YOU FROM GETTING AS MUCH DONE AT WORK, SCHOOL OR AT HOME: NONE OF THE TIME
QUESTION_2 LAST FOUR WEEKS HOW OFTEN HAVE YOU HAD SHORTNESS OF BREATH: ONCE A DAY
QUESTION_4 LAST FOUR WEEKS HOW OFTEN HAVE YOU USED YOUR RESCUE INHALER OR NEBULIZER MEDICATION (SUCH AS ALBUTEROL): ONE OR TWO TIMES PER DAY
ACT_TOTALSCORE: 18
QUESTION_3 LAST FOUR WEEKS HOW OFTEN DID YOUR ASTHMA SYMPTOMS (WHEEZING, COUGHING, SHORTNESS OF BREATH, CHEST TIGHTNESS OR PAIN) WAKE YOU UP AT NIGHT OR EARLIER THAN USUAL IN THE MORNING: ONCE OR TWICE
ACT_TOTALSCORE: 18
QUESTION_5 LAST FOUR WEEKS HOW WOULD YOU RATE YOUR ASTHMA CONTROL: COMPLETELY CONTROLLED

## 2024-09-25 ASSESSMENT — PAIN SCALES - GENERAL: PAINLEVEL: SEVERE PAIN (7)

## 2024-09-25 NOTE — PROGRESS NOTES
ASSESSMENT/PLAN:    I have reviewed the nursing notes.  I have reviewed the findings, diagnosis, plan and need for follow up with the patient.    1. Major depressive disorder, recurrent episode, moderate (H)    Answers submitted by the patient for this visit:  Patient Health Questionnaire (Submitted on 9/25/2024)  If you checked off any problems, how difficult have these problems made it for you to do your work, take care of things at home, or get along with other people?: Not difficult at all  PHQ9 TOTAL SCORE: 0    - Patient denies SI or HI today.      - Medication management is by Michelle at [facility].  Patient takes Wellbutrin, Ativan, risperidone, and Ambien for her depression.    2. Acute cough  3. Nausea and vomiting, unspecified vomiting type    - Symptomatic Influenza A/B, RSV, & SARS-CoV2 PCR (COVID-19) Nose- COVID positive    4. Infection due to 2019 novel coronavirus    - nirmatrelvir and ritonavir (PAXLOVID) 300 mg/100 mg therapy pack; Take 3 tablets by mouth 2 times daily for 5 days. (Take 2 Nirmatrelvir tablets and 1 Ritonavir tablet twice daily for 5 days)  Dispense: 30 tablet; Refill: 0    - May use over-the-counter Tylenol and ibuprofen as needed for pain, inflammation or fever    5. Elevated blood pressure reading without diagnosis of hypertension    - No history of hypertension.  Patient states that her blood pressure becomes elevated when she is ill.    - Discussed warning signs/symptoms indicative of need to f/u    - Follow up if symptoms persist or worsen or concerns    - I explained my diagnostic considerations and recommendations to the patient, who voiced understanding and agreement with the treatment plan. All questions were answered. We discussed potential side effects of any prescribed or recommended therapies, as well as expectations for response to treatments.    OLVIN Garcia CNP  9/25/2024  4:30 PM    HPI:    Ita Poole is a 34 year old female who presents to [facility]  today for concerns of fever, diarrhea, cough ( on and off ), sinus congestion, headache since Thursday.  Dizziness on and off for the past couple of days.  At home care treatment consists of Ibuprofen, rest and increase in fluids.  Patient denies fever, shortness of breath, chest pain, rhinorrhea, sore throat, lightheadedness, constipation or rash.    Past Medical History:   Diagnosis Date    Allergic rhinitis     No Comments Provided    Celiac disease     No Comments Provided    Mass of right index finger at the PIP joint 2018    S/P excision of mass right index finger 2018    Uncomplicated asthma     No Comments Provided     Past Surgical History:   Procedure Laterality Date     SECTION      ,4/10/14, Section    COLONOSCOPY      age 10    ESOPHAGOSCOPY, GASTROSCOPY, DUODENOSCOPY (EGD), COMBINED      [id number],ESOPHAGOGASTRODUODENOSCOPY,age 10, small bowel bx: celiac disease    EXCISE MASS UPPER EXTREMITY Right 2018    Procedure: EXCISE MASS UPPER EXTREMITY;  Right Index Finger Mass Excision;  Surgeon: Nate Allen DO;  Location: [facility] OR    LAPAROSCOPIC CHOLECYSTECTOMY      9/22/15,Cholecystectomy,Laparoscopic    S/P EXCISION OF MASS RIGHT INDEX FINGER Right 2018     Social History     Tobacco Use    Smoking status: Never     Passive exposure: Current    Smokeless tobacco: Never    Tobacco comments:     Quit smoking: passive exposure   Substance Use Topics    Alcohol use: Yes     Comment: social - one per month     Current Outpatient Medications   Medication Sig Dispense Refill    albuterol (PROAIR HFA/PROVENTIL HFA/VENTOLIN HFA) 108 (90 Base) MCG/ACT inhaler Inhale 2 puffs into the lungs every 4 hours as needed for wheezing 18 g 11    buPROPion (WELLBUTRIN XL) 300 MG 24 hr tablet Take 300 mg by mouth every morning      cyclobenzaprine (FLEXERIL) 10 MG tablet TAKE 1 TABLET BY MOUTH 3 TIMES DAILY IF NEEDED FOR MUSCLE SPASM. 30 tablet 4    EPINEPHrine (ANY BX GENERIC EQUIV) 0.3  "MG/0.3ML injection 2-pack Inject 0.3 mLs (0.3 mg) into the muscle as needed for anaphylaxis 2 each 11    fluticasone (FLOVENT HFA) 110 MCG/ACT inhaler Inhale 1 puff into the lungs 2 times daily 12 g 11    ibuprofen (ADVIL/MOTRIN) 800 MG tablet TAKE 1 TABLET BY MOUTH 3 TIMES DAILY IF NEEDED FOR PAIN. 180 tablet 0    LORazepam (ATIVAN) 0.5 MG tablet TAKE 1 TABLET (0.5 MG) BY MOUTH 3 TIMES WEEKLY AS NEEDED FOR PANIC ATTACKS MUST LAST 30 DAYS      LORazepam (ATIVAN) 1 MG tablet Take 1 mg by mouth every 6 hours as needed for anxiety      medical cannabis (Patient's own supply) See Admin Instructions (The purpose of this order is to document that the patient reports taking medical cannabis.  This is not a prescription, and is not used to certify that the patient has a qualifying medical condition.)      MONO-LINYAH 0.25-35 MG-MCG tablet TAKE 1 TABLET BY MOUTH EVERY DAY *DUE FOR ANNUAL VISIT WITH PCP FOR FUTURE REFILLS* 84 tablet 11    risperiDONE (RISPERDAL) 2 MG tablet TAKE 1 TABLET (2MG) BY MOUTH IN THE MORNING. TAKE 1/2 TABLET (1MG) IN THE EVENING AS NEEDED.      zolpidem ER (AMBIEN CR) 12.5 MG CR tablet TAKE 1 TABLET (12.5MG) BY MOUTH AT BEDTIME       Allergies   Allergen Reactions    Amoxicillin-Pot Clavulanate Diarrhea     Yeast Infection in colon    Amoxicillin-Pot Clavulanate Diarrhea     Cannot have due to wheat    Azithromycin Nausea    Azithromycin      Can't take because it contains wheat    Codeine      constipation    Gluten     Gluten Meal      Other reaction(s): Constipation  Celiac disease, abdominal pain    Food Diarrhea     Other reaction(s): Constipation  Wheat, rye, barley     Past medical history, past surgical history, current medications and allergies reviewed and accurate to the best of my knowledge.      ROS:  Refer to HPI    BP (!) 162/98 (BP Location: Left arm, Patient Position: Chair, Cuff Size: Adult Regular)   Pulse 92   Temp 99  F (37.2  C) (Tympanic)   Resp 20   Ht 1.575 m (5' 2\")   " Wt 75.8 kg (167 lb)   LMP 09/16/2024 (Approximate)   SpO2 97%   BMI 30.54 kg/m      EXAM:  General Appearance: Well appearing 34 year old female, appropriate appearance for age. No acute distress   Ears: Left TM intact, translucent with bony landmarks appreciated, no erythema, no effusion, no bulging, no purulence.  Right TM intact, translucent with bony landmarks appreciated, + erythema, no effusion, + bulging, no purulence.  Left auditory canal clear.  Right auditory canal clear.  Normal external ears, non tender.  Eyes: conjunctivae normal without erythema or irritation, corneas clear, no drainage or crusting, no eyelid swelling, pupils equal   Oropharynx: moist mucous membranes, posterior pharynx with mild erythema, tonsils symmetric and 2+, no erythema, no exudates or petechiae, no post nasal drip seen, no trismus, voice clear.    Sinuses:  Sinus tenderness upon palpation of the frontal and maxillary sinuses  Nose:  Bilateral nares: no erythema, no edema, + drainage and + congestion   Neck: supple without adenopathy  Respiratory: normal chest wall and respirations.  Normal effort.  Clear to auscultation bilaterally, no wheezing, crackles or rhonchi.  No increased work of breathing.  No cough appreciated.  Cardiac: RRR with no murmurs  Abdomen: soft, nontender, no rigidity, no rebound tenderness or guarding, normal bowel sounds present  Musculoskeletal:  Equal movement of bilateral upper extremities.  Equal movement of bilateral lower extremities.  Normal gait.    Neuro: Alert and oriented to person, place, and time.   Psychological: normal affect, alert, oriented, and pleasant.     Labs:  Results for orders placed or performed in visit on 09/25/24   Symptomatic Influenza A/B, RSV, & SARS-CoV2 PCR (COVID-19) Nose     Status: Abnormal    Specimen: Nose; Swab   Result Value Ref Range    Influenza A PCR Negative Negative    Influenza B PCR Negative Negative    RSV PCR Negative Negative    SARS CoV2 PCR Positive  (A) Negative    Narrative    Testing was performed using the Xpert Xpress CoV2/Flu/RSV Assay on the [email or web address] GeneXpert Instrument. This test should be ordered for the detection of SARS-CoV2, influenza, and RSV viruses in individuals with signs and symptoms of respiratory tract infection. This test is for in vitro diagnostic use under the [place] FDA for laboratories certified under CLIA to perform high or moderate complexity testing. This test has been [place] FDA cleared. A negative result does not rule out the presence of PCR inhibitors in the specimen or target RNA in concentration below the limit of detection for the assay. If only one viral target is positive but coinfection with multiple targets is suspected, the sample should be re-tested with another FDA cleared, approved, or authorized test, if coninfection would change clinical management. This test was validated by the [facility] [employer]. These laboratories are certified under the Clinical Laboratory Improvement Amendments of 1988 (CLIA-88) as qualified to perfom high complexity laboratory testing.

## 2024-09-25 NOTE — TELEPHONE ENCOUNTER
Pt called stating that she is positive for Covid and that she would like a note for work.  Michelle Trujillo on 9/25/2024 at 5:58 PM

## 2024-09-25 NOTE — NURSING NOTE
"Chief Complaint   Patient presents with    Fever     Fever, Body Aches, Diarrhea x 2 Days       Initial BP (!) 158/98 (BP Location: Left arm, Patient Position: Chair, Cuff Size: Adult Regular)   Pulse 92   Temp 99  F (37.2  C) (Tympanic)   Resp 20   Ht 1.575 m (5' 2\")   Wt 75.8 kg (167 lb)   LMP 09/16/2024 (Approximate)   SpO2 97%   BMI 30.54 kg/m   Estimated body mass index is 30.54 kg/m  as calculated from the following:    Height as of this encounter: 1.575 m (5' 2\").    Weight as of this encounter: 75.8 kg (167 lb).      Medication Reconciliation: Complete.       Nini Dale LPN on 9/25/2024 at 4:21 PM     "

## 2024-09-25 NOTE — LETTER
September 25, 2024      Ita Poole  [address] [place] [id number]        To Whom It May Concern:    Ita Poole was seen on 9/25/24.  Please excuse her until 9/27/24 due to illness.        Sincerely,        OLVIN Garcia CNP

## 2024-09-25 NOTE — PATIENT INSTRUCTIONS
Coronavirus (COVID-19): Care Instructions  What is COVID-19?  COVID-19 is a disease caused by a type of coronavirus. This illness was first found in 2019 and has since spread worldwide (pandemic). Symptoms can range from mild, such as fever and body aches, to severe, including trouble breathing. COVID-19 can be deadly.  Coronaviruses are a large group of viruses. Some types cause the common cold. Others cause more serious illnesses like [place] East respiratory syndrome (MERS) and severe acute respiratory syndrome (SARS).  Follow-up care is a key part of your treatment and safety. Be sure to make and go to all appointments, and call your doctor if you are having problems. It's also a good idea to know your test results and keep a list of the medicines you take.  How can you self-isolate when you have COVID-19?  If you have COVID-19, there are things you can do to help avoid spreading the virus to others.  Stay home, and avoid contact with other people.  Limit contact with people in your home. If possible, stay in a separate bedroom and use a separate bathroom.  Wear a high-quality mask when you are around other people.  Improve airflow. If you have to spend time indoors with others, open windows and doors. Or you can use a fan to blow air away from people and out a window.  Avoid contact with pets and other animals.  Cover your mouth and nose with a tissue when you cough or sneeze. Then throw it in the trash right away.  Wash your hands often, especially after you cough or sneeze. Use soap and water, and scrub for at least 20 seconds. If soap and water aren't available, use an alcohol-based hand .  Don't share personal household items. These include bedding, towels, cups and glasses, and eating utensils.  Wash laundry in the warmest water allowed for the fabric type, and dry it completely. It's okay to wash other people's laundry with yours.  Clean and disinfect your home. Use household  and  disinfectant wipes or sprays.  Go to the CDC website at cdc.gov if you have questions.  When can you end self-isolation for COVID-19?  If you know or think that you have the virus, you will need to self-isolate. When you can be around other people you live with and leave home depends on whether you have symptoms. Important: Day 0 is the day your symptoms started or the day you tested positive. Day 1 is the day after your symptoms first started or your test was positive.  If you tested positive but had no symptoms, it's safe to end isolation at the end of Day 5. But if you start to have symptoms, follow the recommendations below, and count your first day of symptoms as Day 0.  If you have symptoms, when you can end isolation depends on how sick you were and your overall health. No matter what, you need to wait until your symptoms are getting better and you haven't had a fever for 24 hours while not taking medicines to lower the fever. Here's how long to isolate, based on your symptoms:  If you were only a little sick: (This means you might have felt really bad but had no shortness of breath and never needed to be in the hospital.) You can end isolation at the end of Day 5.  If you were more sick: (You had some shortness of breath or some trouble breathing but never needed to be in the hospital.) You can end isolation at the end of Day 10.  If you were very sick and needed to be in the hospital, or if you have a weakened immune system: You can end isolation at the end of Day 10 or later. Talk to your doctor to find out when it's safe to end isolation. You may need a viral test.  After you end isolation, if your symptoms come back or get worse: Restart your isolation at Day 0. Do this even if it happens after you took medicine for COVID.  Avoid travel and stay away from people at high risk for serious disease for at least 10 days.  Those who can't wear a mask because they are under 2 years old or have certain  "disabilities should isolate for at least 10 full days.  Call your doctor or seek care if you have questions about your symptoms or when to end isolation.  Go to the CDC website at cdc.gov if you have questions.  Where can you learn more?  Go to [email or web address]://[email or web address].net/patiented  Enter C007 in the search box to learn more about \"Coronavirus (COVID-19): Care Instructions.\"  Current as of: May 28, 2024  Content Version: 14.1 2006-2024 [employer].   Care instructions adapted under license by your healthcare professional. If you have questions about a medical condition or this instruction, always ask your healthcare professional. [employer] disclaims any warranty or liability for your use of this information.    "

## 2024-09-25 NOTE — TELEPHONE ENCOUNTER
Please let patient know that a new note has been placed in her chart for her that we will excuse her from work until October 1.

## 2024-09-25 NOTE — LETTER
September 25, 2024      Ita Poole  [address] [place] [id number]        To Whom It May Concern:    Ita Poole was seen on 9/25/24.  Please excuse her until 10/01/24 due to illness (COVID +).        Sincerely,        OLVIN Garcia CNP

## 2024-10-17 DIAGNOSIS — S30.0XXA CONTUSION, BUTTOCK, INITIAL ENCOUNTER: ICD-10-CM

## 2024-10-17 DIAGNOSIS — M79.18 RIGHT BUTTOCK PAIN: ICD-10-CM

## 2024-10-23 RX ORDER — IBUPROFEN 800 MG/1
TABLET, FILM COATED ORAL
Qty: 180 TABLET | Refills: 0 | OUTPATIENT
Start: 2024-10-23

## 2024-10-23 NOTE — TELEPHONE ENCOUNTER
[id number] ([employer] of [place] sent Rx request for the following:      Requested Prescriptions   Pending Prescriptions Disp Refills    ibuprofen (ADVIL/MOTRIN) 800 MG tablet [Pharmacy Med Name: IBUPROFEN 800MG TABLET] 180 tablet 0     Sig: TAKE 1 TABLET BY MOUTH 3 TIMES DAILY IF NEEDED FOR PAIN.       NSAID Medications Failed - 10/23/2024 10:30 AM        Failed - Most recent blood pressure under 140/90 in past 12 months     BP Readings from Last 3 Encounters:   09/25/24 (!) 162/98   02/09/24 128/82   01/15/24 121/70   No data recorded        Failed - Always Fail Criteria - Chart Review Required     Last Prescription Date:   10/13/23  Last Fill Qty/Refills:         180, R-0    Right buttock pain [M79.18]      Contusion, buttock, initial encounter [S30.0XXA]        Last Office Visit:              10/31/23 (Physical)  Future Office visit:           None    Pt due for annual exam after 10/31/24. Routing to provider for refill consideration. Routing to Unit scheduling pool, to assist Pt in scheduling appointment. Unable to complete prescription refill per RN Medication Refill Policy. Routing to covering provider for refill consideration, as PCP/provider is out of clinic >48 hours or Pt is completely out of medication and provider is out of the clinic today.Lidia Fernandez RN .............. 10/23/2024  10:35 AM

## 2024-10-23 NOTE — TELEPHONE ENCOUNTER
Patient is going to take OTC meds for now.  She is trying to figure out her health insurance at this time  Amy Savage on 10/23/2024 at 11:08 AM

## 2024-11-13 ENCOUNTER — OFFICE VISIT (OUTPATIENT)
Dept: FAMILY MEDICINE | Facility: OTHER | Age: 34
End: 2024-11-13
Payer: COMMERCIAL

## 2024-11-13 ENCOUNTER — HOSPITAL ENCOUNTER (OUTPATIENT)
Dept: GENERAL RADIOLOGY | Facility: OTHER | Age: 34
Discharge: HOME | End: 2024-11-13
Payer: COMMERCIAL

## 2024-11-13 VITALS
SYSTOLIC BLOOD PRESSURE: 122 MMHG | BODY MASS INDEX: 28.88 KG/M2 | RESPIRATION RATE: 19 BRPM | HEIGHT: 63 IN | TEMPERATURE: 97.6 F | WEIGHT: 163 LBS | HEART RATE: 92 BPM | DIASTOLIC BLOOD PRESSURE: 82 MMHG

## 2024-11-13 DIAGNOSIS — M25.562 CHRONIC PAIN OF LEFT KNEE: Primary | ICD-10-CM

## 2024-11-13 DIAGNOSIS — G89.29 CHRONIC PAIN OF LEFT KNEE: Primary | ICD-10-CM

## 2024-11-13 PROCEDURE — 73562 X-RAY EXAM OF KNEE 3: CPT | Mod: LT

## 2024-11-13 ASSESSMENT — ASTHMA QUESTIONNAIRES
ACT_TOTALSCORE: 25
QUESTION_2 LAST FOUR WEEKS HOW OFTEN HAVE YOU HAD SHORTNESS OF BREATH: NOT AT ALL
QUESTION_3 LAST FOUR WEEKS HOW OFTEN DID YOUR ASTHMA SYMPTOMS (WHEEZING, COUGHING, SHORTNESS OF BREATH, CHEST TIGHTNESS OR PAIN) WAKE YOU UP AT NIGHT OR EARLIER THAN USUAL IN THE MORNING: NOT AT ALL
QUESTION_4 LAST FOUR WEEKS HOW OFTEN HAVE YOU USED YOUR RESCUE INHALER OR NEBULIZER MEDICATION (SUCH AS ALBUTEROL): NOT AT ALL
QUESTION_1 LAST FOUR WEEKS HOW MUCH OF THE TIME DID YOUR ASTHMA KEEP YOU FROM GETTING AS MUCH DONE AT WORK, SCHOOL OR AT HOME: NONE OF THE TIME
QUESTION_5 LAST FOUR WEEKS HOW WOULD YOU RATE YOUR ASTHMA CONTROL: COMPLETELY CONTROLLED
ACT_TOTALSCORE: 25

## 2024-11-13 ASSESSMENT — PAIN SCALES - GENERAL: PAINLEVEL_OUTOF10: SEVERE PAIN (7)

## 2024-11-13 NOTE — NURSING NOTE
"Chief Complaint   Patient presents with    Knee Pain     left     Patient here for left knee pain.    Initial /82   Pulse 92   Temp 97.6  F (36.4  C) (Tympanic)   Resp 19   Ht 1.589 m (5' 2.54\")   Wt 73.9 kg (163 lb)   LMP 11/04/2024 (Approximate)   BMI 29.30 kg/m   Estimated body mass index is 29.3 kg/m  as calculated from the following:    Height as of this encounter: 1.589 m (5' 2.54\").    Weight as of this encounter: 73.9 kg (163 lb).  Medication Review: complete    The next two questions are to help us understand your food security.  If you are feeling you need any assistance in this area, we have resources available to support you today.          10/31/2023   SDOH- Food Insecurity   Within the past 12 months, did you worry that your food would run out before you got money to buy more? N    Within the past 12 months, did the food you bought just not last and you didn t have money to get more? N        Patient-reported         Health Care Directive:  Patient does not have a Health Care Directive: Discussed advance care planning with patient; however, patient declined at this time.    Mara Barrios LPN      "

## 2024-11-13 NOTE — PROGRESS NOTES
ASSESSMENT/PLAN:    I have reviewed the nursing notes.  I have reviewed the findings, diagnosis, plan and need for follow up with the patient.    1. Chronic pain of left knee (Primary)    - XR Knee Left 3 Views- No acute fracture or dislocation is identified. No suspicious osseous lesion. The joint spaces are preserved. No substantial joint effusion.  No foreign body or subcutaneous emphysema per radiologist.    - MR Knee Left w/o Contrast; Future    - Please read the attached information on knee pain or injury for at home care treatment as discussed in the clinic today.    - May use over-the-counter Tylenol and ibuprofen as needed for pain, inflammation or fever    - Discussed warning signs/symptoms indicative of need to f/u    - Follow up if symptoms persist or worsen or concerns    - I explained my diagnostic considerations and recommendations to the patient, who voiced understanding and agreement with the treatment plan. All questions were answered. We discussed potential side effects of any prescribed or recommended therapies, as well as expectations for response to treatments.    OLVIN Garcia CNP  11/13/2024  1:51 PM    HPI:    Ita Poole is a 34 year old female who presents to [facility] today for concerns of left knee pain.  Patient states that in high school she had a torn MCL in her left knee.  Patient does not recall any new injuries other than normal wear and tear she states.  Patient states that all of her life her jobs have consisted of being on her feet a lot and is having difficulty kneeling down on her knees.  Patient is a  here at the hospital since April 2023 and states that her left knee pain has worsened since this job.  Patient also states that in 2021 she was sent to physical therapy by her provider at that time due to her left knee clicking and catching, states that therapy did not help.  At home care treatment consists of alternating Tylenol ibuprofen, ice and ice wraps  with little relief.  Patient denies fever, chills, shortness of breath, chest pain, cough, congestion, rhinorrhea, sore throat, headache, lightheadedness, dizziness, nausea, vomiting, diarrhea, constipation, otalgia or rash.    Past Medical History:   Diagnosis Date    Allergic rhinitis     No Comments Provided    Celiac disease     No Comments Provided    Mass of right index finger at the PIP joint 2018    S/P excision of mass right index finger 2018    Uncomplicated asthma     No Comments Provided     Past Surgical History:   Procedure Laterality Date     SECTION      ,4/10/14, Section    COLONOSCOPY      age 10    ESOPHAGOSCOPY, GASTROSCOPY, DUODENOSCOPY (EGD), COMBINED      [id number],ESOPHAGOGASTRODUODENOSCOPY,age 10, small bowel bx: celiac disease    EXCISE MASS UPPER EXTREMITY Right 2018    Procedure: EXCISE MASS UPPER EXTREMITY;  Right Index Finger Mass Excision;  Surgeon: Nate Allen DO;  Location: [facility] OR    LAPAROSCOPIC CHOLECYSTECTOMY      9/22/15,Cholecystectomy,Laparoscopic    S/P EXCISION OF MASS RIGHT INDEX FINGER Right 2018     Social History     Tobacco Use    Smoking status: Never     Passive exposure: Current    Smokeless tobacco: Never    Tobacco comments:     Quit smoking: passive exposure   Substance Use Topics    Alcohol use: Yes     Comment: social - one per month     Current Outpatient Medications   Medication Sig Dispense Refill    albuterol (PROAIR HFA/PROVENTIL HFA/VENTOLIN HFA) 108 (90 Base) MCG/ACT inhaler Inhale 2 puffs into the lungs every 4 hours as needed for wheezing 18 g 11    buPROPion (WELLBUTRIN XL) 300 MG 24 hr tablet Take 300 mg by mouth every morning      cyclobenzaprine (FLEXERIL) 10 MG tablet TAKE 1 TABLET BY MOUTH 3 TIMES DAILY IF NEEDED FOR MUSCLE SPASM. 30 tablet 4    EPINEPHrine (ANY BX GENERIC EQUIV) 0.3 MG/0.3ML injection 2-pack Inject 0.3 mLs (0.3 mg) into the muscle as needed for anaphylaxis 2 each 11    fluticasone (FLOVENT  "HFA) 110 MCG/ACT inhaler Inhale 1 puff into the lungs 2 times daily 12 g 11    ibuprofen (ADVIL/MOTRIN) 800 MG tablet TAKE 1 TABLET BY MOUTH 3 TIMES DAILY IF NEEDED FOR PAIN. 180 tablet 0    LORazepam (ATIVAN) 0.5 MG tablet TAKE 1 TABLET (0.5 MG) BY MOUTH 3 TIMES WEEKLY AS NEEDED FOR PANIC ATTACKS MUST LAST 30 DAYS      LORazepam (ATIVAN) 1 MG tablet Take 1 mg by mouth every 6 hours as needed for anxiety      medical cannabis (Patient's own supply) See Admin Instructions (The purpose of this order is to document that the patient reports taking medical cannabis.  This is not a prescription, and is not used to certify that the patient has a qualifying medical condition.)      MONO-LINYAH 0.25-35 MG-MCG tablet TAKE 1 TABLET BY MOUTH EVERY DAY *DUE FOR ANNUAL VISIT WITH PCP FOR FUTURE REFILLS* 84 tablet 11    predniSONE (DELTASONE) 20 MG tablet Take 3 tabs by mouth daily x 3 days, then 2 tabs daily x 3 days, then 1 tab daily x 3 days, then 1/2 tab daily x 3 days. 20 tablet 0    risperiDONE (RISPERDAL) 2 MG tablet TAKE 1 TABLET (2MG) BY MOUTH IN THE MORNING. TAKE 1/2 TABLET (1MG) IN THE EVENING AS NEEDED.      zolpidem ER (AMBIEN CR) 12.5 MG CR tablet TAKE 1 TABLET (12.5MG) BY MOUTH AT BEDTIME       Allergies   Allergen Reactions    Amoxicillin-Pot Clavulanate Diarrhea     Yeast Infection in colon    Amoxicillin-Pot Clavulanate Diarrhea     Cannot have due to wheat    Azithromycin Nausea    Azithromycin      Can't take because it contains wheat    Codeine      constipation    Gluten     Gluten Meal      Other reaction(s): Constipation  Celiac disease, abdominal pain    Food Diarrhea     Other reaction(s): Constipation  Wheat, rye, barley     Past medical history, past surgical history, current medications and allergies reviewed and accurate to the best of my knowledge.      ROS:  Refer to HPI    /82   Pulse 92   Temp 97.6  F (36.4  C) (Tympanic)   Resp 19   Ht 1.589 m (5' 2.54\")   Wt 73.9 kg (163 lb)   LMP " 11/04/2024 (Approximate)   BMI 29.30 kg/m      EXAM:  General Appearance: Well appearing 34 year old female, appropriate appearance for age. No acute distress   Respiratory: normal chest wall and respirations.  Normal effort.  Clear to auscultation bilaterally, no wheezing, crackles or rhonchi.  No increased work of breathing.  No cough appreciated.  Cardiac: RRR with no murmurs  Musculoskeletal:  Equal movement of bilateral upper extremities.  Equal movement of bilateral lower extremities.  Limping gait.  Negative Lachman's and anterior drawer test.  No edema or ecchymosis noted.  Pain upon palpation back of knee.  CMS grossly intact.  Neuro: Alert and oriented to person, place, and time.    Psychological: normal affect, alert, oriented, and pleasant.     Xray:  Results for orders placed or performed in visit on 11/13/24   XR Knee Left 3 Views     Status: None    Narrative    PROCEDURE:  XR KNEE LEFT 3 VIEWS    HISTORY: Chronic pain of left knee; Chronic pain of left knee    COMPARISON: Right knee radiographs 5/5/2021    TECHNIQUE:  XR KNEE LEFT 3 VIEWS    FINDINGS:    No acute fracture or dislocation is identified. No suspicious osseous  lesion. The joint spaces are preserved. No substantial joint effusion.    No foreign body or subcutaneous emphysema.        Impression    IMPRESSION:    No acute osseous abnormality.    TERA SALINAS MD         SYSTEM ID:  [id number]

## 2024-11-25 ENCOUNTER — HOSPITAL ENCOUNTER (OUTPATIENT)
Dept: MRI IMAGING | Facility: OTHER | Age: 34
Discharge: HOME | End: 2024-11-25
Payer: COMMERCIAL

## 2024-11-25 DIAGNOSIS — G89.29 CHRONIC PAIN OF LEFT KNEE: ICD-10-CM

## 2024-11-25 DIAGNOSIS — M25.562 CHRONIC PAIN OF LEFT KNEE: ICD-10-CM

## 2024-11-25 PROCEDURE — 73721 MRI JNT OF LWR EXTRE W/O DYE: CPT | Mod: LT

## 2024-12-21 ENCOUNTER — HEALTH MAINTENANCE LETTER (OUTPATIENT)
Age: 34
End: 2024-12-21

## 2025-01-06 ENCOUNTER — HOSPITAL ENCOUNTER (OUTPATIENT)
Dept: GENERAL RADIOLOGY | Facility: OTHER | Age: 35
Discharge: HOME | End: 2025-01-06
Attending: NURSE PRACTITIONER
Payer: COMMERCIAL

## 2025-01-06 ENCOUNTER — OFFICE VISIT (OUTPATIENT)
Dept: FAMILY MEDICINE | Facility: OTHER | Age: 35
End: 2025-01-06
Attending: STUDENT IN AN ORGANIZED HEALTH CARE EDUCATION/TRAINING PROGRAM
Payer: COMMERCIAL

## 2025-01-06 VITALS
SYSTOLIC BLOOD PRESSURE: 130 MMHG | RESPIRATION RATE: 18 BRPM | OXYGEN SATURATION: 98 % | HEART RATE: 102 BPM | BODY MASS INDEX: 28.11 KG/M2 | WEIGHT: 156.4 LBS | TEMPERATURE: 98.6 F | DIASTOLIC BLOOD PRESSURE: 74 MMHG

## 2025-01-06 DIAGNOSIS — S69.91XA INJURY OF RIGHT LITTLE FINGER, INITIAL ENCOUNTER: ICD-10-CM

## 2025-01-06 DIAGNOSIS — S63.636A SPRAIN OF INTERPHALANGEAL JOINT OF RIGHT LITTLE FINGER, INITIAL ENCOUNTER: Primary | ICD-10-CM

## 2025-01-06 DIAGNOSIS — M79.644 FINGER PAIN, RIGHT: ICD-10-CM

## 2025-01-06 PROCEDURE — 99213 OFFICE O/P EST LOW 20 MIN: CPT | Performed by: NURSE PRACTITIONER

## 2025-01-06 PROCEDURE — 73140 X-RAY EXAM OF FINGER(S): CPT | Mod: RT

## 2025-01-06 RX ORDER — DESVENLAFAXINE 25 MG/1
25 TABLET, EXTENDED RELEASE ORAL EVERY MORNING
COMMUNITY
Start: 2024-04-25

## 2025-01-06 ASSESSMENT — PAIN SCALES - GENERAL: PAINLEVEL_OUTOF10: MODERATE PAIN (5)

## 2025-01-06 NOTE — PROGRESS NOTES
"ASSESSMENT/PLAN:     I have reviewed the nursing notes.  I have reviewed the findings, diagnosis, plan and need for follow up with the patient.        1. Injury of right little finger, initial encounter  2. Finger pain, right  - XR Finger Right G/E 2 Views    3. Sprain of interphalangeal joint of right little finger, initial encounter (Primary)  Xray of right little finger completed and personally reviewed without appreciated abnormality, radiologist over read:  No fracture or dislocation is identified. The joint spaces are preserved. No foreign body is seen.   Dicussed xray results with patient and that she likely has a tendon injury.  Recommended referral to orthopedics - patient declines at this time.  Recommend gentle ROM activities, icing and may use over-the-counter Tylenol or ibuprofen PRN  Follow up if symptoms persist or worsen or concerns      I explained my diagnostic considerations and recommendations to the patient, who voiced understanding and agreement with the treatment plan. All questions were answered. We discussed potential side effects of any prescribed or recommended therapies, as well as expectations for response to treatments.    Negra Arvizu NP  [facility]      SUBJECTIVE:   Ita Poole is a 34 year old female who presents to clinic today for the following health issues:  Finger pain    HPI  Patient reports persisting right pinky finger pain for the past month after getting it pinched between a table and chair.  She reports her  \"reset it \"at the time of injury but has noted persisting pain and deformity.  She is slowly able to increase her movement of the finger but has not returned to normal.  No numbness or tingling.    She has been icing and taking ibuprofen and Tylenol.        Past Medical History:   Diagnosis Date    Allergic rhinitis     No Comments Provided    Celiac disease     No Comments Provided    Mass of right index finger at the PIP joint " 2018    S/P excision of mass right index finger 2018    Uncomplicated asthma     No Comments Provided     Past Surgical History:   Procedure Laterality Date     SECTION      ,4/10/14, Section    COLONOSCOPY      age 10    ESOPHAGOSCOPY, GASTROSCOPY, DUODENOSCOPY (EGD), COMBINED      [id number],ESOPHAGOGASTRODUODENOSCOPY,age 10, small bowel bx: celiac disease    EXCISE MASS UPPER EXTREMITY Right 2018    Procedure: EXCISE MASS UPPER EXTREMITY;  Right Index Finger Mass Excision;  Surgeon: Nate Allen DO;  Location: [facility] OR    LAPAROSCOPIC CHOLECYSTECTOMY      9/22/15,Cholecystectomy,Laparoscopic    S/P EXCISION OF MASS RIGHT INDEX FINGER Right 2018     Social History     Tobacco Use    Smoking status: Never     Passive exposure: Current    Smokeless tobacco: Never    Tobacco comments:     Quit smoking: passive exposure   Substance Use Topics    Alcohol use: Yes     Comment: social - one per month     Current Outpatient Medications   Medication Sig Dispense Refill    albuterol (PROAIR HFA/PROVENTIL HFA/VENTOLIN HFA) 108 (90 Base) MCG/ACT inhaler Inhale 2 puffs into the lungs every 4 hours as needed for wheezing 18 g 11    buPROPion (WELLBUTRIN XL) 300 MG 24 hr tablet Take 300 mg by mouth every morning      cyclobenzaprine (FLEXERIL) 10 MG tablet TAKE 1 TABLET BY MOUTH 3 TIMES DAILY IF NEEDED FOR MUSCLE SPASM. 30 tablet 4    desvenlafaxine succinate (PRISTIQ) 25 MG 24 hr tablet Take 25 mg by mouth every morning.      EPINEPHrine (ANY BX GENERIC EQUIV) 0.3 MG/0.3ML injection 2-pack Inject 0.3 mLs (0.3 mg) into the muscle as needed for anaphylaxis 2 each 11    fluticasone (FLOVENT HFA) 110 MCG/ACT inhaler Inhale 1 puff into the lungs 2 times daily 12 g 11    LORazepam (ATIVAN) 0.5 MG tablet TAKE 1 TABLET (0.5 MG) BY MOUTH 3 TIMES WEEKLY AS NEEDED FOR PANIC ATTACKS MUST LAST 30 DAYS      LORazepam (ATIVAN) 1 MG tablet Take 1 mg by mouth every 6 hours as needed for anxiety      medical  cannabis (Patient's own supply) See Admin Instructions (The purpose of this order is to document that the patient reports taking medical cannabis.  This is not a prescription, and is not used to certify that the patient has a qualifying medical condition.)      zolpidem ER (AMBIEN CR) 12.5 MG CR tablet TAKE 1 TABLET (12.5MG) BY MOUTH AT BEDTIME      ibuprofen (ADVIL/MOTRIN) 800 MG tablet TAKE 1 TABLET BY MOUTH 3 TIMES DAILY IF NEEDED FOR PAIN. 180 tablet 0    MONO-LINYAH 0.25-35 MG-MCG tablet TAKE 1 TABLET BY MOUTH EVERY DAY *DUE FOR ANNUAL VISIT WITH PCP FOR FUTURE REFILLS* 84 tablet 11    predniSONE (DELTASONE) 20 MG tablet Take 3 tabs by mouth daily x 3 days, then 2 tabs daily x 3 days, then 1 tab daily x 3 days, then 1/2 tab daily x 3 days. 20 tablet 0    risperiDONE (RISPERDAL) 2 MG tablet TAKE 1 TABLET (2MG) BY MOUTH IN THE MORNING. TAKE 1/2 TABLET (1MG) IN THE EVENING AS NEEDED.       Allergies   Allergen Reactions    Amoxicillin-Pot Clavulanate Diarrhea     Yeast Infection in colon    Amoxicillin-Pot Clavulanate Diarrhea     Cannot have due to wheat    Azithromycin Nausea    Azithromycin      Can't take because it contains wheat    Codeine      constipation    Gluten     Gluten Meal      Other reaction(s): Constipation  Celiac disease, abdominal pain    Food Diarrhea     Other reaction(s): Constipation  Wheat, rye, barley         Past medical history, past surgical history, current medications and allergies reviewed and accurate to the best of my knowledge.        OBJECTIVE:     /74   Pulse 102   Temp 98.6  F (37  C) (Tympanic)   Resp 18   Wt 70.9 kg (156 lb 6.4 oz)   LMP 12/11/2024 (Approximate)   SpO2 98%   BMI 28.11 kg/m    Body mass index is 28.11 kg/m .        Physical Exam  General Appearance: Well appearing adult female, appropriate appearance for age. No acute distress  Cardiac:  CMS intact to right upper extremity with brisk capillary refill   Musculoskeletal:  right little finger with  mild deformity noted of the PIP joint with slightly angulation and decreased flexion and extension.    Dermatological: skin intact to right little finger without erythema or bruising   Psychological: normal affect, alert, oriented, and pleasant.     Imaging:  Results for orders placed or performed in visit on 01/06/25   XR Finger Right G/E 2 Views     Status: None    Narrative    PROCEDURE:  XR FINGER RIGHT G/E 2 VIEWS    HISTORY: Injury of right little finger, initial encounter; Finger  pain, right.    COMPARISON:  None.    TECHNIQUE:  3 views right fifth finger.    FINDINGS:  No fracture or dislocation is identified. The joint spaces  are preserved. No foreign body is seen.       Impression    IMPRESSION: No acute fracture.      KT LANDAVERDE MD         SYSTEM ID:  [id number]

## 2025-01-06 NOTE — NURSING NOTE
"Chief Complaint   Patient presents with    Finger     Right pinky pain x1 month     Patient here with daughter for right pinky pain x1 month after getting it pinched between a table leg and chair. States  re-set it at the time of injury. Notes it is still painful to move. Has been icing and treating with ibuprofen and tylenol.     Initial /74   Pulse 102   Temp 98.6  F (37  C) (Tympanic)   Resp 18   Wt 70.9 kg (156 lb 6.4 oz)   LMP 12/11/2024 (Approximate)   SpO2 98%   BMI 28.11 kg/m   Estimated body mass index is 28.11 kg/m  as calculated from the following:    Height as of 11/13/24: 1.589 m (5' 2.54\").    Weight as of this encounter: 70.9 kg (156 lb 6.4 oz).  Medication Review: complete    The next two questions are to help us understand your food security.  If you are feeling you need any assistance in this area, we have resources available to support you today.          1/6/2025   SDOH- Food Insecurity   Within the past 12 months, did you worry that your food would run out before you got money to buy more? N   Within the past 12 months, did the food you bought just not last and you didn t have money to get more? N         Health Care Directive:  Patient does not have a Health Care Directive: Discussed advance care planning with patient; however, patient declined at this time.    Mara Barrios LPN      "

## 2025-01-19 DIAGNOSIS — M62.838 MUSCLE SPASM: ICD-10-CM

## 2025-01-21 RX ORDER — CYCLOBENZAPRINE HCL 10 MG
TABLET ORAL
Qty: 30 TABLET | Refills: 4 | Status: SHIPPED | OUTPATIENT
Start: 2025-01-21

## 2025-01-21 NOTE — TELEPHONE ENCOUNTER
[facility] sent Rx request for the following:      Requested Prescriptions   Pending Prescriptions Disp Refills    cyclobenzaprine (FLEXERIL) 10 MG tablet [Pharmacy Med Name: CYCLOBENZAPRINE 10MG TABLET] 30 tablet 4     Sig: TAKE 1 TABLET BY MOUTH 3 TIMES DAILY IF NEEDED FOR MUSCLE SPASM.       There is no refill protocol information for this order          Last Prescription Date:   8/30/23  Last Fill Qty/Refills:         30, R-4    Last Office Visit:              10/31/23 (Edgardo)  Future Office visit:            none    Pt due for physical and establish care. Routing to provider for refill consideration. Routing to Unit scheduling pool, to assist Pt in scheduling appointment.     Criss Red RN on 1/21/2025 at 4:01 PM

## 2025-02-26 NOTE — TELEPHONE ENCOUNTER
[id number] ([facility]) of [facility] sent Rx request for the following:      Requested Prescriptions   Pending Prescriptions Disp Refills    ibuprofen (ADVIL/MOTRIN) 800 MG tablet       Sig: Take 1 tablet (800 mg) by mouth 3 times daily as needed for moderate pain.       NSAID Medications Failed - 2/26/2025  3:02 PM        Failed - Normal CBC on file in past 12 months     Recent Labs   Lab Test 10/31/23  0848   WBC 9.0   RBC 4.31   HGB 13.4   HCT 39.3              Failed - Medication is active on med list and the sig matches. RN to manually verify dose and sig if red X/fail.     If the protocol passes (green check), you do not need to verify med dose and sig.    A prescription matches if they are the same clinical intention.    For Example: once daily and every morning are the same.    For all fails (red x), verify dose and sig.    If the refill does match what is on file, the RN can still proceed to approve the refill request.     If they do not match, route to the appropriate provider.        Failed - Always Fail Criteria - Chart Review Required     Validate Diagnosis. If the medication is requested for an acute flare of a chronic pain associated with a musculoskeletal or rheumatologic condition; okay to authorize if all other criteria are met. If not, then forward to provider for review.          Failed - Normal GFR on file in past 12 months     Recent Labs   Lab Test 10/31/23  0848 07/06/22  1359 03/17/21  1344   GFRESTIMATED 76   < > 66   GFRESTBLACK  --   --  80    < > = values in this interval not displayed.           Failed - Recent (12 mo) or future (90 days) visit within the authorizing provider's specialty     Last Prescription Date:     IBUPROFEN 200 MG OR CAPS (Discontinued) -- --  2/19/2018 --   Sig - Route: 2 tablets PRN pain - Oral   Class: Historical   Ibuprofen requested differently than last historically reported.    Last Office Visit:       10/31/23 (Px)         Future  Office visit:             Future Appointments 2/26/2025 - 8/25/2025        Date Visit Type Length Department Provider     4/2/2025  4:00 PM PREVENTATIVE ADULT 40 min  FAMILY PRACTICE Judy Lu MD    Location Instructions:     [facility] is located [address]                   Unable to complete prescription refill per RN Medication Refill Policy. Lidia Fernandez RN .............. 2/26/2025  3:10 PM

## 2025-02-28 RX ORDER — IBUPROFEN 800 MG/1
800 TABLET, FILM COATED ORAL 3 TIMES DAILY PRN
Qty: 90 TABLET | Refills: 0 | OUTPATIENT
Start: 2025-02-28

## 2025-02-28 NOTE — TELEPHONE ENCOUNTER
Spoke with Patient regarding refill request - Patient states she did not make request, has not been seen since Dr Reynolds and has an appointment to establish care with new PCP on 04/02/2025. She has already  been using OTC ibuprofen.  Rowan Mojica RN on 2/28/2025 at 2:30 PM

## 2025-02-28 NOTE — TELEPHONE ENCOUNTER
Left message on answering machine to return call to [address] nurse.  Rowan Mojica RN on 2/28/2025 at 1:58 PM

## 2025-03-03 NOTE — TELEPHONE ENCOUNTER
Received 4th request from [facility] for Ibuprofen .  Called and spoke with [employer] and informed of below.  Dalila Townsend RN on 3/3/2025 at 3:40 PM

## 2025-04-04 PROBLEM — Z98.890 HISTORY OF EXCISION OF MASS: Status: RESOLVED | Noted: 2018-02-28 | Resolved: 2025-04-04

## 2025-04-04 PROBLEM — E66.3 OVERWEIGHT: Status: ACTIVE | Noted: 2017-03-03

## 2025-04-04 PROBLEM — Z98.890 HISTORY OF BILATERAL BREAST REDUCTION SURGERY: Status: RESOLVED | Noted: 2023-06-29 | Resolved: 2025-04-04

## 2025-04-26 ENCOUNTER — MYC MEDICAL ADVICE (OUTPATIENT)
Dept: FAMILY MEDICINE | Facility: OTHER | Age: 35
End: 2025-04-26
Payer: COMMERCIAL

## 2025-04-26 DIAGNOSIS — M79.18 RIGHT BUTTOCK PAIN: ICD-10-CM

## 2025-04-26 DIAGNOSIS — R20.2 PARESTHESIA OF UPPER EXTREMITY: Primary | ICD-10-CM

## 2025-04-26 DIAGNOSIS — S30.0XXA CONTUSION, BUTTOCK, INITIAL ENCOUNTER: ICD-10-CM

## 2025-04-26 DIAGNOSIS — M25.50 ARTHRALGIA, UNSPECIFIED JOINT: ICD-10-CM

## 2025-04-26 DIAGNOSIS — M79.10 MYALGIA: ICD-10-CM

## 2025-04-28 RX ORDER — IBUPROFEN 800 MG/1
800 TABLET, FILM COATED ORAL EVERY 8 HOURS PRN
Qty: 180 TABLET | Refills: 0 | Status: CANCELLED | OUTPATIENT
Start: 2025-04-28

## 2025-04-28 RX ORDER — PREGABALIN 50 MG/1
50 CAPSULE ORAL 2 TIMES DAILY
Qty: 60 CAPSULE | Refills: 1 | Status: SHIPPED | OUTPATIENT
Start: 2025-04-28

## 2025-04-28 NOTE — TELEPHONE ENCOUNTER
Thumbs are acting up. R hand is numbing. L thumb is worst. Does not think Lyrica is helping. Requesting rx for Ibu 800 refilled.     Currently prescribed Lyrica 25 mg BID. (Started 4/4).    Recommend increasing Lyrica to 50 mg BID. Unsure if you would reorder Ibu but Michael'd up.     Routing to provider to review and respond.  Rubén Guerrero RN on 4/28/2025 at 8:44 AM

## 2025-05-06 ENCOUNTER — THERAPY VISIT (OUTPATIENT)
Dept: PHYSICAL THERAPY | Facility: OTHER | Age: 35
End: 2025-05-06
Attending: FAMILY MEDICINE
Payer: COMMERCIAL

## 2025-05-06 DIAGNOSIS — M53.3 SI (SACROILIAC) JOINT DYSFUNCTION: ICD-10-CM

## 2025-05-06 PROCEDURE — 97161 PT EVAL LOW COMPLEX 20 MIN: CPT | Mod: GP

## 2025-05-06 PROCEDURE — 97110 THERAPEUTIC EXERCISES: CPT | Mod: GP

## 2025-05-06 NOTE — PROGRESS NOTES
PHYSICAL THERAPY EVALUATION  Type of Visit: Evaluation       Fall Risk Screen:  Have you fallen 2 or more times in the past year?: Yes  Have you fallen and had an injury in the past year?: No  Is patient receiving Physical Therapy Services?: Yes    Subjective         Presenting condition or subjective complaint: sciatic nerve pain  Date of onset: 01/01/25 (chronic pain for years, current exacerbation since January)    Relevant medical history: Depression; Mental Illness; Smoking     Prior Level of Function  Transfers: Independent  Ambulation: Independent  ADL: Independent    Living Environment  Social support: With a significant other or spouse (+ 16 year old daughter, 13 year old son, 11 year old daughter)   Type of home: House   Stairs to enter the home: Yes 3     Stairs inside the home: Yes (doesn't go into basement)       Employment: Yes     Pain assessment: Pain present  See objective evaluation for additional pain details     Objective   LUMBAR SPINE EVALUATION  PAIN: Pain Level at Rest: 5/10  Pain Level with Use: 10/10  Pain Quality: Aching, Sharp, Shooting, and Stabbing  Pain Frequency: intermittent or daily  INTEGUMENTARY (edema, incisions): WFL  POSTURE: WFL  ROM: lumbar WFL; bilateral hip WFL, pain noted at end range flexion and adduction bilaterally  PELVIC/SI SCREEN: WFL  STRENGTH:   Pain: - none + mild ++ moderate +++ severe  Strength Scale: 0-5/5 Left Right   Hip Flexion 5 5   Hip Extension 4 4   Hip Abduction 3+ 3+   Hip Adduction 4 4   Knee Flexion 5 5   Knee Extension 5 5     Pain: - none + mild ++ moderate +++ severe  Strength Scale: 0-5/5 Left Right   Shoulder Flexion 4+ 4-   Shoulder Extension 4 4   Shoulder Abduction 4+ 4-   Lower Trap 3 3   Rhomboid 4- 4-     MYOTOMES: WNL  NEURAL TENSION:    Left Right   SLR Positive Positive     FLEXIBILITY:   Beighton Hypermobility Scale 5/6/2025   B elbow (2) 2   B knees (2) 1   B thumbs (2) 0   B small finger HE (2) 2   Bend and touch floor  with flat palms (1) 1   Score: (0-9) 6     LUMBAR/HIP Special Tests:    Left Right   ISABEL Negative  Positive   FADIR/Labrum/GOPAL Positive Negative    Ely's Positive Positive      FUNCTIONAL TESTS: SLS: trendelenberg bilateral  PALPATION:   + Tenderness At Location Left Right   Quadratus Lumborum + +   Erector Spinae + +   Piriformis  + +   PSIS - -   Glut Medius - +   Greater Trochanter + +   Ischial Tuberosity - -     SPINAL SEGMENTAL CONCLUSIONS: grossly hypomobile      Assessment & Plan   CLINICAL IMPRESSIONS  Medical Diagnosis: SI (sacroiliac) joint dysfunction    Treatment Diagnosis: chronic multi-joint pain, bilateral hip pain with strength and mobility deficits   Impression/Assessment: Patient is a 35 year old female with sciatic nerve, multi-joint chronic pain complaints.  The following significant findings have been identified: Pain, Decreased ROM/flexibility, Decreased joint mobility, Decreased strength, Decreased proprioception, Impaired muscle performance, Decreased activity tolerance, and Instability. These impairments interfere with their ability to perform self care tasks, work tasks, recreational activities, household chores, household mobility, and community mobility as compared to previous level of function.     Clinical Decision Making (Complexity):  Clinical Presentation: Stable/Uncomplicated  Clinical Presentation Rationale: based on medical and personal factors listed in PT evaluation  Clinical Decision Making (Complexity): Low complexity    PLAN OF CARE  Treatment Interventions:  Modalities: Biofeedback, Cryotherapy, Cupping, Dry Needling, E-stim, Hot Pack, Mechanical Traction, Ultrasound, Vasoneumatic Device  Interventions: Gait Training, Manual Therapy, Neuromuscular Re-education, Therapeutic Activity, Therapeutic Exercise, Aquatic Therapy    Long Term Goals     PT Goal 1  Goal Identifier: HEP  Goal Description: patient will be independent and compliant with HEP for success in PT  Rationale:  to maximize safety and independence with performance of ADLs and functional tasks;to maximize safety and independence within the home;to maximize safety and independence within the community  Target Date: 05/28/25  PT Goal 2  Goal Identifier: pain  Goal Description: patient will report 30% reduction of radiating pain in buttocks with all daily activities, including prolonged sitting  Rationale: to maximize safety and independence with performance of ADLs and functional tasks;to maximize safety and independence within the home;to maximize safety and independence within the community  Target Date: 07/02/25  PT Goal 3  Goal Identifier: work tasks  Goal Description: patient will report ability to complete necessary work tasks including lifting and carrying, navigating stairs, and prolonged standing with <4/10 pain  Rationale: to maximize safety and independence with performance of ADLs and functional tasks;to maximize safety and independence within the home;to maximize safety and independence within the community  Target Date: 07/30/25      Frequency of Treatment: 1-2x/week  Duration of Treatment: 90 days    Education Assessment:   Learner/Method: Patient;Listening;Demonstration;Pictures/Video    Risks and benefits of evaluation/treatment have been explained.   Patient/Family/caregiver agrees with Plan of Care.     Evaluation Time:     PT Eval, Low Complexity Minutes (90576): 20     Signing Clinician: Adrienne Abreu DPT        [facility]                                                                                   OUTPATIENT PHYSICAL THERAPY      PLAN OF TREATMENT FOR OUTPATIENT REHABILITATION   Patient's Last Name, First Name, Ita Mccauley YOB: 1990   Provider's Name   [facility]   Medical Record No.  [id number]     Onset Date: 01/01/25 (chronic pain for years, current exacerbation since January)  Start of Care Date: 05/06/25      Medical Diagnosis:  SI (sacroiliac) joint dysfunction      PT Treatment Diagnosis:  chronic multi-joint pain, bilateral hip pain with strength and mobility deficits Plan of Treatment  Frequency/Duration: 1-2x/week/ 90 days    Certification date from 05/06/25 to 08/03/25         See note for plan of treatment details and functional goals     Adrienne Abreu DPT                         I CERTIFY THE NEED FOR THESE SERVICES FURNISHED UNDER        THIS PLAN OF TREATMENT AND WHILE UNDER MY CARE     (Physician attestation of this document indicates review and certification of the therapy plan).              Referring Provider:  Judy Lu    Initial Assessment  See Epic Evaluation- Start of Care Date: 05/06/25

## 2025-05-12 ASSESSMENT — PATIENT HEALTH QUESTIONNAIRE - PHQ9: SUM OF ALL RESPONSES TO PHQ QUESTIONS 1-9: 0

## 2025-05-13 ENCOUNTER — THERAPY VISIT (OUTPATIENT)
Dept: PHYSICAL THERAPY | Facility: OTHER | Age: 35
End: 2025-05-13
Attending: FAMILY MEDICINE
Payer: COMMERCIAL

## 2025-05-13 DIAGNOSIS — M53.3 SI (SACROILIAC) JOINT DYSFUNCTION: Primary | ICD-10-CM

## 2025-05-13 PROCEDURE — 97110 THERAPEUTIC EXERCISES: CPT | Mod: GP

## 2025-05-13 PROCEDURE — 97530 THERAPEUTIC ACTIVITIES: CPT | Mod: GP

## 2025-05-13 PROCEDURE — 97112 NEUROMUSCULAR REEDUCATION: CPT | Mod: GP

## 2025-05-20 ENCOUNTER — THERAPY VISIT (OUTPATIENT)
Dept: PHYSICAL THERAPY | Facility: OTHER | Age: 35
End: 2025-05-20
Attending: FAMILY MEDICINE
Payer: COMMERCIAL

## 2025-05-20 DIAGNOSIS — M53.3 SI (SACROILIAC) JOINT DYSFUNCTION: Primary | ICD-10-CM

## 2025-05-20 PROCEDURE — 97530 THERAPEUTIC ACTIVITIES: CPT | Mod: GP

## 2025-05-20 PROCEDURE — 97110 THERAPEUTIC EXERCISES: CPT | Mod: GP

## 2025-05-20 PROCEDURE — 97112 NEUROMUSCULAR REEDUCATION: CPT | Mod: GP

## 2025-05-30 ASSESSMENT — ASTHMA QUESTIONNAIRES
QUESTION_4 LAST FOUR WEEKS HOW OFTEN HAVE YOU USED YOUR RESCUE INHALER OR NEBULIZER MEDICATION (SUCH AS ALBUTEROL): THREE OR MORE TIMES PER DAY
QUESTION_3 LAST FOUR WEEKS HOW OFTEN DID YOUR ASTHMA SYMPTOMS (WHEEZING, COUGHING, SHORTNESS OF BREATH, CHEST TIGHTNESS OR PAIN) WAKE YOU UP AT NIGHT OR EARLIER THAN USUAL IN THE MORNING: TWO OR THREE NIGHTS A WEEK
QUESTION_5 LAST FOUR WEEKS HOW WOULD YOU RATE YOUR ASTHMA CONTROL: POORLY CONTROLLED
ACT_TOTALSCORE: 10
QUESTION_2 LAST FOUR WEEKS HOW OFTEN HAVE YOU HAD SHORTNESS OF BREATH: ONCE A DAY
QUESTION_1 LAST FOUR WEEKS HOW MUCH OF THE TIME DID YOUR ASTHMA KEEP YOU FROM GETTING AS MUCH DONE AT WORK, SCHOOL OR AT HOME: SOME OF THE TIME

## 2025-06-04 ENCOUNTER — RESULTS FOLLOW-UP (OUTPATIENT)
Dept: FAMILY MEDICINE | Facility: OTHER | Age: 35
End: 2025-06-04

## 2025-06-04 ENCOUNTER — OFFICE VISIT (OUTPATIENT)
Dept: FAMILY MEDICINE | Facility: OTHER | Age: 35
End: 2025-06-04
Attending: FAMILY MEDICINE
Payer: COMMERCIAL

## 2025-06-04 VITALS
BODY MASS INDEX: 31.64 KG/M2 | SYSTOLIC BLOOD PRESSURE: 136 MMHG | TEMPERATURE: 98.2 F | HEART RATE: 108 BPM | OXYGEN SATURATION: 97 % | WEIGHT: 175.8 LBS | DIASTOLIC BLOOD PRESSURE: 62 MMHG | RESPIRATION RATE: 16 BRPM

## 2025-06-04 DIAGNOSIS — M79.10 MYALGIA: ICD-10-CM

## 2025-06-04 DIAGNOSIS — M25.50 ARTHRALGIA, UNSPECIFIED JOINT: ICD-10-CM

## 2025-06-04 DIAGNOSIS — G56.03 BILATERAL CARPAL TUNNEL SYNDROME: Primary | ICD-10-CM

## 2025-06-04 DIAGNOSIS — R74.8 ELEVATED CREATINE KINASE: ICD-10-CM

## 2025-06-04 LAB
ALBUMIN SERPL BCG-MCNC: 4.3 G/DL (ref 3.5–5.2)
ALP SERPL-CCNC: 55 U/L (ref 40–150)
ALT SERPL W P-5'-P-CCNC: 19 U/L (ref 0–50)
ANION GAP SERPL CALCULATED.3IONS-SCNC: 9 MMOL/L (ref 7–15)
AST SERPL W P-5'-P-CCNC: 25 U/L (ref 0–45)
BILIRUB SERPL-MCNC: 0.2 MG/DL
BUN SERPL-MCNC: 9 MG/DL (ref 6–20)
CALCIUM SERPL-MCNC: 8.7 MG/DL (ref 8.8–10.4)
CHLORIDE SERPL-SCNC: 104 MMOL/L (ref 98–107)
CREAT SERPL-MCNC: 0.83 MG/DL (ref 0.51–0.95)
EGFRCR SERPLBLD CKD-EPI 2021: >90 ML/MIN/1.73M2
EST. AVERAGE GLUCOSE BLD GHB EST-MCNC: 97 MG/DL
GLUCOSE SERPL-MCNC: 120 MG/DL (ref 70–99)
HBA1C MFR BLD: 5 %
HCO3 SERPL-SCNC: 24 MMOL/L (ref 22–29)
POTASSIUM SERPL-SCNC: 3.6 MMOL/L (ref 3.4–5.3)
PROT SERPL-MCNC: 7.1 G/DL (ref 6.4–8.3)
SODIUM SERPL-SCNC: 137 MMOL/L (ref 135–145)
TSH SERPL DL<=0.005 MIU/L-ACNC: 1.75 UIU/ML (ref 0.3–4.2)

## 2025-06-04 PROCEDURE — 84443 ASSAY THYROID STIM HORMONE: CPT | Mod: ZL | Performed by: FAMILY MEDICINE

## 2025-06-04 PROCEDURE — 80053 COMPREHEN METABOLIC PANEL: CPT | Mod: ZL | Performed by: FAMILY MEDICINE

## 2025-06-04 PROCEDURE — G0463 HOSPITAL OUTPT CLINIC VISIT: HCPCS

## 2025-06-04 PROCEDURE — 83036 HEMOGLOBIN GLYCOSYLATED A1C: CPT | Mod: ZL | Performed by: FAMILY MEDICINE

## 2025-06-04 PROCEDURE — 36415 COLL VENOUS BLD VENIPUNCTURE: CPT | Mod: ZL | Performed by: FAMILY MEDICINE

## 2025-06-04 RX ORDER — PREGABALIN 75 MG/1
75 CAPSULE ORAL 2 TIMES DAILY
Qty: 60 CAPSULE | Refills: 1 | Status: SHIPPED | OUTPATIENT
Start: 2025-06-04

## 2025-06-04 ASSESSMENT — PAIN SCALES - GENERAL: PAINLEVEL_OUTOF10: MODERATE PAIN (5)

## 2025-06-04 NOTE — PROGRESS NOTES
"  Assessment & Plan       ICD-10-CM    1. Bilateral carpal tunnel syndrome  G56.03 Orthopedic  Referral     Hemoglobin A1c     TSH Reflex GH     Hemoglobin A1c     TSH Reflex GH      2. Arthralgia, unspecified joint  M25.50 pregabalin (LYRICA) 75 MG capsule      3. Myalgia  M79.10 pregabalin (LYRICA) 75 MG capsule      4. Elevated creatine kinase  R74.8 Comprehensive Metabolic Panel     Comprehensive Metabolic Panel          Would recommend increasing Lyrica to 75 mg twice daily.  Patient would like to start using NSAIDs more consistently.  Had a mildly elevated creatinine last fall, recommend repeat labs today to ensure that would be safe.    Patient would like to proceed with bilateral carpal tunnel surgery, but not until the fall due to work commitments.  She would be interested in trying an injection to see if this gives her some relief over the next couple of months.  Will refer to orthopedic surgery.  Patient is also asking whether Dr. Martinez performs carpal tunnel, will look into this.        BMI  Estimated body mass index is 31.64 kg/m  as calculated from the following:    Height as of 4/4/25: 1.588 m (5' 2.5\").    Weight as of this encounter: 79.7 kg (175 lb 12.8 oz).         Juliana Jean Baptiste is a 35 year old, presenting for the following health issues:  Recheck Medication (Medication management on Lyrica. Did have the nerve testing done too. )        6/4/2025    10:52 AM   Additional Questions   Roomed by Lisa   Accompanied by self     History of Present Illness       Reason for visit:  Med check & more    She eats 0-1 servings of fruits and vegetables daily.She consumes 0 sweetened beverage(s) daily.She exercises with enough effort to increase her heart rate 60 or more minutes per day.  She exercises with enough effort to increase her heart rate 4 days per week.   She is taking medications regularly.        Patient returns for follow-up.  We had previously started Lyrica 25 mg twice daily to " help with myalgias and arthralgias.  Patient has not noticed any improvement.  She has also not noticed any side effects.  A couple of weeks ago her dose was increased to 50 mg twice daily.    She recently had an EMG which does confirm bilateral carpal tunnel syndrome, moderate, and likely will not improve without surgery.    Patient is doing a lot of housecleaning.  She has some commitments over the summer, would like to avoid having surgery to the fall.    She has found NSAIDs helpful in the past.  She had a mildly elevated creatinine last fall.  She is wondering if she can start using NSAIDs as needed.          Objective    /62 (BP Location: Right arm, Patient Position: Sitting, Cuff Size: Adult Regular)   Pulse 108   Temp 98.2  F (36.8  C) (Tympanic)   Resp 16   Wt 79.7 kg (175 lb 12.8 oz)   LMP 06/03/2025 (Exact Date)   SpO2 97%   BMI 31.64 kg/m    Body mass index is 31.64 kg/m .  Physical Exam  Constitutional:       Appearance: She is well-developed.   HENT:      Right Ear: External ear normal.      Left Ear: External ear normal.   Eyes:      General: No scleral icterus.     Conjunctiva/sclera: Conjunctivae normal.   Cardiovascular:      Rate and Rhythm: Normal rate.   Pulmonary:      Effort: Pulmonary effort is normal. No respiratory distress.   Skin:     Findings: No rash.   Neurological:      Mental Status: She is alert.                 Signed Electronically by: Judy Lu MD

## 2025-06-04 NOTE — NURSING NOTE
"Chief Complaint   Patient presents with    Recheck Medication     Medication management on Lyrica. Did have the nerve testing done too.        Initial /62 (BP Location: Right arm, Patient Position: Sitting, Cuff Size: Adult Regular)   Pulse 108   Temp 98.2  F (36.8  C) (Tympanic)   Resp 16   Wt 79.7 kg (175 lb 12.8 oz)   LMP 06/03/2025 (Exact Date)   SpO2 97%   BMI 31.64 kg/m   Estimated body mass index is 31.64 kg/m  as calculated from the following:    Height as of 4/4/25: 1.588 m (5' 2.5\").    Weight as of this encounter: 79.7 kg (175 lb 12.8 oz).  Medication Reconciliation: complete    Lisa Jenkins LPN    Advance Care Directive reviewed    "

## 2025-06-22 ENCOUNTER — MYC MEDICAL ADVICE (OUTPATIENT)
Dept: FAMILY MEDICINE | Facility: OTHER | Age: 35
End: 2025-06-22
Payer: COMMERCIAL

## 2025-06-22 DIAGNOSIS — M79.10 MYALGIA: ICD-10-CM

## 2025-06-22 DIAGNOSIS — M25.50 ARTHRALGIA, UNSPECIFIED JOINT: ICD-10-CM

## 2025-06-22 DIAGNOSIS — J30.9 ALLERGIC RHINITIS, UNSPECIFIED SEASONALITY, UNSPECIFIED TRIGGER: Primary | ICD-10-CM

## 2025-06-23 NOTE — TELEPHONE ENCOUNTER
"Lyrica takes the \"edge off\" but feels like it should be increased to 100 mg.     Currently prescribed 75 mg BID    Per OV from 6/4:  Would recommend increasing Lyrica to 75 mg twice daily.  Patient would like to start using NSAIDs more consistently.  Had a mildly elevated creatinine last fall, recommend repeat labs today to ensure that would be safe.    Michael'd up order for 100 mg BID    Routing to provider to review and respond.  Rubén Guerrero RN on 6/23/2025 at 7:48 AM    "

## 2025-06-30 RX ORDER — PREGABALIN 100 MG/1
100 CAPSULE ORAL 2 TIMES DAILY
Qty: 60 CAPSULE | Refills: 1 | Status: SHIPPED | OUTPATIENT
Start: 2025-06-30

## 2025-06-30 RX ORDER — CETIRIZINE HYDROCHLORIDE 10 MG/1
10 TABLET ORAL DAILY
Qty: 90 TABLET | Refills: 1 | Status: SHIPPED | OUTPATIENT
Start: 2025-06-30

## 2025-06-30 NOTE — TELEPHONE ENCOUNTER
Also wondering about an Rx for an allergy med. OTC meds are not working.     Looks like she has tried Allegra and Benadryl.     Michael'd up order for Zyrtec.     Routing to provider to review and respond.  Rubén Guerrero RN on 6/30/2025 at 7:48 AM

## 2025-07-13 ENCOUNTER — MYC MEDICAL ADVICE (OUTPATIENT)
Dept: FAMILY MEDICINE | Facility: OTHER | Age: 35
End: 2025-07-13
Payer: COMMERCIAL

## 2025-07-14 NOTE — TELEPHONE ENCOUNTER
"Asking if she can come in to do fasting labs.     \"Glucose was high and calcium was low\".      \"I wasn't sure if these results had anything to do with me being tired ALL the time anymore & sore non stop. As well as my migraine I've been getting consistently.\"    My Thoughts:   No need for fasting labs.  Dr. Lu ordered a Hgb A1C with those labs, which gives you a 3 month average of your blood sugars and is not effected by fasting.  That number came back within normal limits.  Calcium levels don't need to be done fasting and yours was just barely under the low limit, so no action needing to be taken at this time.  These results would not be making you tried or sore.      Ashley Langston RN on 7/14/2025 at 3:32 PM    "

## 2025-08-04 ENCOUNTER — OFFICE VISIT (OUTPATIENT)
Dept: ORTHOPEDICS | Facility: OTHER | Age: 35
End: 2025-08-04
Attending: FAMILY MEDICINE
Payer: COMMERCIAL

## 2025-08-04 VITALS
WEIGHT: 175 LBS | SYSTOLIC BLOOD PRESSURE: 138 MMHG | DIASTOLIC BLOOD PRESSURE: 82 MMHG | HEIGHT: 63 IN | RESPIRATION RATE: 16 BRPM | OXYGEN SATURATION: 99 % | BODY MASS INDEX: 31.01 KG/M2 | HEART RATE: 107 BPM

## 2025-08-04 DIAGNOSIS — G56.03 BILATERAL CARPAL TUNNEL SYNDROME: ICD-10-CM

## 2025-08-04 DIAGNOSIS — M65.312 TRIGGER THUMB OF BOTH HANDS: Primary | ICD-10-CM

## 2025-08-04 DIAGNOSIS — M65.311 TRIGGER THUMB OF BOTH HANDS: Primary | ICD-10-CM

## 2025-08-04 PROCEDURE — G0463 HOSPITAL OUTPT CLINIC VISIT: HCPCS

## 2025-08-04 ASSESSMENT — PAIN SCALES - GENERAL: PAINLEVEL_OUTOF10: SEVERE PAIN (10)

## (undated) DEVICE — TOURNIQUET TOURNI-COT FINGER RED MED TCM

## (undated) DEVICE — DRAPE STERI TOWEL LG 1010

## (undated) DEVICE — PREP CHLORAPREP 26ML TINTED GREEN 260825

## (undated) DEVICE — Device

## (undated) DEVICE — GLOVE PROTEXIS POWDER FREE SMT 8.0  2D72PT80X

## (undated) DEVICE — BNDG KLING 4" 2236

## (undated) DEVICE — DRSG STERI STRIP 1/2X4" R1547

## (undated) DEVICE — ADH LIQUID MASTISOL TOPICAL VIAL 2-3ML 0523-48

## (undated) DEVICE — SU MONOCRYL 4-0 P-3 18" UND Y494G

## (undated) DEVICE — DRSG XEROFORM 1X8"

## (undated) DEVICE — PACK MAJOR EXTREMITY SOP15MEFCA

## (undated) DEVICE — BNDG ELASTIC 4"X5YDS UNSTERILE 6611-40

## (undated) DEVICE — ESU GROUND PAD ADULT W/CORD E7507

## (undated) DEVICE — DRSG GAUZE 4X4" 3033

## (undated) DEVICE — GLOVE PROTEXIS BLUE W/NEU-THERA 8.5  2D73EB85

## (undated) DEVICE — SLEEVE COMPRESSION SCD KNEE MED 74022

## (undated) RX ORDER — MEDROXYPROGESTERONE ACETATE 150 MG/ML
INJECTION, SUSPENSION INTRAMUSCULAR
Status: DISPENSED
Start: 2019-12-12

## (undated) RX ORDER — MEDROXYPROGESTERONE ACETATE 150 MG/ML
INJECTION, SUSPENSION INTRAMUSCULAR
Status: DISPENSED
Start: 2019-08-13

## (undated) RX ORDER — MEDROXYPROGESTERONE ACETATE 150 MG/ML
INJECTION, SUSPENSION INTRAMUSCULAR
Status: DISPENSED
Start: 2019-02-26

## (undated) RX ORDER — IPRATROPIUM BROMIDE AND ALBUTEROL SULFATE 2.5; .5 MG/3ML; MG/3ML
SOLUTION RESPIRATORY (INHALATION)
Status: DISPENSED
Start: 2019-05-17

## (undated) RX ORDER — PANTOPRAZOLE SODIUM 40 MG/1
TABLET, DELAYED RELEASE ORAL
Status: DISPENSED
Start: 2018-02-28

## (undated) RX ORDER — ONDANSETRON 2 MG/ML
INJECTION INTRAMUSCULAR; INTRAVENOUS
Status: DISPENSED
Start: 2018-02-28

## (undated) RX ORDER — SCOLOPAMINE TRANSDERMAL SYSTEM 1 MG/1
PATCH, EXTENDED RELEASE TRANSDERMAL
Status: DISPENSED
Start: 2018-02-28

## (undated) RX ORDER — FENTANYL CITRATE 50 UG/ML
INJECTION, SOLUTION INTRAMUSCULAR; INTRAVENOUS
Status: DISPENSED
Start: 2018-02-28

## (undated) RX ORDER — LIDOCAINE HYDROCHLORIDE AND EPINEPHRINE 10; 10 MG/ML; UG/ML
INJECTION, SOLUTION INFILTRATION; PERINEURAL
Status: DISPENSED
Start: 2018-02-28

## (undated) RX ORDER — BUPIVACAINE HYDROCHLORIDE 2.5 MG/ML
INJECTION, SOLUTION EPIDURAL; INFILTRATION; INTRACAUDAL
Status: DISPENSED
Start: 2018-02-28

## (undated) RX ORDER — MEDROXYPROGESTERONE ACETATE 150 MG/ML
INJECTION, SUSPENSION INTRAMUSCULAR
Status: DISPENSED
Start: 2018-11-29

## (undated) RX ORDER — PROPOFOL 10 MG/ML
INJECTION, EMULSION INTRAVENOUS
Status: DISPENSED
Start: 2018-02-28

## (undated) RX ORDER — LIDOCAINE HYDROCHLORIDE 20 MG/ML
INJECTION, SOLUTION EPIDURAL; INFILTRATION; INTRACAUDAL; PERINEURAL
Status: DISPENSED
Start: 2018-02-28

## (undated) RX ORDER — DEXAMETHASONE SODIUM PHOSPHATE 4 MG/ML
INJECTION, SOLUTION INTRA-ARTICULAR; INTRALESIONAL; INTRAMUSCULAR; INTRAVENOUS; SOFT TISSUE
Status: DISPENSED
Start: 2018-02-28

## (undated) RX ORDER — CEFAZOLIN SODIUM 2 G/100ML
INJECTION, SOLUTION INTRAVENOUS
Status: DISPENSED
Start: 2018-02-28